# Patient Record
Sex: FEMALE | Race: WHITE | Employment: FULL TIME | URBAN - METROPOLITAN AREA
[De-identification: names, ages, dates, MRNs, and addresses within clinical notes are randomized per-mention and may not be internally consistent; named-entity substitution may affect disease eponyms.]

---

## 2017-03-20 ENCOUNTER — TELEPHONE (OUTPATIENT)
Dept: INTERNAL MEDICINE CLINIC | Age: 57
End: 2017-03-20

## 2017-04-12 ENCOUNTER — HOSPITAL ENCOUNTER (OUTPATIENT)
Dept: LAB | Age: 57
Discharge: HOME | End: 2017-04-12

## 2017-04-12 PROCEDURE — 99001 SPECIMEN HANDLING PT-LAB: CPT | Performed by: INTERNAL MEDICINE

## 2017-04-15 NOTE — PROGRESS NOTES
61 y.o. WHITE OR  female who presents for f/u    She goes to the [facility] several times a week doing yoga/TRX/pilates plus walking 4 mi 3x/week . No cardiovascular complaints    She's been unable to take 4 statins so far, the last one being crestor    She lost some weight with qsymia after we started it back    Vitals 2016   Weight 182 lb 189 lb 179 lb 6.4 oz  177 lb 9.6 oz     The Sjogren's is being tx'ed w evoxac, Dr. Alexandra Hernandez is currently not following her    She has been under a lot of stress the last several months. Her  grandson needed heart surgery, daughter relapsed back into drug abuse, son got laid off, etc.  This started her having some upper quadrant abd pain. She is s/p choley, remains on ppi tx which has resolved the dyspepsia and reflux. The pain is a sharp pain, no associated nausea/vomiting, she's had anorexia because of it. She had colo neg last year.   No gu or gyn complaints, overdue for exam though and she wants to change providers    Also asking for mammo as he is overdue fopr a while now    Past Medical History:   Diagnosis Date    Allergic rhinitis Dr. Daniel Cardoso      Colon adenoma 2016    Dr Kyler Moe , ,     DJD (degenerative joint disease)     B knees Dr. Mark Sánchez FHx: heart disease     GERD (gastroesophageal reflux disease)     Josseline Beat Mem    Hyperlipidemia     calculated risk score 1.9% (); elected to take statins due to Fhx, elev hscrp/ldl-p but intol of 4 diff ones    Hypertension     Obesity (BMI 30.0-34.9)     peak weight 189 lbs, bmi 31.5 from     Overweight (BMI 25.0-29.9)     saw Dr Ildefonso Finley; qsymia ineffective -6/15    Sjogren's disease, probable Dr. Alexandra Hernandez      Past Surgical History:   Procedure Laterality Date    HX APPENDECTOMY     Bandar Jimenez CHOLECYSTECTOMY  1980s    [address] , Dr. Viktor Moe  adenoma    HX TUBAL LIGATION       Social History     Social History    Marital status: SINGLE     Spouse name: N/A    Number of children: 3    Years of education: N/A     Occupational History     [employer]      Social History Main Topics    Smoking status: Current Every Day Smoker     Packs/day: 0.50    Smokeless tobacco: Never Used    Alcohol use Yes    Drug use: No    Sexual activity: Not on file     Other Topics Concern    Not on file     Social History Narrative     Current Outpatient Prescriptions   Medication Sig    hyoscyamine SL (LEVSIN/SL) 0.125 mg SL tablet 1 Tab by SubLINGual route every four (4) hours as needed for Cramping.  phentermine-topiramate (QSYMIA) 15-92 mg CM24 Take 1 Cap by mouth daily. Max Daily Amount: 1 Cap.  fluticasone (FLONASE) 50 mcg/actuation nasal spray 2 sprays each nostril daily    pantoprazole (PROTONIX) 40 mg tablet take 1 tablet by mouth once daily    estrogen, conjugated,-medroxyPROGESTERone (PREMPRO) 0.45-1.5 mg per tablet take 1 tablet by mouth once daily    cevimeline (EVOXAC) 30 mg capsule take 1 capsule by mouth three times a day    multivitamin (ONE A DAY) tablet Take 1 Tab by mouth daily.  CALCIUM CARBONATE/VITAMIN D3 (CALCIUM WITH VITAMIN D PO) Take  by mouth. No current facility-administered medications for this visit.       Allergies   Allergen Reactions    Benazepril Cough    Chantix [Varenicline] Nausea and Vomiting    Codeine Nausea and Vomiting and Other (comments)     Abdominal Pain    Crestor [Rosuvastatin] Myalgia    Lipitor [Atorvastatin] Myalgia    Pravastatin Myalgia    Prevacid [Lansoprazole] Other (comments)     Constipation    Zocor [Simvastatin] Myalgia     REVIEW OF SYSTEMS:  gyn 7/14 Dr Luc Zimmerman, mammo 8/14, colo 8/16 Dr Jagdeep Walter  no vision change or eye pain  Oral  no mouth pain, tongue or tooth problems  Ears  no hearing loss, ear pain, fullness, no swallowing problems  Cardiac  no CP, PND, orthopnea, edema, palpitations or syncope  Chest  no breast masses  Resp  no wheezing, chronic coughing, dyspnea  Neuro  no focal weakness, numbness, paresthesias, incoordination, ataxia, involuntary movements  Endo - no polyuria, polydipsia, nocturia, hot flashes    Visit Vitals    /78    Pulse 98    Temp 98 °F (36.7 °C) (Oral)    Ht 5' 5\" (1.651 m)    Wt 182 lb (82.6 kg)    SpO2 99%    BMI 30.29 kg/m2     Affect is appropriate. Mood stable  No apparent distress  HEENT --Anicteric sclerae. No thyromegaly, JVD, or bruits. Lungs --Clear to auscultation, normal percussion. Heart --Regular rate and rhythm, no murmurs, rubs, gallops, or clicks. Chest wall --Nontender to palpation. PMI normal.  Abdomen -- Soft and nontender, no hepatosplenomegaly or masses. Extremities -- Without cyanosis, clubbing, edema. 2+ pulses equally and bilaterally.     LABS  From 10/11 showed gluc 92,   cr 0.91, gfr 73, alt 16,         ldl-p 1793, chol 221, tg 99,   hdl 65, ldl-c 136, wbc 6.1, hb 14.5, plt 249, jim neg, vit d 37.1, esr 5  From 2/13 showed   gluc 95,   cr 0.82, gfr 82, alt <5                                   chol 220, tg 117, hdl 53, ldl-c 144  From 4/13 showed   gluc 85,   cr 0.70,            alt 15, hba1c 5.0, ldl-p 1372, chol 163, tg 68,    hdl 60, ldl-c 104, wbc 5.7, hb 13.8, plt 228, ua neg, hscrp 3.8, uric 2.8, tsh 0.86, vit d 36, ua neg  From 6/13 showed   gluc 111, cr 0.70, gfr 99, alt 12,     chol 160, tg 73,   hdl 47, ldl-c 98  From 12/13 showed gluc 92,   cr 0.74, gfr 92, alt 13, hba1c 5.6, ldl-p 1055, chol 158, tg 82,   hdl 54, ldl-c 88  From 6/14 showed          chol 162, tg 97,   hdl 55, ldl-c 88,   wbc 6.2, hb 13.5, plt 228, ua neg  From 6/15 showed   gluc 98,   cr 1.01, gfr 57, alt<5, hba1c 5.5,    chol 177, tg 128, hdl 53, ldl-c 98,   wbc 5.8, hb 14.3, plt 238, ua neg  From 8/16 showed   gluc 102, cr 0.90, gfr 72, alt 13,     chol 241, tg 98,   hdl 68, ldl-c 156, wbc 4.8, hb 14.3, plt 255, ua neg, hep c neg  From 12/16 showed       alt 14,     chol 215, tg 116, hdl 62, ldl-c 130      Results for orders placed or performed in visit on 12/12/16   HEPATIC FUNCTION PANEL   Result Value Ref Range    Protein, total 6.4 6.0 - 8.5 g/dL    Albumin 4.0 3.5 - 5.5 g/dL    Bilirubin, total 0.3 0.0 - 1.2 mg/dL    Bilirubin, direct 0.10 0.00 - 0.40 mg/dL    Alk. phosphatase 69 39 - 117 IU/L    AST (SGOT) 16 0 - 40 IU/L    ALT (SGPT) 12 0 - 32 IU/L   LIPID PANEL   Result Value Ref Range    Cholesterol, total 189 100 - 199 mg/dL    Triglyceride 127 0 - 149 mg/dL    HDL Cholesterol 59 >39 mg/dL    VLDL, calculated 25 5 - 40 mg/dL    LDL, calculated 105 (H) 0 - 99 mg/dL   CVD REPORT   Result Value Ref Range    INTERPRETATION Note      Patient Active Problem List   Diagnosis Code    Hyperlipidemia E78.5    Colon polyps Dr. Sindhu Lemon 2011, adenoma 8/16 K63.5    Sjogren's disease, probable Dr. Harini Mitchell M35.00    Essential hypertension I10    Arthritis, degenerative knees Dr Phi Jara M19.90    Gastroesophageal reflux disease without esophagitis K21.9    Allergic rhinitis J30.9    Obesity (BMI 30.0-34. 9) E66.9     ASSESSMENT AND PLAN:  1. Allergic rhinitis. Continue current regimen. 2.  GERD. Continue ppi adn avoidance measures  3. Colon polyps. F/U 2021, fiber  4. Hyperlipidemia w FH CHD. Off meds for now due to intol  5. Hypertension. Continue current  6. Probable Sjogren's. Continue current and f/u Dr. Harini Mitchell prn  7. Overweight. Doing ok on qsymia  8. Abd pain. Check labs, prn levsin, imaging and gi consult if unrevealing or no improvement  9. Mammogram ordered  10. Stress. Declined meds for now as things are better amotionally  11. Gyn referral to Dr Lenny Castro        RTC 10/17    TOTAL time 40 min spent of which at least 30 min was on counseling, answering questions and coordination of care      Above conditions discussed at length and patient vocalized understanding.   All questions answered to patient satifaction    Lipid lowering therapy not prescibed for patient reasons.

## 2017-04-19 ENCOUNTER — OFFICE VISIT (OUTPATIENT)
Dept: INTERNAL MEDICINE CLINIC | Age: 57
End: 2017-04-19

## 2017-04-19 VITALS
BODY MASS INDEX: 30.32 KG/M2 | TEMPERATURE: 98 F | OXYGEN SATURATION: 99 % | HEART RATE: 98 BPM | HEIGHT: 65 IN | DIASTOLIC BLOOD PRESSURE: 78 MMHG | WEIGHT: 182 LBS | SYSTOLIC BLOOD PRESSURE: 126 MMHG

## 2017-04-19 DIAGNOSIS — R10.10 PAIN OF UPPER ABDOMEN: ICD-10-CM

## 2017-04-19 DIAGNOSIS — E66.9 OBESITY (BMI 30.0-34.9): ICD-10-CM

## 2017-04-19 DIAGNOSIS — R53.83 FATIGUE, UNSPECIFIED TYPE: ICD-10-CM

## 2017-04-19 DIAGNOSIS — R53.83 FATIGUE, UNSPECIFIED TYPE: Primary | ICD-10-CM

## 2017-04-19 DIAGNOSIS — E78.5 HYPERLIPIDEMIA, UNSPECIFIED HYPERLIPIDEMIA TYPE: ICD-10-CM

## 2017-04-19 DIAGNOSIS — Z12.31 SCREENING MAMMOGRAM, ENCOUNTER FOR: ICD-10-CM

## 2017-04-19 DIAGNOSIS — I10 ESSENTIAL HYPERTENSION: ICD-10-CM

## 2017-04-19 DIAGNOSIS — K21.9 GASTROESOPHAGEAL REFLUX DISEASE WITHOUT ESOPHAGITIS: ICD-10-CM

## 2017-04-19 DIAGNOSIS — Z01.419 ROUTINE GYNECOLOGICAL EXAMINATION: ICD-10-CM

## 2017-04-19 RX ORDER — HYOSCYAMINE SULFATE 0.12 MG/1
0.12 TABLET SUBLINGUAL
Qty: 40 TAB | Refills: 2 | Status: SHIPPED | OUTPATIENT
Start: 2017-04-19 | End: 2018-02-22

## 2017-04-19 NOTE — PROGRESS NOTES
1. Have you been to the ER, urgent care clinic or hospitalized since your last visit? NO.     2. Have you seen or consulted any other health care providers outside of the [address] [place] [address] since your last visit (Include any pap smears or colon screening)? NO      Do you have an Advanced Directive? NO    Would you like information on Advanced Directives?  YES

## 2017-04-19 NOTE — MR AVS SNAPSHOT
Visit Information Date & Time Provider Department Dept. Phone Encounter #  
 4/19/2017  8:00 AM Charisma Way MD Internist of [address] [place] [address] [phone] [id number] Your Appointments 10/11/2017  9:55 AM  
LAB with [facility] NURSE VISIT Internist of [facility] ([facility]) Appt Note: labs [address]  
 [address], [address] [facility] [address]  
  
   
 [address], [address]  
  
    
 10/17/2017 10:00 AM  
Office Visit with Charisma Way MD  
Internist of [address] [place] [address]) Appt Note: ov [address]  
 [address], [address] [facility] [address]  
  
   
 [address], [address] Upcoming Health Maintenance Date Due DTaP/Tdap/Td series (1 - Tdap) 2/2/2007 BREAST CANCER SCRN MAMMOGRAM 8/12/2016 PAP AKA CERVICAL CYTOLOGY 7/9/2017 COLONOSCOPY 8/29/2021 Allergies as of 4/19/2017  Review Complete On: 12/12/2016 By: Charisma Way MD  
  
 Severity Noted Reaction Type Reactions Benazepril  10/08/2011    Cough Chantix [Varenicline]    Nausea and Vomiting Codeine    Nausea and Vomiting, Other (comments) Abdominal Pain Lipitor [Atorvastatin]  08/04/2016    Myalgia Pravastatin  12/12/2016    Myalgia Prevacid [Lansoprazole]    Other (comments) Constipation Current Immunizations  Reviewed on 6/27/2014 Name Date Influenza Vaccine (Quad) PF 12/12/2016 Influenza Vaccine PF 12/20/2013 TD Vaccine 2/1/2007 Not reviewed this visit Vitals BP Pulse Temp Height(growth percentile) Weight(growth percentile) SpO2  
 126/78 98 98 °F (36.7 °C) (Oral) 5' 5\" (1.651 m) 182 lb (82.6 kg) 99% BMI OB Status Smoking Status 30.29 kg/m2 Postmenopausal Current Every Day Smoker Vitals History BMI and BSA Data  Body Mass Index Body Surface Area  
 30.29 kg/m 2 1.95 m 2  
  
  
 Preferred Pharmacy Pharmacy Name Phone [facility], [address] [place] [address] Rd [phone] Your Updated Medication List  
  
   
This list is accurate as of: 4/19/17  8:48 AM.  Always use your most recent med list.  
  
  
  
  
 CALCIUM WITH VITAMIN D PO Take  by mouth.  
  
 cevimeline 30 mg capsule Commonly known as:  EVOXAC  
take 1 capsule by mouth three times a day  
  
 estrogen (conjugated)-medroxyPROGESTERone 0.45-1.5 mg per tablet Commonly known as:  PREMPRO  
take 1 tablet by mouth once daily  
  
 fluticasone 50 mcg/actuation nasal spray Commonly known as:  FLONASE  
2 sprays each nostril daily  
  
 multivitamin tablet Commonly known as:  ONE A DAY Take 1 Tab by mouth daily. pantoprazole 40 mg tablet Commonly known as:  PROTONIX  
take 1 tablet by mouth once daily  
  
 phentermine-topiramate 15-92 mg Cm24 Commonly known as:  QSYMIA Take 1 Cap by mouth daily. Max Daily Amount: 1 Cap. Introducing \[facility]\"" & HEALTH SERVICES! Dear Kierra Tay: Thank you for requesting a [employer] account. Our records indicate that you already have an active [employer] account. You can access your account anytime at [email or web address] Did you know that you can access your hospital and ER discharge instructions at any time in [employer]? You can also review all of your test results from your hospital stay or ER visit. Additional Information If you have questions, please visit the Frequently Asked Questions section of the [employer] website at [email or web address] Remember, [employer] is NOT to be used for urgent needs. For medical emergencies, dial 911. Now available from your iPhone and Android! Please provide this summary of care documentation to your next provider. Your primary care clinician is listed as Angel Alan.  If you have any questions after today's visit, please call [phone].

## 2017-04-20 ENCOUNTER — DOCUMENTATION ONLY (OUTPATIENT)
Dept: INTERNAL MEDICINE CLINIC | Age: 57
End: 2017-04-20

## 2017-04-20 LAB
BASOPHILS # BLD AUTO: 0 X10E3/UL (ref 0–0.2)
BASOPHILS NFR BLD AUTO: 1 %
BUN SERPL-MCNC: 16 MG/DL (ref 6–24)
BUN/CREAT SERPL: 17 (ref 9–23)
CALCIUM SERPL-MCNC: 9.7 MG/DL (ref 8.7–10.2)
CHLORIDE SERPL-SCNC: 104 MMOL/L (ref 96–106)
CO2 SERPL-SCNC: 24 MMOL/L (ref 18–29)
CREAT SERPL-MCNC: 0.93 MG/DL (ref 0.57–1)
EOSINOPHIL # BLD AUTO: 0.2 X10E3/UL (ref 0–0.4)
EOSINOPHIL NFR BLD AUTO: 3 %
ERYTHROCYTE [DISTWIDTH] IN BLOOD BY AUTOMATED COUNT: 13 % (ref 12.3–15.4)
GLUCOSE SERPL-MCNC: 102 MG/DL (ref 65–99)
HCT VFR BLD AUTO: 44.1 % (ref 34–46.6)
HGB BLD-MCNC: 14.5 G/DL (ref 11.1–15.9)
IMM GRANULOCYTES # BLD: 0 X10E3/UL (ref 0–0.1)
IMM GRANULOCYTES NFR BLD: 0 %
LIPASE SERPL-CCNC: 34 U/L (ref 0–59)
LYMPHOCYTES # BLD AUTO: 1.3 X10E3/UL (ref 0.7–3.1)
LYMPHOCYTES NFR BLD AUTO: 23 %
MCH RBC QN AUTO: 31.5 PG (ref 26.6–33)
MCHC RBC AUTO-ENTMCNC: 32.9 G/DL (ref 31.5–35.7)
MCV RBC AUTO: 96 FL (ref 79–97)
MONOCYTES # BLD AUTO: 0.4 X10E3/UL (ref 0.1–0.9)
MONOCYTES NFR BLD AUTO: 7 %
NEUTROPHILS # BLD AUTO: 3.7 X10E3/UL (ref 1.4–7)
NEUTROPHILS NFR BLD AUTO: 66 %
PLATELET # BLD AUTO: 291 X10E3/UL (ref 150–379)
POTASSIUM SERPL-SCNC: 4.2 MMOL/L (ref 3.5–5.2)
RBC # BLD AUTO: 4.61 X10E6/UL (ref 3.77–5.28)
SODIUM SERPL-SCNC: 142 MMOL/L (ref 134–144)
T4 FREE SERPL-MCNC: 1.29 NG/DL (ref 0.82–1.77)
TSH SERPL DL<=0.005 MIU/L-ACNC: 1.84 UIU/ML (ref 0.45–4.5)
WBC # BLD AUTO: 5.6 X10E3/UL (ref 3.4–10.8)

## 2017-04-20 NOTE — PROGRESS NOTES
Referral completed for Dr. Joy Archuleta [phone] 4/19/2017-4/18/2018 12 visits    Copy faxed to doctor with records and mailed to pt

## 2017-04-24 ENCOUNTER — HOSPITAL ENCOUNTER (OUTPATIENT)
Dept: MAMMOGRAPHY | Age: 57
Discharge: HOME | End: 2017-04-24
Attending: INTERNAL MEDICINE
Payer: COMMERCIAL

## 2017-04-24 DIAGNOSIS — Z12.31 SCREENING MAMMOGRAM, ENCOUNTER FOR: ICD-10-CM

## 2017-04-24 PROCEDURE — 77067 SCR MAMMO BI INCL CAD: CPT

## 2017-05-25 ENCOUNTER — TELEPHONE (OUTPATIENT)
Dept: INTERNAL MEDICINE CLINIC | Age: 57
End: 2017-05-25

## 2017-05-25 DIAGNOSIS — R10.11 RIGHT UPPER QUADRANT ABDOMINAL PAIN: Primary | ICD-10-CM

## 2017-06-05 ENCOUNTER — TELEPHONE (OUTPATIENT)
Dept: INTERNAL MEDICINE CLINIC | Age: 57
End: 2017-06-05

## 2017-06-05 DIAGNOSIS — R10.9 ABDOMINAL PAIN, UNSPECIFIED LOCATION: Primary | ICD-10-CM

## 2017-06-06 NOTE — TELEPHONE ENCOUNTER
auth # [id number] unlimited visits  06/06/2017 to 10/03/2017  Mailed to pt faxed to [facility]

## 2017-06-11 DIAGNOSIS — E78.5 HYPERLIPIDEMIA, UNSPECIFIED HYPERLIPIDEMIA TYPE: ICD-10-CM

## 2017-06-14 DIAGNOSIS — E78.5 HYPERLIPIDEMIA, UNSPECIFIED HYPERLIPIDEMIA TYPE: ICD-10-CM

## 2017-08-10 ENCOUNTER — TELEPHONE (OUTPATIENT)
Dept: INTERNAL MEDICINE CLINIC | Age: 57
End: 2017-08-10

## 2017-08-10 DIAGNOSIS — M25.562 PAIN IN BOTH KNEES, UNSPECIFIED CHRONICITY: Primary | ICD-10-CM

## 2017-08-10 DIAGNOSIS — M25.561 PAIN IN BOTH KNEES, UNSPECIFIED CHRONICITY: Primary | ICD-10-CM

## 2017-08-10 NOTE — TELEPHONE ENCOUNTER
----- Message from Shelley Lin sent at 8/10/2017 10:22 AM EDT -----  Regarding: Referral Request  Contact: [phone]  Request a referral to Dr. Iker Rodriguez. Every year I get the Euflexxa shots in my knees and it has been a year. My knees are starting to hurt. Thank you!   BTW - I love this office :)

## 2017-08-17 RX ORDER — PANTOPRAZOLE SODIUM 40 MG/1
TABLET, DELAYED RELEASE ORAL
Qty: 90 TAB | Refills: 3 | Status: SHIPPED | OUTPATIENT
Start: 2017-08-17 | End: 2018-02-22

## 2017-08-17 RX ORDER — CEVIMELINE HYDROCHLORIDE 30 MG/1
CAPSULE ORAL
Qty: 270 CAP | Refills: 3 | Status: SHIPPED | OUTPATIENT
Start: 2017-08-17 | End: 2018-02-22

## 2017-08-21 ENCOUNTER — TELEPHONE (OUTPATIENT)
Dept: ORTHOPEDIC SURGERY | Age: 57
End: 2017-08-21

## 2017-08-21 DIAGNOSIS — M17.0 PRIMARY OSTEOARTHRITIS OF BOTH KNEES: Primary | ICD-10-CM

## 2017-08-21 NOTE — TELEPHONE ENCOUNTER
Patient called to request annual euflexxa series for both knees. Patients appointments have been scheduled starting 9/5 and she is aware an order request and auth have to be documented. Please process for patient.

## 2017-09-12 ENCOUNTER — OFFICE VISIT (OUTPATIENT)
Dept: ORTHOPEDIC SURGERY | Age: 57
End: 2017-09-12

## 2017-09-12 VITALS
DIASTOLIC BLOOD PRESSURE: 94 MMHG | BODY MASS INDEX: 28.32 KG/M2 | HEIGHT: 65 IN | WEIGHT: 170 LBS | TEMPERATURE: 99.2 F | HEART RATE: 64 BPM | SYSTOLIC BLOOD PRESSURE: 163 MMHG | OXYGEN SATURATION: 94 %

## 2017-09-12 DIAGNOSIS — M17.0 BILATERAL PRIMARY OSTEOARTHRITIS OF KNEE: Primary | ICD-10-CM

## 2017-09-12 RX ORDER — HYALURONATE SODIUM 10 MG/ML
2 SYRINGE (ML) INTRAARTICULAR ONCE
Qty: 2 ML | Refills: 0
Start: 2017-09-12 | End: 2017-09-12

## 2017-09-12 NOTE — PATIENT INSTRUCTIONS
Arthritis: Care Instructions  Your Care Instructions  Arthritis, also called osteoarthritis, is a breakdown of the cartilage that cushions your joints. When the cartilage wears down, your bones rub against each other. This causes pain and stiffness. Many people have some arthritis as they age. Arthritis most often affects the joints of the spine, hands, hips, knees, or feet. You can take simple measures to protect your joints, ease your pain, and help you stay active. Follow-up care is a key part of your treatment and safety. Be sure to make and go to all appointments, and call your doctor if you are having problems. It's also a good idea to know your test results and keep a list of the medicines you take. How can you care for yourself at home? · Stay at a healthy weight. Being overweight puts extra strain on your joints. · Talk to your doctor or physical therapist about exercises that will help ease joint pain. ¨ Stretch. You may enjoy gentle forms of yoga to help keep your joints and muscles flexible. ¨ Walk instead of jog. Other types of exercise that are less stressful on the joints include riding a bicycle, swimming, jayme chi, or water exercise. ¨ Lift weights. Strong muscles help reduce stress on your joints. Stronger thigh muscles, for example, take some of the stress off of the knees and hips. Learn the right way to lift weights so you do not make joint pain worse. · Take your medicines exactly as prescribed. Call your doctor if you think you are having a problem with your medicine. · Take pain medicines exactly as directed. ¨ If the doctor gave you a prescription medicine for pain, take it as prescribed. ¨ If you are not taking a prescription pain medicine, ask your doctor if you can take an over-the-counter medicine. · Use a cane, crutch, walker, or another device if you need help to get around. These can help rest your joints.  You also can use other things to make life easier, such as a higher toilet seat and padded handles on kitchen utensils. · Do not sit in low chairs, which can make it hard to get up. · Put heat or cold on your sore joints as needed. Use whichever helps you most. You also can take turns with hot and cold packs. ¨ Apply heat 2 or 3 times a day for 20 to 30 minutesusing a heating pad, hot shower, or hot packto relieve pain and stiffness. ¨ Put ice or a cold pack on your sore joint for 10 to 20 minutes at a time. Put a thin cloth between the ice and your skin. When should you call for help? Call your doctor now or seek immediate medical care if:  · You have sudden swelling, warmth, or pain in any joint. · You have joint pain and a fever or rash. · You have such bad pain that you cannot use a joint. Watch closely for changes in your health, and be sure to contact your doctor if:  · You have mild joint symptoms that continue even with more than 6 weeks of care at home. · You have stomach pain or other problems with your medicine. Where can you learn more? Go to http://lance-carlos.info/. Enter V585 in the search box to learn more about \"Arthritis: Care Instructions. \"  Current as of: November 28, 2016  Content Version: 11.3  © 8797-0785 [employer]. Care instructions adapted under license by [employer] (which disclaims liability or warranty for this information). If you have questions about a medical condition or this instruction, always ask your healthcare professional. [place] [address] any warranty or liability for your use of this information.

## 2017-09-12 NOTE — PROGRESS NOTES
Patient: Sammie Espitia                MRN: [id number]       SSN: [id number]  YOB: 1960        AGE: 61 y.o. SEX: female  Body mass index is 28.29 kg/(m^2). PCP: Whitney Cuellar MD  09/12/17    Chief Complaint   Patient presents with    Knee Pain     BILAT       HISTORY:  Sammie Espitia is a 61 y.o. female who is seen for her first Euflexxa injection of the B/L knees. PROCEDURE:  Patient's B/L knees, after timeout under sterile conditions, was injected with 2 cc of Euflexxa. [facility]  OFFICE PROCEDURE PROGRESS NOTE        Chart reviewed for the following:   Melissa Mark MD, have reviewed the History, Physical and updated the Allergic reactions for [address] [place] [address]. performed immediately prior to start of procedure:   Melissa Mark MD, have performed the following reviews on Sammie Espitia prior to the start of the procedure:            * Patient was identified by name and date of birth   * Agreement on procedure being performed was verified  * Risks and Benefits explained to the patient  * Procedure site verified and marked as necessary  * Patient was positioned for comfort  * Consent was signed and verified     Time: 4:03 PM       Date of procedure: 9/12/2017    Procedure performed by:  Joy Clarke M.D. Provider assisted by: None     How tolerated by patient: tolerated the procedure well with no complications    Comments: none    IMPRESSION:     ICD-10-CM ICD-9-CM    1. Bilateral primary osteoarthritis of knee M17.0 715.16 [place] DRAIN/INJECT LARGE JOINT/BURSA      EUFLEXXA INJECTION PER DOSE      sodium hyaluronate (SUPARTZ FX/HYALGAN/GENIVSC) 10 mg/mL syrg injection        PLAN:  Ms. Rebeca Santillan will return in one week for her second Euflexxa injection.       Scribed by Oliver Merino ([address]) as dictated by Joy Clarke MD    I, Dr. Joy Clarke, confirm that all documentation is accurate.     Maggy Neville M.D.   [facility]

## 2017-09-19 ENCOUNTER — OFFICE VISIT (OUTPATIENT)
Dept: ORTHOPEDIC SURGERY | Age: 57
End: 2017-09-19

## 2017-09-19 VITALS
RESPIRATION RATE: 15 BRPM | TEMPERATURE: 98.9 F | HEART RATE: 63 BPM | SYSTOLIC BLOOD PRESSURE: 146 MMHG | OXYGEN SATURATION: 96 % | HEIGHT: 65 IN | DIASTOLIC BLOOD PRESSURE: 85 MMHG

## 2017-09-19 DIAGNOSIS — M17.0 BILATERAL PRIMARY OSTEOARTHRITIS OF KNEE: Primary | ICD-10-CM

## 2017-09-19 RX ORDER — HYALURONATE SODIUM 10 MG/ML
2 SYRINGE (ML) INTRAARTICULAR ONCE
Qty: 2 ML | Refills: 0
Start: 2017-09-19 | End: 2017-09-19

## 2017-09-19 NOTE — PROGRESS NOTES
Patient: Silvia Klein                MRN: [id number]       SSN: [id number]  YOB: 1960        AGE: 61 y.o. SEX: female  There is no height or weight on file to calculate BMI. PCP: Albaro Castro MD  09/19/17    No chief complaint on file. HISTORY:  Silvia Klein is a 61 y.o. female who is seen for reevaluation of Bilateral knee here for 2nd injection of euflexxa. PROCEDURE:  Patient's Bilateral knee, after timeout under sterile conditions, was injected with 2 cc of Euflexxa. [facility]  OFFICE PROCEDURE PROGRESS NOTE        Chart reviewed for the following:   Keith SHULTZ PA-C, have reviewed the History, Physical and updated the Allergic reactions for [address] [place] [address]. performed immediately prior to start of procedure:   Keith SHULTZ PA-C, have performed the following reviews on Silvia Klein prior to the start of the procedure:            * Patient was identified by name and date of birth   * Agreement on procedure being performed was verified  * Risks and Benefits explained to the patient  * Procedure site verified and marked as necessary  * Patient was positioned for comfort  * Consent was signed and verified     Time: 3:57 PM    Date of procedure: 9/19/2017    Procedure performed by:  ALONSO Lobo    Provider assisted by: None     How tolerated by patient: tolerated the procedure well with no complications    Comments: none    IMPRESSION:     ICD-10-CM ICD-9-CM    1. Bilateral primary osteoarthritis of knee M17.0 715.16         PLAN:  Ms. Edith Siegel will return in one week for her third Euflexxa injection.       Keith Saravia PA-C   [facility]

## 2017-09-26 ENCOUNTER — OFFICE VISIT (OUTPATIENT)
Dept: ORTHOPEDIC SURGERY | Age: 57
End: 2017-09-26

## 2017-09-26 VITALS
RESPIRATION RATE: 15 BRPM | HEIGHT: 65 IN | HEART RATE: 84 BPM | SYSTOLIC BLOOD PRESSURE: 131 MMHG | OXYGEN SATURATION: 94 % | DIASTOLIC BLOOD PRESSURE: 94 MMHG | TEMPERATURE: 98.6 F

## 2017-09-26 DIAGNOSIS — M17.0 PRIMARY OSTEOARTHRITIS OF BOTH KNEES: Primary | ICD-10-CM

## 2017-09-26 RX ORDER — HYALURONATE SODIUM 10 MG/ML
2 SYRINGE (ML) INTRAARTICULAR ONCE
Qty: 2 ML | Refills: 0
Start: 2017-09-26 | End: 2017-09-26

## 2017-09-26 NOTE — PROGRESS NOTES
Patient: Leon Valencia                MRN: [id number]       SSN: [id number]  YOB: 1960        AGE: 61 y.o. SEX: female  There is no height or weight on file to calculate BMI. PCP: Zenaida Barcenas MD  09/26/17    Chief Complaint   Patient presents with    Knee Pain     Bilateral       HISTORY:  Leon Valencia is a 61 y.o. female who is seen for her second Euflexxa injections of the B/L knees. PROCEDURE:  Patient's B/L knees, after timeout under sterile conditions, was injected with 2 cc of Euflexxa. [facility]  OFFICE PROCEDURE PROGRESS NOTE        Chart reviewed for the following:   Maribell Thomas MD, have reviewed the History, Physical and updated the Allergic reactions for [address] [place] [address]. performed immediately prior to start of procedure:   Maribell Thomas MD, have performed the following reviews on Leon Valencia prior to the start of the procedure:            * Patient was identified by name and date of birth   * Agreement on procedure being performed was verified  * Risks and Benefits explained to the patient  * Procedure site verified and marked as necessary  * Patient was positioned for comfort  * Consent was signed and verified     Time: 4:12 PM       Date of procedure: 9/26/2017    Procedure performed by:  Shilpi Marino MD    Provider assisted by: None     How tolerated by patient: tolerated the procedure well with no complications    Comments: none    IMPRESSION:     ICD-10-CM ICD-9-CM    1. Primary osteoarthritis of both knees M17.0 715.16 [place] DRAIN/INJECT LARGE JOINT/BURSA      EUFLEXXA INJECTION PER DOSE      sodium hyaluronate (SUPARTZ FX/HYALGAN/GENIVSC) 10 mg/mL syrg injection        PLAN: Ms. Rebeca Mcdermott has completed her Euflexxa injection series. she will return as needed.       Scribed by Sissy Reis ([address]) as dictated by Shilpi Marino MD    I, Dr. Kira Soriano, confirm that all documentation is accurate.     Kira Soriano M.D.   [facility]

## 2017-09-26 NOTE — PATIENT INSTRUCTIONS
Arthritis: Care Instructions  Your Care Instructions  Arthritis, also called osteoarthritis, is a breakdown of the cartilage that cushions your joints. When the cartilage wears down, your bones rub against each other. This causes pain and stiffness. Many people have some arthritis as they age. Arthritis most often affects the joints of the spine, hands, hips, knees, or feet. You can take simple measures to protect your joints, ease your pain, and help you stay active. Follow-up care is a key part of your treatment and safety. Be sure to make and go to all appointments, and call your doctor if you are having problems. It's also a good idea to know your test results and keep a list of the medicines you take. How can you care for yourself at home? · Stay at a healthy weight. Being overweight puts extra strain on your joints. · Talk to your doctor or physical therapist about exercises that will help ease joint pain. ¨ Stretch. You may enjoy gentle forms of yoga to help keep your joints and muscles flexible. ¨ Walk instead of jog. Other types of exercise that are less stressful on the joints include riding a bicycle, swimming, jayme chi, or water exercise. ¨ Lift weights. Strong muscles help reduce stress on your joints. Stronger thigh muscles, for example, take some of the stress off of the knees and hips. Learn the right way to lift weights so you do not make joint pain worse. · Take your medicines exactly as prescribed. Call your doctor if you think you are having a problem with your medicine. · Take pain medicines exactly as directed. ¨ If the doctor gave you a prescription medicine for pain, take it as prescribed. ¨ If you are not taking a prescription pain medicine, ask your doctor if you can take an over-the-counter medicine. · Use a cane, crutch, walker, or another device if you need help to get around. These can help rest your joints.  You also can use other things to make life easier, such as a higher toilet seat and padded handles on kitchen utensils. · Do not sit in low chairs, which can make it hard to get up. · Put heat or cold on your sore joints as needed. Use whichever helps you most. You also can take turns with hot and cold packs. ¨ Apply heat 2 or 3 times a day for 20 to 30 minutes--using a heating pad, hot shower, or hot pack--to relieve pain and stiffness. ¨ Put ice or a cold pack on your sore joint for 10 to 20 minutes at a time. Put a thin cloth between the ice and your skin. When should you call for help? Call your doctor now or seek immediate medical care if:  · You have sudden swelling, warmth, or pain in any joint. · You have joint pain and a fever or rash. · You have such bad pain that you cannot use a joint. Watch closely for changes in your health, and be sure to contact your doctor if:  · You have mild joint symptoms that continue even with more than 6 weeks of care at home. · You have stomach pain or other problems with your medicine. Where can you learn more? Go to http://lance-carlos.info/. Enter K028 in the search box to learn more about \"Arthritis: Care Instructions. \"  Current as of: November 28, 2016  Content Version: 11.3  © 0527-4849 [employer]. Care instructions adapted under license by [employer] (which disclaims liability or warranty for this information). If you have questions about a medical condition or this instruction, always ask your healthcare professional. [place] [address] any warranty or liability for your use of this information.

## 2017-10-17 DIAGNOSIS — E78.5 HYPERLIPIDEMIA, UNSPECIFIED HYPERLIPIDEMIA TYPE: ICD-10-CM

## 2017-11-15 ENCOUNTER — DOCUMENTATION ONLY (OUTPATIENT)
Dept: INTERNAL MEDICINE CLINIC | Age: 57
End: 2017-11-15

## 2017-11-15 NOTE — PROGRESS NOTES
OPMICKO:[id number];ADWTYMT Name:ST: Proton Pump Inhibitors (PPI) - [place] [employer];Status:Approved; Coverage Start Date:11/15/2017;Coverage End Date:11/15/2018;     Faxed a copy to pharmacy

## 2017-11-17 ENCOUNTER — DOCUMENTATION ONLY (OUTPATIENT)
Dept: INTERNAL MEDICINE CLINIC | Age: 57
End: 2017-11-17

## 2018-02-13 ENCOUNTER — HOSPITAL ENCOUNTER (OUTPATIENT)
Dept: PREADMISSION TESTING | Age: 58
Discharge: HOME | End: 2018-02-13
Payer: COMMERCIAL

## 2018-02-13 DIAGNOSIS — M17.11 OSTEOARTHRITIS OF RIGHT KNEE: ICD-10-CM

## 2018-02-13 LAB
ALBUMIN SERPL-MCNC: 3.4 G/DL (ref 3.4–5)
ALBUMIN/GLOB SERPL: 1 {RATIO} (ref 0.8–1.7)
ALP SERPL-CCNC: 101 U/L (ref 45–117)
ALT SERPL-CCNC: 34 U/L (ref 13–56)
ANION GAP SERPL CALC-SCNC: 7 MMOL/L (ref 3–18)
APPEARANCE UR: CLEAR
APTT PPP: 25.4 SEC (ref 23–36.4)
AST SERPL-CCNC: 17 U/L (ref 15–37)
ATRIAL RATE: 67 BPM
BASOPHILS # BLD: 0.1 K/UL (ref 0–0.06)
BASOPHILS NFR BLD: 1 % (ref 0–2)
BILIRUB SERPL-MCNC: 0.2 MG/DL (ref 0.2–1)
BILIRUB UR QL: NEGATIVE
BUN SERPL-MCNC: 18 MG/DL (ref 7–18)
BUN/CREAT SERPL: 21 (ref 12–20)
CALCIUM SERPL-MCNC: 8.9 MG/DL (ref 8.5–10.1)
CALCULATED P AXIS, ECG09: 30 DEGREES
CALCULATED R AXIS, ECG10: 77 DEGREES
CALCULATED T AXIS, ECG11: 66 DEGREES
CHLORIDE SERPL-SCNC: 105 MMOL/L (ref 100–108)
CO2 SERPL-SCNC: 31 MMOL/L (ref 21–32)
COLOR UR: YELLOW
CREAT SERPL-MCNC: 0.86 MG/DL (ref 0.6–1.3)
DIAGNOSIS, 93000: NORMAL
DIFFERENTIAL METHOD BLD: ABNORMAL
EOSINOPHIL # BLD: 0.2 K/UL (ref 0–0.4)
EOSINOPHIL NFR BLD: 3 % (ref 0–5)
ERYTHROCYTE [DISTWIDTH] IN BLOOD BY AUTOMATED COUNT: 13.1 % (ref 11.6–14.5)
ERYTHROCYTE [SEDIMENTATION RATE] IN BLOOD: 24 MM/HR (ref 0–30)
GLOBULIN SER CALC-MCNC: 3.4 G/DL (ref 2–4)
GLUCOSE SERPL-MCNC: 92 MG/DL (ref 74–99)
GLUCOSE UR STRIP.AUTO-MCNC: NEGATIVE MG/DL
HBA1C MFR BLD: 5.3 % (ref 4.5–5.6)
HCT VFR BLD AUTO: 39.1 % (ref 35–45)
HGB BLD-MCNC: 12.7 G/DL (ref 12–16)
HGB UR QL STRIP: NEGATIVE
INR PPP: 0.9 (ref 0.8–1.2)
KETONES UR QL STRIP.AUTO: NEGATIVE MG/DL
LEUKOCYTE ESTERASE UR QL STRIP.AUTO: NEGATIVE
LYMPHOCYTES # BLD: 1.6 K/UL (ref 0.9–3.6)
LYMPHOCYTES NFR BLD: 32 % (ref 21–52)
MCH RBC QN AUTO: 30.6 PG (ref 24–34)
MCHC RBC AUTO-ENTMCNC: 32.5 G/DL (ref 31–37)
MCV RBC AUTO: 94.2 FL (ref 74–97)
MONOCYTES # BLD: 0.4 K/UL (ref 0.05–1.2)
MONOCYTES NFR BLD: 8 % (ref 3–10)
NEUTS SEG # BLD: 2.9 K/UL (ref 1.8–8)
NEUTS SEG NFR BLD: 56 % (ref 40–73)
NITRITE UR QL STRIP.AUTO: NEGATIVE
P-R INTERVAL, ECG05: 130 MS
PH UR STRIP: 5 [PH] (ref 5–8)
PLATELET # BLD AUTO: 261 K/UL (ref 135–420)
PMV BLD AUTO: 10.4 FL (ref 9.2–11.8)
POTASSIUM SERPL-SCNC: 4.2 MMOL/L (ref 3.5–5.5)
PROT SERPL-MCNC: 6.8 G/DL (ref 6.4–8.2)
PROT UR STRIP-MCNC: NEGATIVE MG/DL
PROTHROMBIN TIME: 11.8 SEC (ref 11.5–15.2)
Q-T INTERVAL, ECG07: 378 MS
QRS DURATION, ECG06: 82 MS
QTC CALCULATION (BEZET), ECG08: 399 MS
RBC # BLD AUTO: 4.15 M/UL (ref 4.2–5.3)
SODIUM SERPL-SCNC: 143 MMOL/L (ref 136–145)
SP GR UR REFRACTOMETRY: 1.01 (ref 1–1.03)
UROBILINOGEN UR QL STRIP.AUTO: 0.2 EU/DL (ref 0.2–1)
VENTRICULAR RATE, ECG03: 67 BPM
WBC # BLD AUTO: 5.2 K/UL (ref 4.6–13.2)

## 2018-02-13 PROCEDURE — 87086 URINE CULTURE/COLONY COUNT: CPT | Performed by: ORTHOPAEDIC SURGERY

## 2018-02-13 PROCEDURE — 85610 PROTHROMBIN TIME: CPT | Performed by: ORTHOPAEDIC SURGERY

## 2018-02-13 PROCEDURE — 80053 COMPREHEN METABOLIC PANEL: CPT | Performed by: ORTHOPAEDIC SURGERY

## 2018-02-13 PROCEDURE — 36415 COLL VENOUS BLD VENIPUNCTURE: CPT | Performed by: ORTHOPAEDIC SURGERY

## 2018-02-13 PROCEDURE — 93005 ELECTROCARDIOGRAM TRACING: CPT

## 2018-02-13 PROCEDURE — 81003 URINALYSIS AUTO W/O SCOPE: CPT | Performed by: ORTHOPAEDIC SURGERY

## 2018-02-13 PROCEDURE — 87641 MR-STAPH DNA AMP PROBE: CPT | Performed by: ORTHOPAEDIC SURGERY

## 2018-02-13 PROCEDURE — 85652 RBC SED RATE AUTOMATED: CPT | Performed by: ORTHOPAEDIC SURGERY

## 2018-02-13 PROCEDURE — 85730 THROMBOPLASTIN TIME PARTIAL: CPT | Performed by: ORTHOPAEDIC SURGERY

## 2018-02-13 PROCEDURE — 85025 COMPLETE CBC W/AUTO DIFF WBC: CPT | Performed by: ORTHOPAEDIC SURGERY

## 2018-02-13 PROCEDURE — 83036 HEMOGLOBIN GLYCOSYLATED A1C: CPT | Performed by: ORTHOPAEDIC SURGERY

## 2018-02-14 LAB
BACTERIA SPEC CULT: NORMAL
SERVICE CMNT-IMP: NORMAL

## 2018-02-15 LAB
BACTERIA SPEC CULT: NORMAL
SERVICE CMNT-IMP: NORMAL

## 2018-03-11 ENCOUNTER — ANESTHESIA EVENT (OUTPATIENT)
Dept: SURGERY | Age: 58
DRG: 470 | End: 2018-03-11
Payer: COMMERCIAL

## 2018-03-11 RX ORDER — PREGABALIN 25 MG/1
25 CAPSULE ORAL ONCE
Status: CANCELLED | OUTPATIENT
Start: 2018-03-11 | End: 2018-03-11

## 2018-03-11 RX ORDER — CELECOXIB 100 MG/1
200 CAPSULE ORAL ONCE
Status: CANCELLED | OUTPATIENT
Start: 2018-03-11 | End: 2018-03-11

## 2018-03-11 RX ORDER — ACETAMINOPHEN 10 MG/ML
1000 INJECTION, SOLUTION INTRAVENOUS ONCE
Status: CANCELLED | OUTPATIENT
Start: 2018-03-11 | End: 2018-03-11

## 2018-03-12 ENCOUNTER — ANESTHESIA (OUTPATIENT)
Dept: SURGERY | Age: 58
DRG: 470 | End: 2018-03-12
Payer: COMMERCIAL

## 2018-03-12 ENCOUNTER — HOSPITAL ENCOUNTER (INPATIENT)
Age: 58
LOS: 1 days | Discharge: HOME | DRG: 470 | End: 2018-03-13
Attending: ORTHOPAEDIC SURGERY | Admitting: ORTHOPAEDIC SURGERY
Payer: COMMERCIAL

## 2018-03-12 ENCOUNTER — APPOINTMENT (OUTPATIENT)
Dept: GENERAL RADIOLOGY | Age: 58
DRG: 470 | End: 2018-03-12
Attending: PHYSICIAN ASSISTANT
Payer: COMMERCIAL

## 2018-03-12 DIAGNOSIS — M17.11 PRIMARY OSTEOARTHRITIS OF RIGHT KNEE: Primary | ICD-10-CM

## 2018-03-12 LAB
ABO + RH BLD: NORMAL
BLOOD GROUP ANTIBODIES SERPL: NORMAL
SPECIMEN EXP DATE BLD: NORMAL

## 2018-03-12 PROCEDURE — 76060000035 HC ANESTHESIA 2 TO 2.5 HR: Performed by: ORTHOPAEDIC SURGERY

## 2018-03-12 PROCEDURE — 77030002933 HC SUT MCRYL J&J -A: Performed by: ORTHOPAEDIC SURGERY

## 2018-03-12 PROCEDURE — 77030011264 HC ELECTRD BLD EXT COVD -A: Performed by: ORTHOPAEDIC SURGERY

## 2018-03-12 PROCEDURE — 36415 COLL VENOUS BLD VENIPUNCTURE: CPT | Performed by: ORTHOPAEDIC SURGERY

## 2018-03-12 PROCEDURE — 65270000029 HC RM PRIVATE

## 2018-03-12 PROCEDURE — 77030035643 HC BLD SAW OSC PRECIS STRY -C: Performed by: ORTHOPAEDIC SURGERY

## 2018-03-12 PROCEDURE — 74011000258 HC RX REV CODE- 258: Performed by: PHYSICIAN ASSISTANT

## 2018-03-12 PROCEDURE — 74011250637 HC RX REV CODE- 250/637: Performed by: ANESTHESIOLOGY

## 2018-03-12 PROCEDURE — 77030037875 HC DRSG MEPILEX <16IN BORD MOLN -A: Performed by: ORTHOPAEDIC SURGERY

## 2018-03-12 PROCEDURE — 73560 X-RAY EXAM OF KNEE 1 OR 2: CPT

## 2018-03-12 PROCEDURE — 86900 BLOOD TYPING SEROLOGIC ABO: CPT | Performed by: ORTHOPAEDIC SURGERY

## 2018-03-12 PROCEDURE — 76210000063 HC OR PH I REC FIRST 0.5 HR: Performed by: ORTHOPAEDIC SURGERY

## 2018-03-12 PROCEDURE — 74011250636 HC RX REV CODE- 250/636: Performed by: ANESTHESIOLOGY

## 2018-03-12 PROCEDURE — 74011000250 HC RX REV CODE- 250: Performed by: ORTHOPAEDIC SURGERY

## 2018-03-12 PROCEDURE — 77030027138 HC INCENT SPIROMETER -A: Performed by: ORTHOPAEDIC SURGERY

## 2018-03-12 PROCEDURE — 77030012893

## 2018-03-12 PROCEDURE — 74011250636 HC RX REV CODE- 250/636

## 2018-03-12 PROCEDURE — 77030033067 HC SUT PDO STRATFX SPIR J&J -B: Performed by: ORTHOPAEDIC SURGERY

## 2018-03-12 PROCEDURE — 77030018836 HC SOL IRR NACL ICUM -A: Performed by: ORTHOPAEDIC SURGERY

## 2018-03-12 PROCEDURE — 76010000131 HC OR TIME 2 TO 2.5 HR: Performed by: ORTHOPAEDIC SURGERY

## 2018-03-12 PROCEDURE — 77030011640 HC PAD GRND REM COVD -A: Performed by: ORTHOPAEDIC SURGERY

## 2018-03-12 PROCEDURE — 77010033678 HC OXYGEN DAILY

## 2018-03-12 PROCEDURE — 77030020782 HC GWN BAIR PAWS FLX 3M -B: Performed by: ORTHOPAEDIC SURGERY

## 2018-03-12 PROCEDURE — 64447 NJX AA&/STRD FEMORAL NRV IMG: CPT | Performed by: ANESTHESIOLOGY

## 2018-03-12 PROCEDURE — 77030000032 HC CUF TRNQT ZIMM -B: Performed by: ORTHOPAEDIC SURGERY

## 2018-03-12 PROCEDURE — 74011000250 HC RX REV CODE- 250

## 2018-03-12 PROCEDURE — 74011000258 HC RX REV CODE- 258: Performed by: ORTHOPAEDIC SURGERY

## 2018-03-12 PROCEDURE — 77030036563 HC WRP CLD THER KNE S2SG -B: Performed by: ORTHOPAEDIC SURGERY

## 2018-03-12 PROCEDURE — 77030031139 HC SUT VCRL2 J&J -A: Performed by: ORTHOPAEDIC SURGERY

## 2018-03-12 PROCEDURE — 77030037400 HC ADH TISS HI VISC EXOFIN CHMP -B: Performed by: ORTHOPAEDIC SURGERY

## 2018-03-12 PROCEDURE — 77030003666 HC NDL SPINAL BD -A: Performed by: ORTHOPAEDIC SURGERY

## 2018-03-12 PROCEDURE — 74011250636 HC RX REV CODE- 250/636: Performed by: ORTHOPAEDIC SURGERY

## 2018-03-12 PROCEDURE — 77030013708 HC HNDPC SUC IRR PULS STRY –B: Performed by: ORTHOPAEDIC SURGERY

## 2018-03-12 PROCEDURE — 77030032489 HC SLV COMPR SCD FT CUF COVD -B: Performed by: ORTHOPAEDIC SURGERY

## 2018-03-12 PROCEDURE — C1776 JOINT DEVICE (IMPLANTABLE): HCPCS | Performed by: ORTHOPAEDIC SURGERY

## 2018-03-12 PROCEDURE — 77030016060 HC NDL NRV BLK TELE -A: Performed by: ANESTHESIOLOGY

## 2018-03-12 PROCEDURE — C9290 INJ, BUPIVACAINE LIPOSOME: HCPCS | Performed by: ORTHOPAEDIC SURGERY

## 2018-03-12 PROCEDURE — 77030018846 HC SOL IRR STRL H20 ICUM -A: Performed by: ORTHOPAEDIC SURGERY

## 2018-03-12 PROCEDURE — 97161 PT EVAL LOW COMPLEX 20 MIN: CPT

## 2018-03-12 PROCEDURE — 74011250637 HC RX REV CODE- 250/637: Performed by: PHYSICIAN ASSISTANT

## 2018-03-12 PROCEDURE — 0SRC0J9 REPLACEMENT OF RIGHT KNEE JOINT WITH SYNTHETIC SUBSTITUTE, CEMENTED, OPEN APPROACH: ICD-10-PCS | Performed by: ORTHOPAEDIC SURGERY

## 2018-03-12 PROCEDURE — 74011250636 HC RX REV CODE- 250/636: Performed by: PHYSICIAN ASSISTANT

## 2018-03-12 PROCEDURE — 76942 ECHO GUIDE FOR BIOPSY: CPT | Performed by: ORTHOPAEDIC SURGERY

## 2018-03-12 PROCEDURE — 77030018835 HC SOL IRR LR ICUM -A: Performed by: ORTHOPAEDIC SURGERY

## 2018-03-12 PROCEDURE — 97116 GAIT TRAINING THERAPY: CPT

## 2018-03-12 PROCEDURE — 74011000250 HC RX REV CODE- 250: Performed by: PHYSICIAN ASSISTANT

## 2018-03-12 DEVICE — COMPNT FEM CR TRIATHLN 5 R PA --: Type: IMPLANTABLE DEVICE | Site: KNEE | Status: FUNCTIONAL

## 2018-03-12 DEVICE — COMPONENT PAT DIA35MM THK10MM SUPERIOR/INFERIOR KNEE: Type: IMPLANTABLE DEVICE | Site: KNEE | Status: FUNCTIONAL

## 2018-03-12 DEVICE — COMPONENT TOT KNEE HYBRID POROUS X3 TRIATHLON: Type: IMPLANTABLE DEVICE | Site: KNEE | Status: FUNCTIONAL

## 2018-03-12 DEVICE — INSERT TIB ARTC BRNG SZ5 9MM -- TRIATHLON X3: Type: IMPLANTABLE DEVICE | Site: KNEE | Status: FUNCTIONAL

## 2018-03-12 DEVICE — BASEPLT TIB PC TRITNM SZ 5 -- TRIATHLON: Type: IMPLANTABLE DEVICE | Site: KNEE | Status: FUNCTIONAL

## 2018-03-12 RX ORDER — DEXTROSE 50 % IN WATER (D50W) INTRAVENOUS SYRINGE
25-50 AS NEEDED
Status: DISCONTINUED | OUTPATIENT
Start: 2018-03-12 | End: 2018-03-12 | Stop reason: HOSPADM

## 2018-03-12 RX ORDER — PREGABALIN 50 MG/1
50 CAPSULE ORAL
Status: COMPLETED | OUTPATIENT
Start: 2018-03-12 | End: 2018-03-12

## 2018-03-12 RX ORDER — LANOLIN ALCOHOL/MO/W.PET/CERES
1 CREAM (GRAM) TOPICAL 3 TIMES DAILY
Status: DISCONTINUED | OUTPATIENT
Start: 2018-03-12 | End: 2018-03-13 | Stop reason: HOSPADM

## 2018-03-12 RX ORDER — PANTOPRAZOLE SODIUM 40 MG/1
40 TABLET, DELAYED RELEASE ORAL DAILY
Status: DISCONTINUED | OUTPATIENT
Start: 2018-03-13 | End: 2018-03-13 | Stop reason: HOSPADM

## 2018-03-12 RX ORDER — SODIUM CHLORIDE 0.9 % (FLUSH) 0.9 %
5-10 SYRINGE (ML) INJECTION AS NEEDED
Status: DISCONTINUED | OUTPATIENT
Start: 2018-03-12 | End: 2018-03-12 | Stop reason: HOSPADM

## 2018-03-12 RX ORDER — DEXAMETHASONE SODIUM PHOSPHATE 4 MG/ML
INJECTION, SOLUTION INTRA-ARTICULAR; INTRALESIONAL; INTRAMUSCULAR; INTRAVENOUS; SOFT TISSUE AS NEEDED
Status: DISCONTINUED | OUTPATIENT
Start: 2018-03-12 | End: 2018-03-12 | Stop reason: HOSPADM

## 2018-03-12 RX ORDER — SODIUM CHLORIDE 0.9 % (FLUSH) 0.9 %
5-10 SYRINGE (ML) INJECTION EVERY 8 HOURS
Status: DISCONTINUED | OUTPATIENT
Start: 2018-03-12 | End: 2018-03-13 | Stop reason: HOSPADM

## 2018-03-12 RX ORDER — ACETAMINOPHEN 10 MG/ML
1000 INJECTION, SOLUTION INTRAVENOUS ONCE
Status: COMPLETED | OUTPATIENT
Start: 2018-03-12 | End: 2018-03-12

## 2018-03-12 RX ORDER — FENTANYL CITRATE 50 UG/ML
50 INJECTION, SOLUTION INTRAMUSCULAR; INTRAVENOUS
Status: DISCONTINUED | OUTPATIENT
Start: 2018-03-12 | End: 2018-03-12 | Stop reason: HOSPADM

## 2018-03-12 RX ORDER — DIPHENHYDRAMINE HCL 25 MG
25 CAPSULE ORAL
Status: DISCONTINUED | OUTPATIENT
Start: 2018-03-12 | End: 2018-03-13 | Stop reason: HOSPADM

## 2018-03-12 RX ORDER — CEVIMELINE HYDROCHLORIDE 30 MG/1
30 CAPSULE ORAL 3 TIMES DAILY
Status: DISCONTINUED | OUTPATIENT
Start: 2018-03-12 | End: 2018-03-13 | Stop reason: HOSPADM

## 2018-03-12 RX ORDER — ONDANSETRON 2 MG/ML
4 INJECTION INTRAMUSCULAR; INTRAVENOUS
Status: DISCONTINUED | OUTPATIENT
Start: 2018-03-12 | End: 2018-03-13 | Stop reason: HOSPADM

## 2018-03-12 RX ORDER — SODIUM CHLORIDE, SODIUM LACTATE, POTASSIUM CHLORIDE, CALCIUM CHLORIDE 600; 310; 30; 20 MG/100ML; MG/100ML; MG/100ML; MG/100ML
1000 INJECTION, SOLUTION INTRAVENOUS CONTINUOUS
Status: DISCONTINUED | OUTPATIENT
Start: 2018-03-12 | End: 2018-03-12 | Stop reason: HOSPADM

## 2018-03-12 RX ORDER — MAGNESIUM SULFATE 100 %
4 CRYSTALS MISCELLANEOUS AS NEEDED
Status: DISCONTINUED | OUTPATIENT
Start: 2018-03-12 | End: 2018-03-12 | Stop reason: HOSPADM

## 2018-03-12 RX ORDER — FLUMAZENIL 0.1 MG/ML
0.2 INJECTION INTRAVENOUS
Status: DISCONTINUED | OUTPATIENT
Start: 2018-03-12 | End: 2018-03-12 | Stop reason: HOSPADM

## 2018-03-12 RX ORDER — FENTANYL CITRATE 50 UG/ML
INJECTION, SOLUTION INTRAMUSCULAR; INTRAVENOUS AS NEEDED
Status: DISCONTINUED | OUTPATIENT
Start: 2018-03-12 | End: 2018-03-12 | Stop reason: HOSPADM

## 2018-03-12 RX ORDER — LIDOCAINE HYDROCHLORIDE 20 MG/ML
INJECTION, SOLUTION EPIDURAL; INFILTRATION; INTRACAUDAL; PERINEURAL AS NEEDED
Status: DISCONTINUED | OUTPATIENT
Start: 2018-03-12 | End: 2018-03-12 | Stop reason: HOSPADM

## 2018-03-12 RX ORDER — NALOXONE HYDROCHLORIDE 0.4 MG/ML
0.4 INJECTION, SOLUTION INTRAMUSCULAR; INTRAVENOUS; SUBCUTANEOUS AS NEEDED
Status: DISCONTINUED | OUTPATIENT
Start: 2018-03-12 | End: 2018-03-13 | Stop reason: HOSPADM

## 2018-03-12 RX ORDER — DOCUSATE SODIUM 100 MG/1
100 CAPSULE, LIQUID FILLED ORAL 2 TIMES DAILY
Status: DISCONTINUED | OUTPATIENT
Start: 2018-03-12 | End: 2018-03-13 | Stop reason: HOSPADM

## 2018-03-12 RX ORDER — SODIUM CHLORIDE, SODIUM LACTATE, POTASSIUM CHLORIDE, CALCIUM CHLORIDE 600; 310; 30; 20 MG/100ML; MG/100ML; MG/100ML; MG/100ML
125 INJECTION, SOLUTION INTRAVENOUS CONTINUOUS
Status: DISCONTINUED | OUTPATIENT
Start: 2018-03-12 | End: 2018-03-12 | Stop reason: HOSPADM

## 2018-03-12 RX ORDER — ROPIVACAINE HYDROCHLORIDE 5 MG/ML
INJECTION, SOLUTION EPIDURAL; INFILTRATION; PERINEURAL AS NEEDED
Status: DISCONTINUED | OUTPATIENT
Start: 2018-03-12 | End: 2018-03-12 | Stop reason: HOSPADM

## 2018-03-12 RX ORDER — GLYCOPYRROLATE 0.2 MG/ML
INJECTION INTRAMUSCULAR; INTRAVENOUS AS NEEDED
Status: DISCONTINUED | OUTPATIENT
Start: 2018-03-12 | End: 2018-03-12 | Stop reason: HOSPADM

## 2018-03-12 RX ORDER — LORAZEPAM 2 MG/ML
1 INJECTION INTRAMUSCULAR
Status: DISCONTINUED | OUTPATIENT
Start: 2018-03-12 | End: 2018-03-13 | Stop reason: HOSPADM

## 2018-03-12 RX ORDER — SODIUM CHLORIDE, SODIUM LACTATE, POTASSIUM CHLORIDE, CALCIUM CHLORIDE 600; 310; 30; 20 MG/100ML; MG/100ML; MG/100ML; MG/100ML
125 INJECTION, SOLUTION INTRAVENOUS CONTINUOUS
Status: DISCONTINUED | OUTPATIENT
Start: 2018-03-12 | End: 2018-03-13 | Stop reason: HOSPADM

## 2018-03-12 RX ORDER — CEFAZOLIN SODIUM 2 G/50ML
2 SOLUTION INTRAVENOUS ONCE
Status: COMPLETED | OUTPATIENT
Start: 2018-03-12 | End: 2018-03-12

## 2018-03-12 RX ORDER — INSULIN LISPRO 100 [IU]/ML
INJECTION, SOLUTION INTRAVENOUS; SUBCUTANEOUS ONCE
Status: DISCONTINUED | OUTPATIENT
Start: 2018-03-12 | End: 2018-03-12 | Stop reason: HOSPADM

## 2018-03-12 RX ORDER — PROPOFOL 10 MG/ML
INJECTION, EMULSION INTRAVENOUS AS NEEDED
Status: DISCONTINUED | OUTPATIENT
Start: 2018-03-12 | End: 2018-03-12 | Stop reason: HOSPADM

## 2018-03-12 RX ORDER — ASPIRIN 325 MG
325 TABLET ORAL 2 TIMES DAILY
Status: DISCONTINUED | OUTPATIENT
Start: 2018-03-12 | End: 2018-03-13 | Stop reason: HOSPADM

## 2018-03-12 RX ORDER — ONDANSETRON 2 MG/ML
INJECTION INTRAMUSCULAR; INTRAVENOUS AS NEEDED
Status: DISCONTINUED | OUTPATIENT
Start: 2018-03-12 | End: 2018-03-12 | Stop reason: HOSPADM

## 2018-03-12 RX ORDER — NALOXONE HYDROCHLORIDE 0.4 MG/ML
0.1 INJECTION, SOLUTION INTRAMUSCULAR; INTRAVENOUS; SUBCUTANEOUS AS NEEDED
Status: DISCONTINUED | OUTPATIENT
Start: 2018-03-12 | End: 2018-03-12 | Stop reason: HOSPADM

## 2018-03-12 RX ORDER — EPHEDRINE SULFATE/0.9% NACL/PF 25 MG/5 ML
SYRINGE (ML) INTRAVENOUS AS NEEDED
Status: DISCONTINUED | OUTPATIENT
Start: 2018-03-12 | End: 2018-03-12 | Stop reason: HOSPADM

## 2018-03-12 RX ORDER — SODIUM CHLORIDE, SODIUM LACTATE, POTASSIUM CHLORIDE, CALCIUM CHLORIDE 600; 310; 30; 20 MG/100ML; MG/100ML; MG/100ML; MG/100ML
75 INJECTION, SOLUTION INTRAVENOUS CONTINUOUS
Status: DISCONTINUED | OUTPATIENT
Start: 2018-03-12 | End: 2018-03-13 | Stop reason: HOSPADM

## 2018-03-12 RX ORDER — MIDAZOLAM HYDROCHLORIDE 1 MG/ML
INJECTION, SOLUTION INTRAMUSCULAR; INTRAVENOUS AS NEEDED
Status: DISCONTINUED | OUTPATIENT
Start: 2018-03-12 | End: 2018-03-12 | Stop reason: HOSPADM

## 2018-03-12 RX ORDER — KETOROLAC TROMETHAMINE 30 MG/ML
INJECTION, SOLUTION INTRAMUSCULAR; INTRAVENOUS AS NEEDED
Status: DISCONTINUED | OUTPATIENT
Start: 2018-03-12 | End: 2018-03-12 | Stop reason: HOSPADM

## 2018-03-12 RX ORDER — CEFAZOLIN SODIUM 2 G/50ML
2 SOLUTION INTRAVENOUS EVERY 8 HOURS
Status: COMPLETED | OUTPATIENT
Start: 2018-03-12 | End: 2018-03-13

## 2018-03-12 RX ORDER — CELECOXIB 100 MG/1
200 CAPSULE ORAL
Status: COMPLETED | OUTPATIENT
Start: 2018-03-12 | End: 2018-03-12

## 2018-03-12 RX ORDER — OXYCODONE AND ACETAMINOPHEN 5; 325 MG/1; MG/1
1-2 TABLET ORAL
Status: DISCONTINUED | OUTPATIENT
Start: 2018-03-12 | End: 2018-03-13 | Stop reason: HOSPADM

## 2018-03-12 RX ORDER — HYDROMORPHONE HYDROCHLORIDE 2 MG/ML
1 INJECTION, SOLUTION INTRAMUSCULAR; INTRAVENOUS; SUBCUTANEOUS
Status: DISCONTINUED | OUTPATIENT
Start: 2018-03-12 | End: 2018-03-13 | Stop reason: HOSPADM

## 2018-03-12 RX ORDER — SODIUM CHLORIDE 0.9 % (FLUSH) 0.9 %
5-10 SYRINGE (ML) INJECTION AS NEEDED
Status: DISCONTINUED | OUTPATIENT
Start: 2018-03-12 | End: 2018-03-13 | Stop reason: HOSPADM

## 2018-03-12 RX ADMIN — FENTANYL CITRATE 50 MCG: 50 INJECTION, SOLUTION INTRAMUSCULAR; INTRAVENOUS at 15:18

## 2018-03-12 RX ADMIN — SODIUM CHLORIDE, SODIUM LACTATE, POTASSIUM CHLORIDE, AND CALCIUM CHLORIDE 125 ML/HR: 600; 310; 30; 20 INJECTION, SOLUTION INTRAVENOUS at 15:14

## 2018-03-12 RX ADMIN — PREGABALIN 50 MG: 50 CAPSULE ORAL at 11:55

## 2018-03-12 RX ADMIN — ONDANSETRON 4 MG: 2 INJECTION INTRAMUSCULAR; INTRAVENOUS at 17:20

## 2018-03-12 RX ADMIN — FENTANYL CITRATE 25 MCG: 50 INJECTION, SOLUTION INTRAMUSCULAR; INTRAVENOUS at 14:18

## 2018-03-12 RX ADMIN — FENTANYL CITRATE 25 MCG: 50 INJECTION, SOLUTION INTRAMUSCULAR; INTRAVENOUS at 12:29

## 2018-03-12 RX ADMIN — SODIUM CHLORIDE, SODIUM LACTATE, POTASSIUM CHLORIDE, AND CALCIUM CHLORIDE: 600; 310; 30; 20 INJECTION, SOLUTION INTRAVENOUS at 12:55

## 2018-03-12 RX ADMIN — FENTANYL CITRATE 25 MCG: 50 INJECTION, SOLUTION INTRAMUSCULAR; INTRAVENOUS at 12:56

## 2018-03-12 RX ADMIN — CEFAZOLIN SODIUM 2 G: 2 SOLUTION INTRAVENOUS at 12:23

## 2018-03-12 RX ADMIN — CEFAZOLIN SODIUM 2 G: 2 SOLUTION INTRAVENOUS at 20:00

## 2018-03-12 RX ADMIN — FENTANYL CITRATE 25 MCG: 50 INJECTION, SOLUTION INTRAMUSCULAR; INTRAVENOUS at 12:32

## 2018-03-12 RX ADMIN — FERROUS SULFATE TAB 325 MG (65 MG ELEMENTAL FE) 325 MG: 325 (65 FE) TAB at 20:55

## 2018-03-12 RX ADMIN — DEXAMETHASONE SODIUM PHOSPHATE 4 MG: 4 INJECTION, SOLUTION INTRA-ARTICULAR; INTRALESIONAL; INTRAMUSCULAR; INTRAVENOUS; SOFT TISSUE at 12:35

## 2018-03-12 RX ADMIN — GLYCOPYRROLATE 0.2 MG: 0.2 INJECTION INTRAMUSCULAR; INTRAVENOUS at 12:36

## 2018-03-12 RX ADMIN — MIDAZOLAM HYDROCHLORIDE 2 MG: 1 INJECTION, SOLUTION INTRAMUSCULAR; INTRAVENOUS at 12:06

## 2018-03-12 RX ADMIN — Medication 10 MG: at 13:44

## 2018-03-12 RX ADMIN — OXYCODONE HYDROCHLORIDE AND ACETAMINOPHEN 2 TABLET: 5; 325 TABLET ORAL at 22:13

## 2018-03-12 RX ADMIN — MIDAZOLAM HYDROCHLORIDE 2 MG: 1 INJECTION, SOLUTION INTRAMUSCULAR; INTRAVENOUS at 12:15

## 2018-03-12 RX ADMIN — ROPIVACAINE HYDROCHLORIDE 20 ML: 5 INJECTION, SOLUTION EPIDURAL; INFILTRATION; PERINEURAL at 12:11

## 2018-03-12 RX ADMIN — TRANEXAMIC ACID 1 G: 100 INJECTION, SOLUTION INTRAVENOUS at 12:27

## 2018-03-12 RX ADMIN — FENTANYL CITRATE 25 MCG: 50 INJECTION, SOLUTION INTRAMUSCULAR; INTRAVENOUS at 13:37

## 2018-03-12 RX ADMIN — FERROUS SULFATE TAB 325 MG (65 MG ELEMENTAL FE) 325 MG: 325 (65 FE) TAB at 18:30

## 2018-03-12 RX ADMIN — FENTANYL CITRATE 25 MCG: 50 INJECTION, SOLUTION INTRAMUSCULAR; INTRAVENOUS at 13:10

## 2018-03-12 RX ADMIN — ONDANSETRON 4 MG: 2 INJECTION INTRAMUSCULAR; INTRAVENOUS at 12:36

## 2018-03-12 RX ADMIN — OXYCODONE HYDROCHLORIDE AND ACETAMINOPHEN 2 TABLET: 5; 325 TABLET ORAL at 18:30

## 2018-03-12 RX ADMIN — PROPOFOL 150 MG: 10 INJECTION, EMULSION INTRAVENOUS at 12:22

## 2018-03-12 RX ADMIN — KETOROLAC TROMETHAMINE 30 MG: 30 INJECTION, SOLUTION INTRAMUSCULAR; INTRAVENOUS at 13:45

## 2018-03-12 RX ADMIN — MIDAZOLAM HYDROCHLORIDE 2 MG: 1 INJECTION, SOLUTION INTRAMUSCULAR; INTRAVENOUS at 12:04

## 2018-03-12 RX ADMIN — DOCUSATE SODIUM 100 MG: 100 CAPSULE, LIQUID FILLED ORAL at 20:55

## 2018-03-12 RX ADMIN — FENTANYL CITRATE 25 MCG: 50 INJECTION, SOLUTION INTRAMUSCULAR; INTRAVENOUS at 13:50

## 2018-03-12 RX ADMIN — ASPIRIN 325 MG ORAL TABLET 325 MG: 325 PILL ORAL at 20:55

## 2018-03-12 RX ADMIN — Medication 10 MG: at 12:43

## 2018-03-12 RX ADMIN — LIDOCAINE HYDROCHLORIDE 40 MG: 20 INJECTION, SOLUTION EPIDURAL; INFILTRATION; INTRACAUDAL; PERINEURAL at 12:22

## 2018-03-12 RX ADMIN — FENTANYL CITRATE 25 MCG: 50 INJECTION, SOLUTION INTRAMUSCULAR; INTRAVENOUS at 14:10

## 2018-03-12 RX ADMIN — FENTANYL CITRATE 50 MCG: 50 INJECTION, SOLUTION INTRAMUSCULAR; INTRAVENOUS at 15:13

## 2018-03-12 RX ADMIN — ACETAMINOPHEN 1000 MG: 10 INJECTION, SOLUTION INTRAVENOUS at 12:31

## 2018-03-12 RX ADMIN — SODIUM CHLORIDE, SODIUM LACTATE, POTASSIUM CHLORIDE, AND CALCIUM CHLORIDE 75 ML/HR: 600; 310; 30; 20 INJECTION, SOLUTION INTRAVENOUS at 17:00

## 2018-03-12 RX ADMIN — CELECOXIB 200 MG: 100 CAPSULE ORAL at 11:55

## 2018-03-12 RX ADMIN — SODIUM CHLORIDE, SODIUM LACTATE, POTASSIUM CHLORIDE, AND CALCIUM CHLORIDE 125 ML/HR: 600; 310; 30; 20 INJECTION, SOLUTION INTRAVENOUS at 10:38

## 2018-03-12 NOTE — BRIEF OP NOTE
BRIEF OPERATIVE NOTE    Date of Procedure: 3/12/2018   Preoperative Diagnosis: UNILATERAL PRIMARY OSTEOARTHRITIS, RIGHT KNEE  Postoperative Diagnosis: UNILATERAL PRIMARY OSTEOARTHRITIS, RIGHT KNEE    Procedure(s):  RIGHT TOTAL KNEE REPLACMENT  Surgeon(s) and Role:     * Duy Hernandez MD - Primary         Assistant Staff: Aarti Arredondo PA-C      Surgical Staff:  Circ-1: Yong Rico RN  Scrub Tech-1: Jacqueline Null  Scrub RN-1: Jeremiah Crandall RN  Event Time In   Incision Start 1244   Incision Close 1419     Anesthesia: General   Estimated Blood Loss: 150 mL  Specimens: * No specimens in log *   Findings: Severe DJD   Complications: None  Implants:   Implant Name Type Inv.  Item Serial No.  Lot No. LRB No. Used Action   BASEPLT TIB PC TRITNM SZ 5 -- TRIATHLON - [id number]  BASEPLT TIB PC TRITNM SZ 5 -- TRIATHLON  [employer] [facility] [id number] Right 1 Implanted   INSERT TIB ARTC BRNG SZ5 9MM -- TRIATHLON X3 - [id number]  INSERT TIB ARTC BRNG SZ5 9MM -- TRIATHLON X3  [employer] [facility] [id number] Right 1 Implanted   COMPNT FEM CR TRIATHLN 5 R PA --  - [id number]  COMPNT FEM CR TRIATHLN 5 R PA --   [employer] HOW [id number] Right 1 Implanted   PAT ASYM MTL-BK 10MM SZ A35 -- TRIATHLON - [id number]   PAT ASYM MTL-BK 10MM SZ A35 -- TRIATHLON   [employer] HOW [id number] Right 1 Implanted

## 2018-03-12 NOTE — PERIOP NOTES
TRANSFER - OUT REPORT:    Verbal report given to Giovana Gold RN (name) on [employer]  being transferred to  (unit) for routine progression of care       Report consisted of patients Situation, Background, Assessment and   Recommendations(SBAR). Information from the following report(s) SBAR, Kardex, OR Summary, Procedure Summary, Intake/Output, MAR and Recent Results was reviewed with the receiving nurse. Lines:   Peripheral IV 03/12/18 Left Hand (Active)   Site Assessment Clean, dry, & intact 3/12/2018  2:30 PM   Phlebitis Assessment 0 3/12/2018  2:30 PM   Infiltration Assessment 0 3/12/2018  2:30 PM   Dressing Status Clean, dry, & intact 3/12/2018  2:30 PM   Dressing Type Transparent 3/12/2018  2:30 PM   Hub Color/Line Status Green; Infusing 3/12/2018  2:30 PM        Opportunity for questions and clarification was provided.       Patient transported with:   O2 @ 2 liters  Registered Nurse  [facility]

## 2018-03-12 NOTE — OP NOTES
[address] [address],[address]   Right Total Knee Arthroplasty          Date of Surgery: 3/12/2018   Preoperative Diagnosis: UNILATERAL PRIMARY OSTEOARTHRITIS, RIGHT KNEE   Postoperative Diagnosis: UNILATERAL PRIMARY OSTEOARTHRITIS, RIGHT KNEE   Location: [facility]  Surgeon: Leola Martinez MD  Assistant:  Lu ASHLEY  Anesthesia: General and Adductor Canal Block    Procedure: Right Total Knee Arthroplasty    Findings:  Degenerative joint disease of the right knee. Estimated Blood Loss:  150 cc    Specimens: None    Implants:   Implant Name Type Inv. Item Serial No.  Lot No. LRB No. Used Action   BASEPLT TIB PC TRITNM SZ 5 -- TRIATHLON - [id number]  BASEPLT TIB PC TRITNM SZ 5 -- TRIATHLON  [employer] HOW [id number] Right 1 Implanted   INSERT TIB ARTC BRNG SZ5 9MM -- TRIATHLON X3 - [id number]  INSERT TIB ARTC BRNG SZ5 9MM -- TRIATHLON X3  [employer] HOW [id number] Right 1 Implanted   COMPNT FEM CR TRIATHLN 5 R PA --  - [id number]  COMPNT FEM CR TRIATHLN 5 R PA --   [employer] HOW [id number] Right 1 Implanted   PAT ASYM MTL-BK 10MM SZ A35 -- TRIATHLON - [id number]   PAT ASYM MTL-BK 10MM SZ A35 -- TRIATHLON   [employer] HOW [id number] Right 1 Implanted       OPERATIVE PROCEDURE IN DETAIL: The patient was taken to the operating room, placed in supine position on the operating table. After satisfactory general anesthesia was established, tourniquet was placed on the right thigh. The right leg was prepped with ChloraPrep and draped in a sterile fashion. A time-out was accomplished. Esmarch bandage was used to exsanguinate the leg. Tourniquet was inflated to 280 mmHg. Anterior midline incision was made, length of approximately 4-1/2 inches. Incision was made through the skin and subcutaneous tissue. Medial parapatellar incision was made. The patella was everted and held with towel clips. The thickness was measured at 24 mm.   The preliminary cut was made using the oscillating saw. The final preparation was not done at this time. Attention was directed to the femur. Osteophytes were removed as they were encountered. Drill hole was made in the depth of the intercondylar notch. This was followed by the intramedullary whitney with the distal cut guide. The guide was held in place with 3 pins. Remaining jigs were removed and the distal femur was cut at this time. The femur was measured and noted to be the size 5. The multi cut femoral block was placed on the distal femur, held in place with 2 pegs and 2 pins. The , anterior, posterior, posterior chamfer, and anterior chamfer cuts were made at this time. The remaining pins and jig were removed at this time. Bone was removed using an osteotome. Curved osteotome was placed on the back of the distal femur to release any osteophytes and contractures at this time. Attention was directed to the tibia. Any remaining meniscal components were removed. The posterior and lateral retractors were placed. Care being taken to avoid excess pressure on the lateral retractor. The extramedullary tibial guide system was used. It was held in position and adjusted for alignment. The cutting guide was measured at approximately 9 mm off the high side. The cutting block was pinned in place. The remaining jigs were removed. The alignment whitney was placed on the tibial cutting block to help with alignment. The proximal tibia was cut at this time. The bone was removed. The spacer block was used and was satisfactory in flexion and extension. The tibia was sized and noted to be the size indicated above. The tibial baseplate with was placed at this time. The tibial baseplate was fixed with two pins. The tibial trial poly was placed on the baseplate. The femoral component was impacted into position. The knee was placed through a range of motion which was noted to be satisfactory in flexion and extension.   Good stability was noted in both flexion and extension. Attention was directed back to the patella. The patella was again everted a maximum of 10 mm of patella was removed from the initial measurement with the measurement now being 14 mm. The patella was sized and noted to be the size indicated above. The lug holes were drilled at this time. The knee was placed in flexion. The femoral lug holes were drilled. The guide for the tibial finned punch was placed and the finned punch was use at this time. The finned punch was removed and the guide for the tibial pegs was placed. All 4 peg holes were made. Any sclerotic areas were multiply drilled. Pulse lavage irrigation was used at this time. The tibial base plate was impacted into position. The polyethylene component was placed and impacted into position. The femoral component was impacted into position. The knee was placed in extension. The patella was then placed and compressed with a clamp. The knee was checked in flexion and extension for stability in varus and valgus stress. The patella tracked well without the need for lateral release. The tourniquet was deflated. Exparel was placed in the wound edges and the periosteum. The parapatellar incision was closed using interrupted #1 Vicryl figure-of-eight sutures followed by the Stratafix. The knee was injected with 4 mg MS, 30 mg toradol and 30cc of .5% marcaine with epi - the knee was water tight. Subcutaneous tissue was closed using 2-0 Monocryl and the skin was closed with a subcuticular 3-0 Monocryl. The Prineo system was used, followed by a Mepilex dressing. The patient tolerated the procedure well, was awakened from anesthesia, transferred onto the recovery room bed and taken to recovery room in stable condition.      Desi Costa MD 3/12/2018 6:34 PM

## 2018-03-12 NOTE — ANESTHESIA PREPROCEDURE EVALUATION
Anesthetic History   No history of anesthetic complications            Review of Systems / Medical History  Patient summary reviewed, nursing notes reviewed and pertinent labs reviewed    Pulmonary  Within defined limits                 Neuro/Psych   Within defined limits           Cardiovascular    Hypertension              Exercise tolerance: >4 METS     GI/Hepatic/Renal     GERD: well controlled           Endo/Other        Morbid obesity and arthritis     Other Findings              Physical Exam    Airway  Mallampati: III  TM Distance: 4 - 6 cm  Neck ROM: normal range of motion   Mouth opening: Normal     Cardiovascular  Regular rate and rhythm,  S1 and S2 normal,  no murmur, click, rub, or gallop  Rhythm: regular  Rate: normal         Dental  No notable dental hx       Pulmonary  Breath sounds clear to auscultation               Abdominal  GI exam deferred       Other Findings            Anesthetic Plan    ASA: 2  Anesthesia type: general      Post-op pain plan if not by surgeon: peripheral nerve block single      Anesthetic plan and risks discussed with: Patient

## 2018-03-12 NOTE — OP NOTES
[address] [address]   Right Total Knee Arthroplasty          Date of Surgery: 3/12/2018   Preoperative Diagnosis: UNILATERAL PRIMARY OSTEOARTHRITIS, RIGHT KNEE   Postoperative Diagnosis: UNILATERAL PRIMARY OSTEOARTHRITIS, RIGHT KNEE   Location: [facility]  Surgeon: Ban Hoffman MD  Assistant:  Allan ASHLEY  Anesthesia: General and Adductor Canal Block    Procedure: Right Total Knee Arthroplasty    Findings:  Degenerative joint disease of the right knee. Estimated Blood Loss:  150 cc    Specimens: None    Implants:   Implant Name Type Inv. Item Serial No.  Lot No. LRB No. Used Action   BASEPLT TIB PC TRITNM SZ 5 -- TRIATHLON - [id number]  BASEPLT TIB PC TRITNM SZ 5 -- TRIATHLON  [employer] HOW [id number] Right 1 Implanted   INSERT TIB ARTC BRNG SZ5 9MM -- TRIATHLON X3 - [id number]  INSERT TIB ARTC BRNG SZ5 9MM -- TRIATHLON X3  [employer] HOW [id number] Right 1 Implanted   COMPNT FEM CR TRIATHLN 5 R PA --  - [id number]  COMPNT FEM CR TRIATHLN 5 R PA --   [employer] HOWM [id number] Right 1 Implanted   PAT ASYM MTL-BK 10MM SZ A35 -- TRIATHLON - [id number]   PAT ASYM MTL-BK 10MM SZ A35 -- TRIATHLON   [employer] HOWM [id number] Right 1 Implanted       OPERATIVE PROCEDURE IN DETAIL: The patient was taken to the operating room, placed in supine position on the operating table. After satisfactory general anesthesia was established, tourniquet was placed on the right thigh. The right leg was prepped with ChloraPrep and draped in a sterile fashion. A time-out was accomplished. Esmarch bandage was used to exsanguinate the leg. Tourniquet was inflated to 280 mmHg. Anterior midline incision was made, length of approximately 4-1/2 inches. Incision was made through the skin and subcutaneous tissue. Medial parapatellar incision was made. The patella was everted and held with towel clips. The thickness was measured at 24 mm.   The preliminary cut was made using the oscillating saw. The final preparation was not done at this time. Attention was directed to the femur. Osteophytes were removed as they were encountered. Drill hole was made in the depth of the intercondylar notch. This was followed by the intramedullary whitney with the distal cut guide. The guide was held in place with 3 pins. Remaining jigs were removed and the distal femur was cut at this time. The femur was measured and noted to be the size 5. The multi cut femoral block was placed on the distal femur, held in place with 2 pegs and 2 pins. The , anterior, posterior, posterior chamfer, and anterior chamfer cuts were made at this time. The remaining pins and jig were removed at this time. Bone was removed using an osteotome. Curved osteotome was placed on the back of the distal femur to release any osteophytes and contractures at this time. Attention was directed to the tibia. Any remaining meniscal components were removed. The posterior and lateral retractors were placed. Care being taken to avoid excess pressure on the lateral retractor. The extramedullary tibial guide system was used. It was held in position and adjusted for alignment. The cutting guide was measured at approximately 9 mm off the high side. The cutting block was pinned in place. The remaining jigs were removed. The alignment whitney was placed on the tibial cutting block to help with alignment. The proximal tibia was cut at this time. The bone was removed. The spacer block was used and was satisfactory in flexion and extension. The tibia was sized and noted to be the size indicated above. The tibial baseplate with was placed at this time. The tibial baseplate was fixed with two pins. The tibial trial poly was placed on the baseplate. The femoral component was impacted into position. The knee was placed through a range of motion which was noted to be satisfactory in flexion and extension.   Good stability was noted in both flexion and extension. Attention was directed back to the patella. The patella was again everted a maximum of 10 mm of patella was removed from the initial measurement with the measurement now being 14 mm. The patella was sized and noted to be the size indicated above. The lug holes were drilled at this time. The knee was placed in flexion. The femoral lug holes were drilled. The guide for the tibial finned punch was placed and the finned punch was use at this time. The finned punch was removed and the guide for the tibial pegs was placed. All 4 peg holes were made. Any sclerotic areas were multiply drilled. Pulse lavage irrigation was used at this time. The tibial base plate was impacted into position. The polyethylene component was placed and impacted into position. The femoral component was impacted into position. The knee was placed in extension. The patella was then placed and compressed with a clamp. The knee was checked in flexion and extension for stability in varus and valgus stress. The patella tracked well without the need for lateral release. The tourniquet was deflated. Exparel was placed in the wound edges and the periosteum. The parapatellar incision was closed using interrupted #1 Vicryl figure-of-eight sutures followed by the Stratafix. The knee was injected with 4 mg MS, 30 mg toradol and 30cc of .5% marcaine with epi - the knee was water tight. Subcutaneous tissue was closed using 2-0 Monocryl and the skin was closed with a subcuticular 3-0 Monocryl. The Prineo system was used, followed by a Mepilex dressing. The patient tolerated the procedure well, was awakened from anesthesia, transferred onto the recovery room bed and taken to recovery room in stable condition.      Dara Bueno MD 3/12/2018 6:41 PM

## 2018-03-12 NOTE — ANESTHESIA POSTPROCEDURE EVALUATION
Post-Anesthesia Evaluation and Assessment    Cardiovascular Function/Vital Signs  Visit Vitals    /75    Pulse 85    Temp 37.6 °C (99.7 °F)    Resp 17    Ht 5' 5\" (1.651 m)    Wt 85.4 kg (188 lb 3 oz)    SpO2 97%    BMI 31.32 kg/m2       Patient is status post Procedure(s):  RIGHT TOTAL KNEE REPLACMENT. Nausea/Vomiting: Controlled. Postoperative hydration reviewed and adequate. Pain:  Pain Scale 1: Numeric (0 - 10) (03/12/18 1445)  Pain Intensity 1: 0 (03/12/18 1445)   Managed. Neurological Status:   Neuro (WDL): (P) Within Defined Limits (03/12/18 1445)   At baseline. Mental Status and Level of Consciousness: Baseline and stable. Pulmonary Status:   O2 Device: Nasal cannula (03/12/18 1435)   Adequate oxygenation and airway patent. Complications related to anesthesia: None    Post-anesthesia assessment completed. No concerns. Patient has met all discharge requirements.     Signed By: Trina Roach MD

## 2018-03-12 NOTE — IP AVS SNAPSHOT
[place] 
 
 
 [address] [facility] [address] 
[phone] Patient: Ramirez Lopez MRN: [id number] BZM:[birth year] A check jeb indicates which time of day the medication should be taken. My Medications START taking these medications Instructions Each Dose to Equal  
 Morning Noon Evening Bedtime  
 aspirin 325 mg tablet Commonly known as:  ASPIRIN Your last dose was: Your next dose is: Take 1 Tab by mouth two (2) times a day. 325 mg  
    
   
   
   
  
 oxyCODONE-acetaminophen 5-325 mg per tablet Commonly known as:  PERCOCET Your last dose was: Your next dose is: Take 1-2 Tabs by mouth every four (4) hours as needed. Max Daily Amount: 12 Tabs. 1-2 Tab CHANGE how you take these medications Instructions Each Dose to Equal  
 Morning Noon Evening Bedtime  
 cevimeline 30 mg capsule Commonly known as:  [facility] What changed:   
- how much to take - when to take this 
- additional instructions Your last dose was: Your next dose is:    
   
   
 take 1 capsule by mouth three times a day  
     
   
   
   
  
 pantoprazole 40 mg tablet Commonly known as:  PROTONIX What changed:   
- when to take this 
- additional instructions Your last dose was: Your next dose is:    
   
   
 take 1 tablet by mouth once daily STOP taking these medications CALCIUM WITH VITAMIN D PO  
   
  
 multivitamin tablet Commonly known as:  ONE A DAY TYLENOL ARTHRITIS PAIN 650 mg Arvid [address]ane Generic drug:  acetaminophen TYLENOL PM PO Where to Get Your Medications Information on where to get these meds will be given to you by the nurse or doctor. ! Ask your nurse or doctor about these medications  
  aspirin 325 mg tablet  
 oxyCODONE-acetaminophen 5-325 mg per tablet

## 2018-03-12 NOTE — IP AVS SNAPSHOT
[facility] 
 
 
 [address] [facility] [address] 
[phone] Patient: Sudeep Peña MRN: [id number] JOANIE:[id number] About your hospitalization You were admitted on:  March 12, 2018 You last received care in the:  THE [facility] [address] You were discharged on:  March 13, 2018 Why you were hospitalized Your primary diagnosis was:  Not on File Your diagnoses also included:  Osteoarthritis Of Right Knee Follow-up Information Follow up With Details Comments Contact Info Wendi Triana MD On 3/28/2018 Follow up appointment @ 11:20am [address] [facility] [address] [facility] 
[phone] Zehra Pompa MD   [address] [facility] [address] [facility] 
[phone] Discharge Orders None A check jeb indicates which time of day the medication should be taken. My Medications START taking these medications Instructions Each Dose to Equal  
 Morning Noon Evening Bedtime  
 aspirin 325 mg tablet Commonly known as:  ASPIRIN Your last dose was: Your next dose is: Take 1 Tab by mouth two (2) times a day. 325 mg  
    
   
   
   
  
 oxyCODONE-acetaminophen 5-325 mg per tablet Commonly known as:  PERCOCET Your last dose was: Your next dose is: Take 1-2 Tabs by mouth every four (4) hours as needed. Max Daily Amount: 12 Tabs. 1-2 Tab CHANGE how you take these medications Instructions Each Dose to Equal  
 Morning Noon Evening Bedtime  
 cevimeline 30 mg capsule Commonly known as:  [facility] What changed:   
- how much to take - when to take this 
- additional instructions Your last dose was: Your next dose is:    
   
   
 take 1 capsule by mouth three times a day  
     
   
   
   
  
 pantoprazole 40 mg tablet Commonly known as:  PROTONIX What changed: - when to take this 
- additional instructions Your last dose was: Your next dose is:    
   
   
 take 1 tablet by mouth once daily STOP taking these medications CALCIUM WITH VITAMIN D PO  
   
  
 multivitamin tablet Commonly known as:  ONE A DAY TYLENOL ARTHRITIS PAIN 650 mg Radha Barajas Generic drug:  acetaminophen TYLENOL PM PO Where to Get Your Medications Information on where to get these meds will be given to you by the nurse or doctor. ! Ask your nurse or doctor about these medications  
  aspirin 325 mg tablet  
 oxyCODONE-acetaminophen 5-325 mg per tablet Discharge Instructions Total Knee Arthroplasty Discharge Instructions Dr. Bonna Habermann Please take the time to review the following instructions before you leave the hospital and use them as guidelines during your recovery from surgery. If you have any questions you may contact my office at [phone]. Wound Care/Dressing Changes: You may change your dressing as needed. Beginning the 2 days after you are discharged from the hospital you should change your dressing daily. A big, bulky dressing isn't necessary as long as there isn't any drainage from the incisions. You can put a band-aid or Mepilex dressing over the incision and wear JEFFERY hose as needed for comfort and swelling. No dressing is necessary if there is no drainage. It isn't necessary to apply antibiotic ointment to your incisions. Prineo tape will peel off in approximately 2-6 weeks. It does not need to be removed prior to that. When it begins to peel off you can cut the edges away with scissors. Showering/Bathing: 
 
[x]   You may shower 2 days after surgery. Your dressing may be removed for showering. You may get your incisions wet in the shower. Don't vigorously scrub the area where your incisions are.   Apply a clean, dry dressing after drying off the area of your incisions. Don't take a tub bath, get in a swimming pool or Jacuzzi until the incisions are completely healed, which is about 14 days. Do not soak your incisions under water. Weight Bearing Status/Activity: 
       
You may walk as tolerated and perform your normal daily activities. Use a walker or a  
cane only if you need them. You should strive to achieve full range of motion in  
your knee as tolerated. We would like for you to return to your normal activities as soon  
as possible. Ice/Elevation: 
 
Continue ice and elevation as needed for pain and swelling. Diet: 
 
Resume your prehospital diet. If you have excessive nausea or vomitting call your doctor's office. Medications: 
 
 
 
1. You will be given a prescription for pain medications when you are discharged from the hospital.  Take the medication as needed according to the directions on the prescription bottle. Possible side effects of the medication include dizziness, headache, nausea, vomiting, constipation and urinary retention. If you experience any of these side effects call the office so that we can assist you in relieving them. Discontinue the use of the pain medication if you develop itching, rash, shortness of breath or difficulties swallowing. If these symptoms become severe or aren't relieved by discontinuing the medication you should seek immediate medical attention. Refills of pain medication are authorized during office hours only. (8AM - 5PM Mon thru Fri) 2. If you were prescribed Percocet/oxycodone or Dilaudid/hydromorphone you must have a written prescription. These medications legally CANNOT be called in to a pharmacy. 3. Do not take Tylenol in addition to your pain medication as most of the pain medication already contains Tylenol. Do not exceed 4000 mg of Tylenol per day. Ex:  (hydrocodon 5/500mg = 500 mg of Tylenol) 4. You may resume the medication you were taking prior to your surgery. Pain medication may change the effects of any antidepressant medication. If you have any questions about possible interactions between your regular medications and the pain medication you should consult the physician who prescribes your regular medications. Stool Softeners:   
Pain medications can cause constipation. Stool softeners, warm prune juice and increasing your water and fiber intake can help prevent constipation. Do not take laxatives. Blood Thinner: You will be prescribed aspirin 325mg to take twice daily for one month following surgery to prevent blood clots. Home Health: 
Begin In-Home Physical Therapy; 3 times a week to work on gait training, range of motion, strengthening, and weight bearing exercises as tolerable. Home health has been arranged for skilled nursing visits and physical therapy. If no one from the agency calls you on the day after you arrive home, please contact them at the number provided at discharge. Physical Therapy for gait training, joint range of motion and strengthening. Follow Up Appointment:  
 
Please call [phone] for a follow appointment with Dr. Eastman Come in 10-14 days from the time of your surgery. Please let our office know you are scheduling a post-op appointment. Signs and Symptoms to be Aware of: If any of the following signs and symptoms occur, you should contact Dr. Gogo Hernandez office. Please be advised if a problem arises which you feel requires immediate medical attention or you are unable to contact Dr. Gogo Hernandez office you should seek immediate medical attention at the emergency department or other health care facility you have access to. Signs and symptoms to watch for include: 1. A sudden increase in swelling and l or redness or warmth at the area your surgery was performed which isn't relieved by rest, ice and elevation. 2 Oral temperature greater than 101.5 degrees for 12 hours or more which isn't relieved by an increase in fluid intake and taking two Tylenol every 4-6 hours. 3 Excessive drainage from your incisions, or drainage which hasn't stopped by 72 hours after your surgery despite applying a compressive dressing, ice and elevation. 4 Calfpain, tenderness, redness or swelling which isn't relieved with rest and elevation. 5 Fever, chills, shortness ofbreath, chest pain, nausea, vomiting or other signs and symptoms which are of concern to you. Other Instructions: Aloom[employer] Announcement We are excited to announce that we are making your provider's discharge notes available to you in Aloompa. You will see these notes when they are completed and signed by the physician that discharged you from your recent hospital stay. If you have any questions or concerns about any information you see in Aloompa, please call the Health Information Department where you were seen or reach out to your Primary Care Provider for more information about your plan of care. Introducing [facility]! Dear Alex Abarca: Thank you for requesting a Aloompa account. Our records indicate that you already have an active Aloompa account. You can access your account anytime at [email or web address] Did you know that you can access your hospital and ER discharge instructions at any time in Aloompa? You can also review all of your test results from your hospital stay or ER visit. Additional Information If you have questions, please visit the Frequently Asked Questions section of the Aloompa website at [email or web address] Remember, Aloompa is NOT to be used for urgent needs. For medical emergencies, dial 911. Now available from your iPhone and Android! Providers Seen During Your Hospitalization Provider Specialty Primary office phone Munir Penn MD Orthopedic Surgery [phone] Immunizations Administered for This Admission Name Date Influenza Vaccine (Quad) PF 3/13/2018 Your Primary Care Physician (PCP) Primary Care Physician Office Phone Office Fax Rachel Hernandez [phone] You are allergic to the following Allergen Reactions Benazepril Cough Chantix (Varenicline) Nausea and Vomiting Codeine Nausea and Vomiting Other (comments) Abdominal Pain Crestor (Rosuvastatin) Myalgia Lipitor (Atorvastatin) Myalgia Pravastatin Myalgia Prevacid (Lansoprazole) Other (comments) Constipation Zocor (Simvastatin) Myalgia Recent Documentation Height Weight BMI OB Status Smoking Status 1.651 m 90.3 kg 33.13 kg/m2 Postmenopausal Former Smoker Emergency Contacts Name Discharge Info Relation Home Work Mobile Ibis Olsen DISCHARGE CAREGIVER [3] Parent [1]   [phone] Patient Belongings The following personal items are in your possession at time of discharge: 
  Dental Appliances: None  Visual Aid: Glasses, With patient      Home Medications: None   Jewelry: None  Clothing: Footwear, Jacket/Coat, Pants, Shirt, Socks, Undergarments (given to mom and sister)    Other Valuables: Eyeglasses (with mom) Please provide this summary of care documentation to your next provider. Signatures-by signing, you are acknowledging that this After Visit Summary has been reviewed with you and you have received a copy. Patient Signature:  ____________________________________________________________ Date:  ____________________________________________________________  
  
Dez Brady Provider Signature:  ____________________________________________________________ Date:  ____________________________________________________________

## 2018-03-12 NOTE — PROGRESS NOTES
Problem: Falls - Risk of  Goal: *Absence of Falls  Document Otoniel Fall Risk and appropriate interventions in the flowsheet.    Outcome: Progressing Towards Goal  Fall Risk Interventions:  Mobility Interventions: Utilize walker, cane, or other assitive device, Patient to call before getting OOB         Medication Interventions: Teach patient to arise slowly, Patient to call before getting OOB    Elimination Interventions: Call light in reach, Patient to call for help with toileting needs, Toileting schedule/hourly rounds

## 2018-03-12 NOTE — PERIOP NOTES
Verbal hand off at the bedside with Kelsey Masterson RN provided opportunity for questions, family waiting in waiting room.

## 2018-03-12 NOTE — ANESTHESIA PROCEDURE NOTES
Peripheral Block    Start time: 3/12/2018 12:04 PM  End time: 3/12/2018 12:11 PM  Performed by: Jason Garza  Authorized by: Jason Garza       Pre-procedure: Indications: at surgeon's request and primary anesthetic    Preanesthetic Checklist: patient identified, risks and benefits discussed, site marked, timeout performed, anesthesia consent given and patient being monitored    Timeout Time: 12:04          Block Type:   Block Type: Adductor canal  Laterality:  Right  Monitoring:  Standard ASA monitoring, responsive to questions, continuous pulse ox, oxygen, frequent vital sign checks and heart rate  Injection Technique:  Single shot  Procedures: ultrasound guided    Patient Position: supine  Prep: chlorhexidine    Location:  Mid thigh  Needle Type:  Stimuplex  Needle Gauge:  21 G  Needle Localization:  Ultrasound guidance  Medication Injected:  0.5%  mepivacaine  Volume (mL):  20  Add'l Medication Injected:  1.5%  mepivacaine  Volume (mL):  10    Assessment:  Number of attempts:  1  Injection Assessment:  Incremental injection every 5 mL, negative aspiration for CSF, no paresthesia, ultrasound image on chart, local visualized surrounding nerve on ultrasound, negative aspiration for blood and no intravascular symptoms  Patient tolerance:  Patient tolerated the procedure well with no immediate complications  Procedure and alternatives explained. Risks explained including bleeding, infection, nerve injury, failed block. Procedure explained as elective. Pt understands and desires to proceed. Patient able to straight leg raise  right leg after block. No sensory or motor block.

## 2018-03-12 NOTE — OP NOTES
[address] [facility] [address] Medicine  Total Right Hip Arthroplasty      Patient: Con Gosselin MRN: [id number]  SSN: [id number]    YOB: 1960  Age: 61 y.o. Sex: female      Date of Surgery: 3/12/2018   Preoperative Diagnosis: UNILATERAL PRIMARY OSTEOARTHRITIS, RIGHT KNEE   Postoperative Diagnosis: UNILATERAL PRIMARY OSTEOARTHRITIS, RIGHT KNEE   Location: [facility]  Surgeon: Tiffany Serrano MD  Assistant:    Audra ASHLEY    Anesthesia: General     Procedure: Total Right Hip Arthroplasty    Findings:  Degenerative joint disease of the right hip. Estimated Blood Loss: 450    Specimens: None    Implants:   Implant Name Type Inv. Item Serial No.  Lot No. LRB No. Used Action   BASEPLT TIB PC TRITNM SZ 5 -- TRIATHLON - [id number]  BASEPLT TIB PC TRITNM SZ 5 -- TRIATHLON  [employer] HOW [id number] Right 1 Implanted   INSERT TIB ARTC BRNG SZ5 9MM -- TRIATHLON X3 - [id number]  INSERT TIB ARTC BRNG SZ5 9MM -- TRIATHLON X3  [employer] [facility] [id number] Right 1 Implanted   COMPNT FEM CR TRIATHLN 5 R PA --  - [id number]  COMPNT FEM CR TRIATHLN 5 R PA --   [employer] HOW [id number] Right 1 Implanted   PAT ASYM MTL-BK 10MM SZ A35 -- TRIATHLON - [id number]   PAT ASYM MTL-BK 10MM SZ A35 -- TRIATHLON   [employer] HOW [id number] Right 1 Implanted       Procedure Detail:  After the patient was brought to the operating suite, She was effectively anesthetized using general anesthesia, then transferred to the [facility] table and secured in a standard fashion. Her right hip was then prepped with Chloroprep and draped in a normal sterile orthopedic fashion. She was given appropriate intravenous antibiotic preoperatively.  After proper site indication was performed, a direct anterior approach to the hip was performed using a short Salcedo-Root interval. The incision was placed approximately 3 cm lateral to the anterior superior iliac spine and progressed distally and laterally for a length of approximately 4 inches. Incision was made through the Tensor Fascia Nargis with the knife and continued with the Winter scissors. An Allis clamp was placed on the medial border of the Tensor Fascia Nargis and disection continued on the medial border of Tensor Fascia Nargis Muscle. Dissection was continued down to the lateral hip capsule. A Cobra retractor was placed on the lateral hip capsule. A Mayra Retractor was placed distally and a second Cobra retractor was placed on the medial hip capsule. The Lateral Femoral Circumflex Vessels were identified clamped and cauterized. Anterior capsulotomy was performed. Portions of the capsule were removed. The Cobra retractors were then placed on the femoral neck. The degenerative changes of the hip were noted. Femoral neck osteotomy was then performed. The head and neck were removed. The neck length was confirmed with the image intensification. The labrum was excised. The acetabulum was then reamed up to 52 mm with good bleeding bone in all quadrants obtained. The cup was then irrigated with pulse lavage system. A 52 mm Exactech Cluster hole cup was then impacted in place with excellent stable fixation obtained, placing the cup at about 45 degrees of abduction, 20 degrees of anteversion. one screw placed. The 36 mm inner diameter polyethylene liner was then impacted in place. It was stable after impacting and could not be disloged. Attention was turned to the femur, which was delivered into the wound with a combination of extension, external rotation, and adduction, and using the hook on the McDermitt table to deliver the femur into the wound. The box osteotome was used followed by the canal finder and the lateralizing broach. The canal was broached up to a size 8. A trial reduction was then performed with the standard neck offset and negative 3.5 mm head trial. This was evaluated for leg length and canal fill. The trial broach was removed.  Broaching then proceeded up to a size 10. This broach was removed. The canal was irrigated with the pulse lavage system. The size 10 stem was impacted into position with excellent stability being obtained. The 0x36 ceramic head was impacted into position after drying the arthur taper with a sponge. The final reduction was performed and once again leg lengths and offset were restored radiographically, using the C-arm Excellent functional stability was noted. Final irrigation was done at this time. The wound was closed in layers. The tensor fascia janet was closed with #1 Vicryl in a running type stitch. Subcutaneous tissue was closed with 2-0 Vicryl in a simple buried stitch, and the skin was closed with a subcuticular 3-0 monocryl followed by the Prineo system. Mepilex dressing was then applied. She tolerated this well, was transferred to the recovery room bed, and taken to recovery room in stable condition. All sponge and needle counts were correct.     Signed By: Desi Costa MD     March 12, 2018

## 2018-03-12 NOTE — PROGRESS NOTES
19:55 Assessment completed. Lungs are clear bilat. but are slightly decreased in the bases. Ace wrap on RLE remains C/D/I with + CMS & + PP. Denies pain or discomfort in calves. Ice packs were refilled & re-applied. Resting quietly in bed x for voiding per BR w/o difficulty. 22:20 Shift assessment completed. See ns flow sheet for details. 03:00 Reassessed with 0 changes noted. Ace wrap on RLE remains C/D/I with + CMS & + PP. Continues to deny pain or discomfort in calves. Ice packs were refilled & re-applied. Resting quietly in bed with eyes closed between cares x for voiding per BR w/o difficulty. 07:00 Up in the chair @ bedside with phone & call bell within reach. 07:20 Bedside and Verbal shift change report given to Maddie Delaney RN (oncoming nurse) by Florentino Yaadv RN (offgoing nurse). Report included the following information SBAR.

## 2018-03-12 NOTE — PROGRESS NOTES
1600 - Patient arrives to unit at this time. Admission assessment completed. Patient is A/O x 4. Lungs clear, radial pulses present , pedal pulses present , abdomen soft and non-distended. Bowel sounds active, 18 G IV to left hand  intact and patent infusing. No signs of phlebitis or infiltration noted. JEFFERY hose to LLE and plexis applied bilaterally. Skin warm and dry  with  ACE dressing to right knee CDI. Patient denies numbness/tingling. Pain 2/10. Patient was oriented to the room to include use of call bell, meal ordering, and use of incentive spirometer. Patient was given explanation of \" up for dinner\" program and has verbalized understanding. Phone and call bell left within reach. Plan of care for the day addressed with patient. Educated on pain medication availability and possible side effects. 1722- PRN Zofran 4 mg IV given at this time for nausea    1750- Patient walked out in hallway with PT at this time    1830- Pain 7/10. PRN Percocet 10 mg PO pain medication administered at this time. Patient has been educated on side effects. Side effect education sheets have been provided.

## 2018-03-13 ENCOUNTER — HOME HEALTH ADMISSION (OUTPATIENT)
Dept: HOME HEALTH SERVICES | Facility: HOME HEALTH | Age: 58
End: 2018-03-13
Payer: COMMERCIAL

## 2018-03-13 VITALS
OXYGEN SATURATION: 97 % | TEMPERATURE: 98.7 F | HEIGHT: 65 IN | RESPIRATION RATE: 16 BRPM | HEART RATE: 63 BPM | DIASTOLIC BLOOD PRESSURE: 70 MMHG | BODY MASS INDEX: 33.17 KG/M2 | SYSTOLIC BLOOD PRESSURE: 145 MMHG | WEIGHT: 199.08 LBS

## 2018-03-13 LAB
ANION GAP SERPL CALC-SCNC: 5 MMOL/L (ref 3–18)
BUN SERPL-MCNC: 13 MG/DL (ref 7–18)
BUN/CREAT SERPL: 16 (ref 12–20)
CALCIUM SERPL-MCNC: 8.5 MG/DL (ref 8.5–10.1)
CHLORIDE SERPL-SCNC: 106 MMOL/L (ref 100–108)
CO2 SERPL-SCNC: 30 MMOL/L (ref 21–32)
CREAT SERPL-MCNC: 0.8 MG/DL (ref 0.6–1.3)
GLUCOSE SERPL-MCNC: 105 MG/DL (ref 74–99)
HCT VFR BLD AUTO: 35 % (ref 35–45)
HGB BLD-MCNC: 10.9 G/DL (ref 12–16)
POTASSIUM SERPL-SCNC: 4 MMOL/L (ref 3.5–5.5)
SODIUM SERPL-SCNC: 141 MMOL/L (ref 136–145)

## 2018-03-13 PROCEDURE — 74011250636 HC RX REV CODE- 250/636: Performed by: PHYSICIAN ASSISTANT

## 2018-03-13 PROCEDURE — 74011250636 HC RX REV CODE- 250/636: Performed by: ORTHOPAEDIC SURGERY

## 2018-03-13 PROCEDURE — 97535 SELF CARE MNGMENT TRAINING: CPT

## 2018-03-13 PROCEDURE — 80048 BASIC METABOLIC PNL TOTAL CA: CPT | Performed by: PHYSICIAN ASSISTANT

## 2018-03-13 PROCEDURE — 90471 IMMUNIZATION ADMIN: CPT

## 2018-03-13 PROCEDURE — 97110 THERAPEUTIC EXERCISES: CPT

## 2018-03-13 PROCEDURE — 90686 IIV4 VACC NO PRSV 0.5 ML IM: CPT | Performed by: ORTHOPAEDIC SURGERY

## 2018-03-13 PROCEDURE — 74011250637 HC RX REV CODE- 250/637: Performed by: PHYSICIAN ASSISTANT

## 2018-03-13 PROCEDURE — 36415 COLL VENOUS BLD VENIPUNCTURE: CPT | Performed by: PHYSICIAN ASSISTANT

## 2018-03-13 PROCEDURE — 97116 GAIT TRAINING THERAPY: CPT

## 2018-03-13 PROCEDURE — 85018 HEMOGLOBIN: CPT | Performed by: PHYSICIAN ASSISTANT

## 2018-03-13 PROCEDURE — 97165 OT EVAL LOW COMPLEX 30 MIN: CPT

## 2018-03-13 RX ORDER — OXYCODONE HYDROCHLORIDE 5 MG/1
10 TABLET ORAL
Status: COMPLETED | OUTPATIENT
Start: 2018-03-13 | End: 2018-03-13

## 2018-03-13 RX ORDER — OXYCODONE AND ACETAMINOPHEN 5; 325 MG/1; MG/1
1-2 TABLET ORAL
Qty: 60 TAB | Refills: 0 | Status: SHIPPED | OUTPATIENT
Start: 2018-03-13 | End: 2018-06-22

## 2018-03-13 RX ORDER — ASPIRIN 325 MG
325 TABLET ORAL 2 TIMES DAILY
Qty: 60 TAB | Refills: 0 | Status: SHIPPED | OUTPATIENT
Start: 2018-03-13 | End: 2018-06-22

## 2018-03-13 RX ADMIN — SODIUM CHLORIDE, SODIUM LACTATE, POTASSIUM CHLORIDE, AND CALCIUM CHLORIDE 75 ML/HR: 600; 310; 30; 20 INJECTION, SOLUTION INTRAVENOUS at 02:07

## 2018-03-13 RX ADMIN — CEFAZOLIN SODIUM 2 G: 2 SOLUTION INTRAVENOUS at 03:01

## 2018-03-13 RX ADMIN — FERROUS SULFATE TAB 325 MG (65 MG ELEMENTAL FE) 325 MG: 325 (65 FE) TAB at 08:04

## 2018-03-13 RX ADMIN — ASPIRIN 325 MG ORAL TABLET 325 MG: 325 PILL ORAL at 08:04

## 2018-03-13 RX ADMIN — INFLUENZA VIRUS VACCINE 0.5 ML: 15; 15; 15; 15 SUSPENSION INTRAMUSCULAR at 07:49

## 2018-03-13 RX ADMIN — OXYCODONE HYDROCHLORIDE AND ACETAMINOPHEN 2 TABLET: 5; 325 TABLET ORAL at 03:01

## 2018-03-13 RX ADMIN — OXYCODONE HYDROCHLORIDE AND ACETAMINOPHEN 2 TABLET: 5; 325 TABLET ORAL at 07:30

## 2018-03-13 RX ADMIN — OXYCODONE HYDROCHLORIDE 10 MG: 5 TABLET ORAL at 12:36

## 2018-03-13 RX ADMIN — PANTOPRAZOLE SODIUM 40 MG: 40 TABLET, DELAYED RELEASE ORAL at 08:04

## 2018-03-13 RX ADMIN — DOCUSATE SODIUM 100 MG: 100 CAPSULE, LIQUID FILLED ORAL at 08:03

## 2018-03-13 NOTE — DISCHARGE INSTRUCTIONS
Total Knee Arthroplasty Discharge Instructions           Dr. Jennifer Hogan    Please take the time to review the following instructions before you leave the hospital and use them as guidelines during your recovery from surgery. If you have any questions you may contact my office at [phone]. Wound Care/Dressing Changes: You may change your dressing as needed. Beginning the 2 days after you are discharged from the hospital you should change your dressing daily. A big, bulky dressing isn't necessary as long as there isn't any drainage from the incisions. You can put a band-aid or Mepilex dressing over the incision and wear JEFFERY hose as needed for comfort and swelling. No dressing is necessary if there is no drainage. It isn't necessary to apply antibiotic ointment to your incisions. Prineo tape will peel off in approximately 2-6 weeks. It does not need to be removed prior to that. When it begins to peel off you can cut the edges away with scissors. Showering/Bathing:    [x]   You may shower 2 days after surgery. Your dressing may be removed for showering. You may get your incisions wet in the shower. Don't vigorously scrub the area where your incisions are. Apply a clean, dry dressing after drying off the area of your incisions. Don't take a tub bath, get in a swimming pool or Jacuzzi until the incisions are completely healed, which is about 14 days. Do not soak your incisions under water. Weight Bearing Status/Activity:          You may walk as tolerated and perform your normal daily activities. Use a walker or a   cane only if you need them. You should strive to achieve full range of motion in   your knee as tolerated. We would like for you to return to your normal activities as soon   as possible. Ice/Elevation:    Continue ice and elevation as needed for pain and swelling. Diet:    Resume your prehospital diet.  If you have excessive nausea or vomitting call your doctor's office. Medications:        1. You will be given a prescription for pain medications when you are discharged from the hospital.  Take the medication as needed according to the directions on the prescription bottle. Possible side effects of the medication include dizziness, headache, nausea, vomiting, constipation and urinary retention. If you experience any of these side effects call the office so that we can assist you in relieving them. Discontinue the use of the pain medication if you develop itching, rash, shortness of breath or difficulties swallowing. If these symptoms become severe or aren't relieved by discontinuing the medication you should seek immediate medical attention. Refills of pain medication are authorized during office hours only. (8AM - 5PM Mon thru Fri)  2. If you were prescribed Percocet/oxycodone or Dilaudid/hydromorphone you must have a written prescription. These medications legally CANNOT be called in to a pharmacy. 3. Do not take Tylenol in addition to your pain medication as most of the pain medication already contains Tylenol. Do not exceed 4000 mg of Tylenol per day. Ex:  (hydrocodon 5/500mg = 500 mg of Tylenol)  4. You may resume the medication you were taking prior to your surgery. Pain medication may change the effects of any antidepressant medication. If you have any questions about possible interactions between your regular medications and the pain medication you should consult the physician who prescribes your regular medications. Stool Softeners:    Pain medications can cause constipation. Stool softeners, warm prune juice and increasing your water and fiber intake can help prevent constipation. Do not take laxatives. Blood Thinner: You will be prescribed aspirin 325mg to take twice daily for one month following surgery to prevent blood clots.      Home Health:  Begin In-Home Physical Therapy; 3 times a week to work on gait training, range of motion, strengthening, and weight bearing exercises as tolerable. Home health has been arranged for skilled nursing visits and physical therapy. If no one from the agency calls you on the day after you arrive home, please contact them at the number provided at discharge. Physical Therapy for gait training, joint range of motion and strengthening. Follow Up Appointment:     Please call [phone] for a follow appointment with Dr. Bonna Habermann in 10-14 days from the time of your surgery. Please let our office know you are scheduling a post-op appointment. Signs and Symptoms to be Aware of: If any of the following signs and symptoms occur, you should contact Dr. Priyanka Short office. Please be advised if a problem arises which you feel requires immediate medical attention or you are unable to contact Dr. Priyanka Short office you should seek immediate medical attention at the emergency department or other health care facility you have access to. Signs and symptoms to watch for include:     1. A sudden increase in swelling and l or redness or warmth at the area your surgery was performed which isn't relieved by rest, ice and elevation. 2 Oral temperature greater than 101.5 degrees for 12 hours or more which isn't relieved by an increase in fluid intake and taking two Tylenol every 4-6 hours. 3 Excessive drainage from your incisions, or drainage which hasn't stopped by 72 hours after your surgery despite applying a compressive dressing, ice and elevation. 4 Calfpain, tenderness, redness or swelling which isn't relieved with rest and elevation. 5 Fever, chills, shortness ofbreath, chest pain, nausea, vomiting or other signs and symptoms which are of concern to you.      Other Instructions:

## 2018-03-13 NOTE — PROGRESS NOTES
Problem: Falls - Risk of  Goal: *Absence of Falls  Document Otoniel Fall Risk and appropriate interventions in the flowsheet.    Outcome: Progressing Towards Goal  Fall Risk Interventions:  Mobility Interventions: Patient to call before getting OOB, Utilize walker, cane, or other assitive device         Medication Interventions: Assess postural VS orthostatic hypotension, Patient to call before getting OOB, Teach patient to arise slowly    Elimination Interventions: Call light in reach, Patient to call for help with toileting needs

## 2018-03-13 NOTE — PROGRESS NOTES
Chart reviewed, met with pt at bedside. Pt plans discharge home, has someone staying with her once home. FOC offered, pt chose [facility] [phone] for follow up; referral placed with CMS. RW has been delivered to pt room by [facility]. Care Management Interventions  PCP Verified by CM:  Yes  Transition of Care Consult (CM Consult): [address]: Yes  Discharge Durable Medical Equipment: Yes  Physical Therapy Consult: Yes  Occupational Therapy Consult: Yes  Current Support Network: Own Home  Confirm Follow Up Transport: Friends  Plan discussed with Pt/Family/Caregiver: Yes  Freedom of Choice Offered: Yes  Discharge Location  Discharge Placement: Home with home health

## 2018-03-13 NOTE — PROGRESS NOTES
Progress Note     Patient: Keturah Correa MRN: [id number]  SSN: [id number]    YOB: 1960  Age: 61 y.o. Sex: female      POD:    1 Day Post-Op  S/P:    Procedure(s):  RIGHT TOTAL KNEE REPLACMENT     Subjective:   Pt is without complaints of pain. Objective:     Patient Vitals for the past 12 hrs:   BP Temp Pulse Resp SpO2 Weight   03/13/18 0315 144/77 97.7 °F (36.5 °C) 60 16 97 % -   03/13/18 0135 - - - - - 90.3 kg (199 lb 1.2 oz)   03/13/18 0020 121/76 98.1 °F (36.7 °C) 63 16 93 % -   03/12/18 2017 151/86 97.6 °F (36.4 °C) 77 14 92 % -     No results for input(s): HGB, HCT, INR, NA, K, CL, CO2, BUN, CREA, GLU, HGBEXT, HCTEXT in the last 72 hours. No lab exists for component: INREXT    Pt resting in bed. Lower extremity operative dressing clean/dry/intact. Neurovascularly intact. Calves soft, nontender. Assessment:     Awake and alert. In good spirits. X rays satisfactory. Walked with PT yest.  Probable DC today. Plan:   1. Continue pain management/ ice to operative area. 2. Continue to progress with PT/OT. 3. Discharge planning to home with home health.

## 2018-03-13 NOTE — DISCHARGE SUMMARY
Patient: Madina Patton               Sex: female         MRN: [id number]       YOB: 1960      Age:  61 y.o.        LOS:  LOS: 1 day                DOA: 3/12/2018    Discharge Date: 3/13/2018    Admission Diagnoses: UNILATERAL PRIMARY OSTEOARTHRITIS, RIGHT KNEE  Osteoarthritis of right knee    Discharge Diagnoses:    Problem List as of 3/13/2018  Date Reviewed: 3/13/2018          Codes Class Noted - Resolved    Osteoarthritis of right knee ICD-10-CM: M17.11  ICD-9-CM: 715.96  3/12/2018 - Present        Obesity (BMI 30.0-34.9) ICD-10-CM: E66.9  ICD-9-CM: 278.00  12/12/2016 - Present        Allergic rhinitis ICD-10-CM: J30.9  ICD-9-CM: 477.9  8/4/2016 - Present        Arthritis, degenerative knees Dr Olivia Dinero: M19.90  ICD-9-CM: 715.90  6/30/2015 - Present        Gastroesophageal reflux disease without esophagitis ICD-10-CM: K21.9  ICD-9-CM: 530.81  6/30/2015 - Present        Essential hypertension ICD-10-CM: I10  ICD-9-CM: 401.9  6/28/2015 - Present        Sjogren's disease, probable Dr. Devlin Marin: M35.00  ICD-9-CM: 710.2  8/8/2012 - Present        Colon polyps Dr. Tamika Jones 2011, adenoma 8/16 ICD-10-CM: K63.5  ICD-9-CM: 211.3  10/8/2011 - Present        Hyperlipidemia ICD-10-CM: E78.5  ICD-9-CM: 272.4  Unknown - Present              This is a 61 y.o. female with a  history of ongoing knee pain secondary to degenerative joint disease. The patient has failed to respond to conservative care. The option of a total knee arthroplasty was discussed with the patient. Risks and benefits of the procedure were explained to the patient as well as other treatment options and possible surgical outcomes. The patient acknowledged and consent was obtained. The patient was therefore scheduled to undergo a right total knee arthroplasty with Dr. Taryn Williamson. The patient was taken to the operating room for the above-stated procedure. IV antibiotics were given prior to the incision.  SCDs were used for DVT prophylaxis. The patient had an estimated intraoperative blood loss of 150 mL. The patient tolerated the procedure well without any complications, and was taken to the recovery room in stable condition. The patient was then transferred to the postoperative orthopedic floor for convalescence, PT, pain management, as well as discharge planning. Physical therapy and occupational therapy initiated their evaluation and treatment and continued to follow the patient until the patient was discharged. For pain control, a femoral nerve block was used for a bolus the day of surgery and then removed. Exparel was injected intraoperatively as well for post-op pain management. The patient then was transitioned over to oral narcotics in which was well tolerated. DVT prophylaxis was initiated on the day of surgery including; aspirin, compression stockings and bilateral foot pumps. At the time of discharge, the patient was able to ambulate safely, go up and down stairs and had an understanding of the explicit discharge precautions and instructions following surgery. Home Health will come out to assist the patient with this. The patient was discharged to follow up with Dr. Dara Bueno in approximately 10 to 14 days. Discharge Condition: Good  DISPOSITION: To home. On the day of discharge the patient was afebrile. Vital signs were stable. The patient was in no acute distress. her Right knee incision was clean, dry, and intact. Extremity was warm and well-perfused, distally neurovascularly intact. DISCHARGE INSTRUCTIONS:    The patient will be discharged home on aspirin 325mg BID x 1 month for DVT prophylaxis. Continue physical therapy for range of motion, gait training and strengthening. Continue therapeutic exercises. Follow up in 10 to 14 days with Dr. Dara Bueno.       DISCHARGE MEDICATIONS:   Current Discharge Medication List      START taking these medications    Details   aspirin (ASPIRIN) 325 mg tablet Take 1 Tab by mouth two (2) times a day. Qty: 60 Tab, Refills: 0      oxyCODONE-acetaminophen (PERCOCET) 5-325 mg per tablet Take 1-2 Tabs by mouth every four (4) hours as needed. Max Daily Amount: 12 Tabs.   Qty: 60 Tab, Refills: 0    Associated Diagnoses: Primary osteoarthritis of right knee         CONTINUE these medications which have NOT CHANGED    Details   pantoprazole (PROTONIX) 40 mg tablet take 1 tablet by mouth once daily  Qty: 90 Tab, Refills: 3    Associated Diagnoses: Gastroesophageal reflux disease without esophagitis      cevimeline (EVOXAC) 30 mg capsule take 1 capsule by mouth three times a day  Qty: 270 Cap, Refills: 3    Associated Diagnoses: Sjogren's disease ([address]         STOP taking these medications       ACETAMINOPHEN/DIPHENHYDRAMINE (TYLENOL PM PO) Comments:   Reason for Stopping:         acetaminophen (TYLENOL ARTHRITIS PAIN) 650 mg TbER Comments:   Reason for Stopping:         multivitamin (ONE A DAY) tablet Comments:   Reason for Stopping:         CALCIUM CARBONATE/VITAMIN D3 (CALCIUM WITH VITAMIN D PO) Comments:   Reason for Stopping:

## 2018-03-13 NOTE — PROGRESS NOTES
4658 - Bedside report received from Melisa Shipley. Patient up in chair at this time. Pain 8/10. Plan of care for the day addressed with the patient. 0730- Pain 8/10. PRN Oxycodone 2, 5-325 tabs pain medication administered at this time. Patient has been educated on side effects. 0803- Medications administered and education regarding potential side effects given. 0830- Pain Reassessment 4/10.    0751- Patient walked to restroom with walker, did her own ADL care, Flu shot given left deltoid, patient stated having flu shot on prior occasions and no adverse effects reported. Patient given hand out regarding flu shot and possible side effects. 2470- Medications administered while patient was seated at bedside. 0900- Patient sitting in chair, patient said she felt light headed suddenly. Patient ate 30% of her breakfast and said she did not have much of an appetite. Patient denied shortness of breath and said her pain was currently 3/10. Assessment completed. Vitals taken 127/68, 16 R, 54 P, 95%, 99.1 T. Patient encouraged to make sure she was using the incentive spirometer at least 10 times and hour. 1030- Ace bandage removed from right leg, mepilex dressing, clean, dry and intact. 1130- Pain reassessment, patient said she was having pain 9/10. Maximum dosage of Acetaminophen reached, Doctor contacted to change order of pain medicine, Maira Ortez RN updated order, see MAR.    1430- Patient seated in chair,alert, IV removed.

## 2018-03-13 NOTE — PROGRESS NOTES
1215- ALONSO Polk contacted to inform patient was at limit for tylenol for the day, asked if pain medication can be changed to just oxycodone instead of percocet, order given for oxycodone 10 mg po now. 1235- Pain 9/10. PRN oxycodone 10 mg PO pain medication administered at this time. Patient has been educated on side effects. Side effect education sheets have been provided. 1435- This writer has reviewed discharge instructions with patient at this time. Patient has verbalized understanding. Patient was provided with care notes to include side effects of RX's. IV removed, patient tolerated well. Arm bands removed and shredded. AVS were reviewed with Davian Black RN.

## 2018-03-13 NOTE — PROGRESS NOTES
Problem: Mobility Impaired (Adult and Pediatric)  Goal: *Acute Goals and Plan of Care (Insert Text)  Physical Therapy Goals LT/ST  Initiated 3/12/2018 and to be accomplished within 3-5 day(s)  1. Patient will move from supine <> sit with S in prep for out of bed activity and change of position. 2.  Patient will perform sit<> stand with S with LRAD in prep for transfers/ambulation. 3.  Patient will transfer from bed <> chair with S with LRAD for time up in chair for completion of ADL activity. 4.  Patient will ambulate 150 feet with LRAD/S for improved functional mobility/safe discharge. 5.  Patient will ascend/descend 3-5 stairs with B/L handrail with contact guard assist for home re-entry as needed. Outcome: Resolved/Met Date Met: 03/13/18  physical Therapy TREATMENT/DISCHARGE    Patient: See Winter (22 y.o. female)  Date: 3/13/2018  Diagnosis: UNILATERAL PRIMARY OSTEOARTHRITIS, RIGHT KNEE  Osteoarthritis of right knee <principal problem not specified>  Procedure(s) (LRB):  RIGHT TOTAL KNEE REPLACMENT (Right) 1 Day Post-Op  Precautions: Fall, WBAT  Chart, physical therapy assessment, plan of care and goals were reviewed. ASSESSMENT:  Patient is cleared for discharge from PT and has met all goals. Patient reported 3/10 pain level on entry. Patient is S with bed mobility and at S level for sit<>supine and sit<>stand. PT ambulated 225' with RW and slow antalgic pattern with good balance . Up/down 5 steps with right rail and SBA. Pt performed TKA ex as documented below and verbalized understanding of home safety information. AROM right knee 12 to 101 degrees. PT left in bed with SCD's in place, ice to right knee, needs in reach. Nurse Nathaniel Patches aware of pt status. Recommend HHPT at discharge. Pt has RW for safe home ambulation.     Progression toward goals:  [x]      Goals met  []      Improving appropriately and progressing toward goals  []      Improving slowly and progressing toward goals  [] Not making progress toward goals and plan of care will be adjusted     PLAN:  Patient will be discharged from physical therapy at this time. Rationale for discharge:  [x] Goals Achieved  [] [address]  [] Patient not participating in therapy  [] Other:  Discharge Recommendations:  Home Health  Further Equipment Recommendations for Discharge:  N/A     SUBJECTIVE:   Patient stated I am doing good.     OBJECTIVE DATA SUMMARY:   Critical Behavior:  Neurologic State: Alert, Appropriate for age  Orientation Level: Oriented X4  Cognition: Appropriate decision making  Safety/Judgement: Awareness of environment, Fall prevention  Functional Mobility Training:  Bed Mobility:  Supine to Sit: Supervision  Sit to Supine: Supervision  Scooting: Supervision  Transfers:  Sit to Stand: Supervision  Stand to Sit: Supervision  Bed to Chair: Supervision  Balance:  Sitting: Intact  Standing: Intact; With support  Ambulation/Gait Training:  Distance (ft): 225 Feet (ft)  Assistive Device: Walker, rolling;Gait belt  Ambulation - Level of Assistance: Supervision  Gait Abnormalities: Antalgic;Decreased step clearance  Right Side Weight Bearing: As tolerated  Base of Support: Shift to left  Stance: Right decreased  Speed/Martha: Slow  Step Length: Right shortened;Left shortened  Swing Pattern: Right symmetrical  Interventions: Visual/Demos; Verbal cues; Safety awareness training    Stairs:  Number of Stairs Trained: 5  Stairs - Level of Assistance: Stand-by assistance   Rail Use: Right     Therapeutic Exercises:   Patient performed 5 reps of TKA exercises per protocol for right lower extremity(s), including supine ankle pumps, ankle circles quad sets, hamstringsets, heel slides, straight leg raises, short arc quads/ terminal knee extension, seated heel slides/ kneeflexion exercises, LAQ.       Pain:  Pain Scale 1: Numeric (0 - 10)  Pain Intensity 1: 3  Pain Location 1: Knee  Pain Orientation 1: Right  Pain Description 1: Aching  Pain Intervention(s) 1: Ice  Activity Tolerance:   good  Please refer to the flowsheet for vital signs taken during this treatment. After treatment:   [x] Patient left in no apparent distress sitting up in chair  [] Patient left in no apparent distress in bed  [x] Call bell left within reach  [x] Nursing notified  [] Caregiver present  [] Bed alarm activated    Antonio Haines, PT   Time Calculation: 30 mins    Mobility  D/C  CI= 1-19%. The severity rating is based on the Level of Assistance required for Functional Mobility and ADLs.

## 2018-03-13 NOTE — PROGRESS NOTES
Problem: Self Care Deficits Care Plan (Adult)  Goal: *Acute Goals and Plan of Care (Insert Text)  Short Term Goals 3/13/18:  Malbettye Guevara OTR/L  In one session:  1. Pt will perform basic self care and functional ADL transfers with modified independence to supervision using AD/DME as needed in order to return home safely alone. Goal met after OT session 3/13/18. Pt reports sister will be staying with her for awhile to give support as needed. Outcome: Resolved/Met Date Met: 03/13/18  Occupational Therapy EVALUATION/discharge    Patient: Apolinar Mujica (10 y.o. female)  Date: 3/13/2018  Primary Diagnosis: UNILATERAL PRIMARY OSTEOARTHRITIS, RIGHT KNEE  Osteoarthritis of right knee  Procedure(s) (LRB):  RIGHT TOTAL KNEE REPLACMENT (Right) 1 Day Post-Op   Precautions:   Fall, WBAT    ASSESSMENT AND RECOMMENDATIONS:  Based on the objective data described below, the patient presents with ability to perform ADL tasks at near baseline level. After OT session today patient was able to perform all basic self care tasks and functional ADL transfers at modified independent to supervision using DME/AD as needed. Pt with good activity tolerance, standing tolerance, and standing balance. Pt reported minimal pain. Pt with no further acute OT/ADL concerns at this time. Skilled occupational therapy is not indicated at this time. Education: fall prevention; balance recovery; safety; compensatory dressing tech    Discharge Recommendations: Home Health  Further Equipment Recommendations for Discharge: rolling walker     SUBJECTIVE:   Patient stated I am doing well.     OBJECTIVE DATA SUMMARY:     Past Medical History:   Diagnosis Date    Allergic rhinitis Dr. Nohemi Stallworth 2011     Colon adenoma 08/2016    Dr Alicia Lopez 2007, 2011, 8/16    DJD (degenerative joint disease)     B knees Dr. Odell Winter FHx: heart disease     GERD (gastroesophageal reflux disease) 2007    [facility]    Hyperlipidemia calculated risk score 1.9% (2/13); elected to take statins due to Fhx, elev hscrp/ldl-p but intol of 4 diff ones    Hypertension     resolved    Obesity (BMI 30.0-34.9)     peak weight 189 lbs, bmi 31.5 from 12/16    Overweight (BMI 25.0-29.9)     saw Dr Edla Moss; qsymia ineffective 6/14-6/15    Sjogren's disease, probable Dr. Davis Nones 8/12     Past Surgical History:   Procedure Laterality Date    HX APPENDECTOMY  1980s    HX BREAST BIOPSY      right    HX CHOLECYSTECTOMY  1980s    HX [address] [facility] 2007, Dr. Jarvis Vail 2011, 8/16 adenoma    HX TUBAL LIGATION       Barriers to Learning/Limitations: None  Compensate with: visual, verbal, tactile, kinesthetic cues/model    G-Codes (GP)  Self Care   Current  CI= 1-19%   Goal  CI= 1-19%   D/C  CI= 1-19%  The severity rating is based on the professional judgement & direct observation of Level of Assistance required for Functional Mobility and ADLs. Eval Complexity: History: LOW Complexity : Brief history review ; Examination: LOW Complexity : 1-3 performance deficits relating to physical, cognitive , or psychosocial skils that result in activity limitations and / or participation restrictions ; Decision Making:LOW Complexity : No comorbidities that affect functional and no verbal or physical assistance needed to complete eval tasks     Prior Level of Function/Home Situation: Pt independent with all basic self care and IADLs. Pt drives. Pt works full time. Home Situation  Home Environment: Private residence  # Steps to Enter: 8  Rails to Enter: Yes (wide)  One/Two Story Residence: Two story, live on 1st floor  Living Alone: Yes  Support Systems: Family member(s), Friends \ neighbors  Patient Expects to be Discharged to[de-identified] Private residence  Current DME Used/Available at Home: Shower chair, Walker, rolling  Tub or Shower Type: Shower  Cognitive/Behavioral Status:  Neurologic State: Alert; Appropriate for age  Orientation Level: Oriented X4  Cognition: Appropriate decision making; Follows commands  Safety/Judgement: Awareness of environment; Fall prevention  Skin: site of incision RLE  Edema: swelling RLE knee to ankle  Coordination:  Fine Motor Skills-Upper: Left Intact; Right Intact    Gross Motor Skills-Upper: Left Intact; Right Intact  Balance:  Sitting: Intact  Standing: Intact; With support  Functional Mobility and Transfers for ADLs:  Transfers:  Sit to Stand: Modified independent  Bed to Chair: Supervision   Toilet Transfer : Supervision     Bathroom Mobility: Supervision/set up  ADL Assessment:  Oral Facial Hygiene/Grooming: Contact guard assistance  Upper Body Dressing: Independent  Lower Body Dressing: Contact guard assistance  Toileting: Contact guard assistance  ADL Intervention:  Grooming  Grooming Assistance: Modified independent (standing at sink)  Washing Face: Modified independent  Washing Hands: Modified independent  Brushing Teeth: Modified independent    Upper Body Dressing Assistance  Pullover Shirt: Independent    Lower Body Dressing Assistance  Underpants: Modified independent  Pants With Elastic Waist: Modified independent  Socks: Modified independent    Toileting  Toileting Assistance: Modified independent  Bladder Hygiene: Modified independent  Clothing Management: Modified independent  Adaptive Equipment: Walker    Cognitive Retraining  Safety/Judgement: Awareness of environment; Fall prevention    Pain:  Pre-treatment: 3/10  Post-treatment: 3/10  Activity Tolerance:   WFLs; standing tolerance 7-10 minutes; minimal pain; able to perform dressing/grooming tasks without rest breaks  Please refer to the flowsheet for vital signs taken during this treatment.   After treatment:   [x]  Patient left in no apparent distress sitting up in chair  []  Patient left in no apparent distress in bed  [x]  Call bell left within reach  [x]  Nursing notified  []  Caregiver present  []  Bed alarm activated    COMMUNICATION/EDUCATION: Communication/Collaboration:  [x]      Home safety education was provided and the patient/caregiver indicated understanding. [x]      Patient/family have participated as able and agree with findings and recommendations. []      Patient is unable to participate in plan of care at this time.     Thank you for this referral.  Collette Mas, OT  Time Calculation: 24 mins

## 2018-03-13 NOTE — PROGRESS NOTES
Problem: Mobility Impaired (Adult and Pediatric)  Goal: *Acute Goals and Plan of Care (Insert Text)  Physical Therapy Goals LT/ST  Initiated 3/12/2018 and to be accomplished within 3-5 day(s)  1. Patient will move from supine <> sit with S in prep for out of bed activity and change of position. 2.  Patient will perform sit<> stand with S with LRAD in prep for transfers/ambulation. 3.  Patient will transfer from bed <> chair with S with LRAD for time up in chair for completion of ADL activity. 4.  Patient will ambulate 150 feet with LRAD/S for improved functional mobility/safe discharge. 5.  Patient will ascend/descend 3-5 stairs with B/L handrail with contact guard assist for home re-entry as needed. Outcome: Progressing Towards Goal  physical Therapy EVALUATION    Patient: Jonathan Webber (3 y.o. female)  Date: 3/12/2018  Primary Diagnosis: UNILATERAL PRIMARY OSTEOARTHRITIS, RIGHT KNEE  Osteoarthritis of right knee  Procedure(s) (LRB):  RIGHT TOTAL KNEE REPLACMENT (Right) Day of Surgery   Precautions:  Fall, WBAT    ASSESSMENT :  Based on the objective data described below, the patient presents with decrease independence bed mobility, transfers, gait, and step negotiation. Pt seen in bed prior to session w/ IV, BP cuff, pulse ox, and B/L SCDs, w/ family present in the room. Pt reported 5/10 pain prior to session. Pt able to sit at the EOB to don underpants prior to gait training. Pt had no c/o lightheadedness or dizziness at this time. Pt able to ambulate RW/GB to the restroom to complete toileting task w/o any assistance. Pt able to ambulate 45 ft w/ RW/GB but demonstrates an antalgic gait, decrease stephie, decrease stride length, and decrease step clearance. Pt transferred to sitting on the EOB after session, call bell and tray in reach, nurse notified after session. Patient will benefit from skilled intervention to address the above impairments.   Patients rehabilitation potential is considered to be Good  Factors which may influence rehabilitation potential include:   []         None noted  []         Mental ability/status  []         Medical condition  []         Home/family situation and support systems  []         Safety awareness  []         Pain tolerance/management  []         Other:      PLAN :  Recommendations and Planned Interventions:  []           Bed Mobility Training             []    Neuromuscular Re-Education  []           Transfer Training                   []    Orthotic/Prosthetic Training  []           Gait Training                          []    Modalities  []           Therapeutic Exercises          []    Edema Management/Control  []           Therapeutic Activities            []    Patient and Family Training/Education  []           Other (comment):    Frequency/Duration: Patient will be followed by physical therapy twice daily to address goals. Discharge Recommendations: Home Health  Further Equipment Recommendations for Discharge: rolling walker     SUBJECTIVE:   Patient stated I feel fine.     OBJECTIVE DATA SUMMARY:     Past Medical History:   Diagnosis Date    Allergic rhinitis Dr. Ambika Jaime 2011     Colon adenoma 08/2016    Dr Fe Vail 2007, 2011, 8/16    DJD (degenerative joint disease)     B knees Dr. Merna Lopez FHx: heart disease     GERD (gastroesophageal reflux disease) 2007    [facility]    Hyperlipidemia     calculated risk score 1.9% (2/13); elected to take statins due to Fhx, elev hscrp/ldl-p but intol of 4 diff ones    Hypertension     resolved    Obesity (BMI 30.0-34.9)     peak weight 189 lbs, bmi 31.5 from 12/16    Overweight (BMI 25.0-29.9)     saw Dr Elda Moss; qsymia ineffective 6/14-6/15    Sjogren's disease, probable Dr. Davis Nones 8/12     Past Surgical History:   Procedure Laterality Date    HX APPENDECTOMY  1980s    HX BREAST BIOPSY      right    HX CHOLECYSTECTOMY  1980s   [address] [place] 2007, Dr. Shady Livingston 2011, 8/16 adenoma    HX TUBAL LIGATION       Barriers to Learning/Limitations: yes;  physical  Compensate with: Verbal Cues and Tactile Cues  Prior Level of Function/Home Situation:   Home Situation  Home Environment: Private residence  # Steps to Enter: 8  Rails to Enter: Yes (wide)  One/Two Story Residence: Two story, live on 1st floor  Living Alone: Yes  Support Systems: Family member(s)  Patient Expects to be Discharged to[de-identified] Private residence  Current DME Used/Available at Home: None  Critical Behavior:  Neurologic State: Alert  Orientation Level: Oriented X4  Cognition: Appropriate decision making; Follows commands  Safety/Judgement: Awareness of environment; Fall prevention  Psychosocial  Purposeful Interaction: Yes  Pt Identified Daily Priority: Clinical issues (comment)  Caring Interventions: Reassure; Therapeutic modalities  Reassure: Therapeutic listening; Informing  Therapeutic Modalities: Deep breathing  Skin Condition/Temp: Dry;Warm  Skin Integrity: Incision (comment) (right knee)  Skin Integumentary  Skin Color: Appropriate for ethnicity  Skin Condition/Temp: Dry;Warm  Skin Integrity: Incision (comment) (right knee)  Turgor: Non-tenting  Hair Growth: Present  Varicosities: Absent  Strength:    Strength: Generally decreased, functional  Tone & Sensation:   Tone: Normal  Sensation: Intact  Range Of Motion:  AROM: Generally decreased, functional  PROM: Generally decreased, functional  Functional Mobility:  Bed Mobility:  Supine to Sit: Stand-by assistance  Transfers:  Sit to Stand: Contact guard assistance  Stand to Sit: Contact guard assistance  Balance:   Sitting: Intact  Standing: Intact; With support  Ambulation/Gait Training:  Distance (ft): 45 Feet (ft)  Assistive Device: Gait belt;Walker, rolling  Ambulation - Level of Assistance: Contact guard assistance  Gait Description (WDL): Exceptions to WDL  Gait Abnormalities: Antalgic;Decreased step clearance  Right Side Weight Bearing: As tolerated  Base of Support: Shift to left  Stance: Right decreased  Speed/Martha: Slow  Step Length: Left shortened;Right shortened  Swing Pattern: Left asymmetrical;Right asymmetrical  Therapeutic Exercises: Therex packet handed to pt upon assessment  Pain:  Pain Scale 1: Numeric (0 - 10)  Pain Intensity 1: 3  Pain Location 1: Knee  Pain Orientation 1: Right  Pain Description 1: Throbbing;Burning  Pain Intervention(s) 1: Medication (see MAR)  Activity Tolerance:   Fair+  Please refer to the flowsheet for vital signs taken during this treatment. After treatment:   []         Patient left in no apparent distress sitting up in chair  [x]         Patient left in no apparent distress in bed (Sitting on the EOB)  [x]         Call bell left within reach  [x]         Nursing notified  [x]         Caregiver present (Family)  []         Bed alarm activated    COMMUNICATION/EDUCATION:   [x]         Fall prevention education was provided and the patient/caregiver indicated understanding. [x]         Patient/family have participated as able in goal setting and plan of care. [x]         Patient/family agree to work toward stated goals and plan of care. []         Patient understands intent and goals of therapy, but is neutral about his/her participation. []         Patient is unable to participate in goal setting and plan of care.     Thank you for this referral.  Arnold Abraham, PT   Time Calculation: 24 mins

## 2018-03-13 NOTE — PROGRESS NOTES
Progress Note     Patient: Ted Abraham MRN: [id number]  SSN: [id number]    YOB: 1960  Age: 61 y.o. Sex: female      POD:    1 Day Post-Op  S/P:    Procedure(s):  RIGHT TOTAL KNEE REPLACMENT     Subjective:   Pt is without complaints of pain. Patient able to complete PT this morning with stairs and had no difficulty. Objective:     Patient Vitals for the past 12 hrs:   BP Temp Pulse Resp SpO2 Weight   03/13/18 0704 135/74 98.3 °F (36.8 °C) 62 16 93 % -   03/13/18 0315 144/77 97.7 °F (36.5 °C) 60 16 97 % -   03/13/18 0135 - - - - - 90.3 kg (199 lb 1.2 oz)   03/13/18 0020 121/76 98.1 °F (36.7 °C) 63 16 93 % -     Recent Labs      03/13/18   0705   HGB  10.9*   HCT  35.0   NA  141   K  4.0   CL  106   CO2  30   BUN  13   CREA  0.80   GLU  105*       Pt. resting in chair. Lower extremity operative dressing clean/dry/intact. Neurovascularly intact. Calves soft, nontender. Assessment:     Awake and alert. In good spirits. Plan:   1. Continue pain management/ ice to operative area. 2. Continue to progress with PT/OT.   3. Discharge planning to home with home health today as long as her pain remains well-controlled and she passes PT.

## 2018-03-14 ENCOUNTER — HOME CARE VISIT (OUTPATIENT)
Dept: SCHEDULING | Facility: HOME HEALTH | Age: 58
End: 2018-03-14
Payer: COMMERCIAL

## 2018-03-14 VITALS
RESPIRATION RATE: 18 BRPM | SYSTOLIC BLOOD PRESSURE: 138 MMHG | DIASTOLIC BLOOD PRESSURE: 81 MMHG | OXYGEN SATURATION: 97 % | TEMPERATURE: 98.1 F | HEART RATE: 85 BPM

## 2018-03-14 VITALS — SYSTOLIC BLOOD PRESSURE: 135 MMHG | HEART RATE: 80 BPM | DIASTOLIC BLOOD PRESSURE: 70 MMHG | OXYGEN SATURATION: 93 %

## 2018-03-14 PROCEDURE — G0299 HHS/HOSPICE OF RN EA 15 MIN: HCPCS

## 2018-03-14 PROCEDURE — 400013 HH SOC

## 2018-03-14 PROCEDURE — G0151 HHCP-SERV OF PT,EA 15 MIN: HCPCS

## 2018-03-15 ENCOUNTER — HOME CARE VISIT (OUTPATIENT)
Dept: HOME HEALTH SERVICES | Facility: HOME HEALTH | Age: 58
End: 2018-03-15
Payer: COMMERCIAL

## 2018-03-15 VITALS
HEART RATE: 84 BPM | SYSTOLIC BLOOD PRESSURE: 138 MMHG | OXYGEN SATURATION: 97 % | DIASTOLIC BLOOD PRESSURE: 78 MMHG | TEMPERATURE: 98.7 F

## 2018-03-15 PROCEDURE — A6213 FOAM DRG >16<=48 SQ IN W/BDR: HCPCS

## 2018-03-15 PROCEDURE — G0157 HHC PT ASSISTANT EA 15: HCPCS

## 2018-03-16 ENCOUNTER — HOME CARE VISIT (OUTPATIENT)
Dept: SCHEDULING | Facility: HOME HEALTH | Age: 58
End: 2018-03-16
Payer: COMMERCIAL

## 2018-03-16 ENCOUNTER — HOME CARE VISIT (OUTPATIENT)
Dept: HOME HEALTH SERVICES | Facility: HOME HEALTH | Age: 58
End: 2018-03-16
Payer: COMMERCIAL

## 2018-03-16 VITALS
DIASTOLIC BLOOD PRESSURE: 65 MMHG | RESPIRATION RATE: 18 BRPM | SYSTOLIC BLOOD PRESSURE: 106 MMHG | TEMPERATURE: 97.9 F | HEART RATE: 73 BPM

## 2018-03-16 PROCEDURE — G0299 HHS/HOSPICE OF RN EA 15 MIN: HCPCS

## 2018-03-17 ENCOUNTER — HOME CARE VISIT (OUTPATIENT)
Dept: SCHEDULING | Facility: HOME HEALTH | Age: 58
End: 2018-03-17
Payer: COMMERCIAL

## 2018-03-17 PROCEDURE — G0157 HHC PT ASSISTANT EA 15: HCPCS

## 2018-03-19 ENCOUNTER — HOME CARE VISIT (OUTPATIENT)
Dept: SCHEDULING | Facility: HOME HEALTH | Age: 58
End: 2018-03-19
Payer: COMMERCIAL

## 2018-03-19 PROCEDURE — G0157 HHC PT ASSISTANT EA 15: HCPCS

## 2018-03-20 ENCOUNTER — HOME CARE VISIT (OUTPATIENT)
Dept: SCHEDULING | Facility: HOME HEALTH | Age: 58
End: 2018-03-20
Payer: COMMERCIAL

## 2018-03-20 VITALS
SYSTOLIC BLOOD PRESSURE: 145 MMHG | RESPIRATION RATE: 18 BRPM | TEMPERATURE: 97.5 F | DIASTOLIC BLOOD PRESSURE: 75 MMHG | OXYGEN SATURATION: 96 % | HEART RATE: 73 BPM

## 2018-03-20 PROCEDURE — G0299 HHS/HOSPICE OF RN EA 15 MIN: HCPCS

## 2018-03-21 ENCOUNTER — HOME CARE VISIT (OUTPATIENT)
Dept: SCHEDULING | Facility: HOME HEALTH | Age: 58
End: 2018-03-21
Payer: COMMERCIAL

## 2018-03-21 PROCEDURE — G0157 HHC PT ASSISTANT EA 15: HCPCS

## 2018-03-22 ENCOUNTER — HOME CARE VISIT (OUTPATIENT)
Dept: SCHEDULING | Facility: HOME HEALTH | Age: 58
End: 2018-03-22
Payer: COMMERCIAL

## 2018-03-22 VITALS
TEMPERATURE: 97.7 F | HEART RATE: 76 BPM | SYSTOLIC BLOOD PRESSURE: 129 MMHG | OXYGEN SATURATION: 90 % | DIASTOLIC BLOOD PRESSURE: 74 MMHG | RESPIRATION RATE: 18 BRPM

## 2018-03-22 VITALS
DIASTOLIC BLOOD PRESSURE: 75 MMHG | TEMPERATURE: 99.2 F | HEART RATE: 75 BPM | OXYGEN SATURATION: 97 % | SYSTOLIC BLOOD PRESSURE: 136 MMHG | SYSTOLIC BLOOD PRESSURE: 110 MMHG | HEART RATE: 71 BPM | DIASTOLIC BLOOD PRESSURE: 74 MMHG | OXYGEN SATURATION: 96 % | TEMPERATURE: 98.6 F

## 2018-03-22 PROCEDURE — G0299 HHS/HOSPICE OF RN EA 15 MIN: HCPCS

## 2018-03-23 ENCOUNTER — HOME CARE VISIT (OUTPATIENT)
Dept: SCHEDULING | Facility: HOME HEALTH | Age: 58
End: 2018-03-23
Payer: COMMERCIAL

## 2018-03-23 ENCOUNTER — HOME CARE VISIT (OUTPATIENT)
Dept: HOME HEALTH SERVICES | Facility: HOME HEALTH | Age: 58
End: 2018-03-23
Payer: COMMERCIAL

## 2018-03-23 PROCEDURE — G0157 HHC PT ASSISTANT EA 15: HCPCS

## 2018-03-26 ENCOUNTER — HOME CARE VISIT (OUTPATIENT)
Dept: SCHEDULING | Facility: HOME HEALTH | Age: 58
End: 2018-03-26
Payer: COMMERCIAL

## 2018-03-26 VITALS
OXYGEN SATURATION: 97 % | HEART RATE: 70 BPM | DIASTOLIC BLOOD PRESSURE: 75 MMHG | SYSTOLIC BLOOD PRESSURE: 110 MMHG | TEMPERATURE: 98.7 F

## 2018-03-26 PROCEDURE — G0151 HHCP-SERV OF PT,EA 15 MIN: HCPCS

## 2018-03-27 ENCOUNTER — HOME CARE VISIT (OUTPATIENT)
Dept: SCHEDULING | Facility: HOME HEALTH | Age: 58
End: 2018-03-27
Payer: COMMERCIAL

## 2018-03-27 VITALS — HEART RATE: 78 BPM | DIASTOLIC BLOOD PRESSURE: 92 MMHG | OXYGEN SATURATION: 98 % | SYSTOLIC BLOOD PRESSURE: 148 MMHG

## 2018-03-28 ENCOUNTER — HOME CARE VISIT (OUTPATIENT)
Dept: SCHEDULING | Facility: HOME HEALTH | Age: 58
End: 2018-03-28
Payer: COMMERCIAL

## 2018-03-28 PROCEDURE — G0151 HHCP-SERV OF PT,EA 15 MIN: HCPCS

## 2018-03-29 ENCOUNTER — HOME CARE VISIT (OUTPATIENT)
Dept: SCHEDULING | Facility: HOME HEALTH | Age: 58
End: 2018-03-29
Payer: COMMERCIAL

## 2018-03-29 VITALS
OXYGEN SATURATION: 99 % | RESPIRATION RATE: 18 BRPM | HEART RATE: 72 BPM | TEMPERATURE: 98.6 F | DIASTOLIC BLOOD PRESSURE: 72 MMHG | SYSTOLIC BLOOD PRESSURE: 128 MMHG

## 2018-03-29 VITALS — SYSTOLIC BLOOD PRESSURE: 174 MMHG | DIASTOLIC BLOOD PRESSURE: 93 MMHG | TEMPERATURE: 98.8 F | HEART RATE: 66 BPM

## 2018-03-29 PROCEDURE — G0299 HHS/HOSPICE OF RN EA 15 MIN: HCPCS

## 2018-03-30 ENCOUNTER — HOSPITAL ENCOUNTER (OUTPATIENT)
Dept: PHYSICAL THERAPY | Age: 58
Discharge: HOME | End: 2018-03-30
Payer: COMMERCIAL

## 2018-03-30 PROCEDURE — 97530 THERAPEUTIC ACTIVITIES: CPT | Performed by: PHYSICAL THERAPIST

## 2018-03-30 PROCEDURE — 97110 THERAPEUTIC EXERCISES: CPT | Performed by: PHYSICAL THERAPIST

## 2018-03-30 PROCEDURE — 97161 PT EVAL LOW COMPLEX 20 MIN: CPT | Performed by: PHYSICAL THERAPIST

## 2018-03-30 PROCEDURE — 97140 MANUAL THERAPY 1/> REGIONS: CPT | Performed by: PHYSICAL THERAPIST

## 2018-03-30 NOTE — PROGRESS NOTES
PT DAILY TREATMENT NOTE     Patient Name: Antonio Warren  Date:3/30/2018  : 1960  [x]  Patient  Verified  Payor: Matthew Huertas / Plan: Raúl Esquivel / Product Type: HMO /    In time:8:00  Out time:8:50  Total Treatment Time (min): 50  Visit #: 1 of 12    Treatment Area: Right knee pain [M25.561]    SUBJECTIVE  Pain Level (0-10 scale): 6-7/10  Any medication changes, allergies to medications, adverse drug reactions, diagnosis change, or new procedure performed?: [x] No    [] Yes (see summary sheet for update)  Subjective functional status/changes:   [] No changes reported  See Evaluation. OBJECTIVE    20 min [x]Eval                  []Re-Eval       10 min Therapeutic Exercise:  [] See flow sheet :   Rationale: increase ROM and increase strength to improve the patients ability to increase patient's functional activity level. 10 min Therapeutic Activity:  []  See flow sheet :   Rationale: increase strength and improve coordination  to improve the patients ability to increase patients ADLs. 10 min Manual Therapy:  Manual stretching for flexion and extension, patellar mobs   Rationale: increase ROM and increase tissue extensibility to increase ease of motion to improve function. With   [] TE   [] TA   [] neuro   [] other: Patient Education: [x] Review HEP    [] Progressed/Changed HEP based on:   [] positioning   [] body mechanics   [] transfers   [] heat/ice application    [] other:      Other Objective/Functional Measures:   See Evaluation. Pain Level (0-10 scale) post treatment: 5-6/10    ASSESSMENT/Changes in Function:   See Evaluation. Patient will continue to benefit from skilled PT services to modify and progress therapeutic interventions, address functional mobility deficits, address ROM deficits, analyze and address soft tissue restrictions and analyze and cue movement patterns to attain remaining goals.      [x]  See Plan of Care  []  See progress note/recertification  []  See Discharge Summary         Progress towards goals / Updated goals:  1. Patient's pain level will be 1-2/10 with activity in order to improve patient's ability to perform normal ADLs. Eval:  6-7/10  2. Patient will demonstrate 0-120 degrees AROM right knee to increase ease of ADLs. Eval:  12 - 85 degrees AROM  3. Patient will increase FOTO >  pts to increase functional mobility  Eval:  pending  4. Patient will ascend and descend steps with reciprocal pattern to increase ease of entry into home. Eval:  Doing steps one at a time.     PLAN  [x]  Upgrade activities as tolerated     [x]  Continue plan of care  []  Update interventions per flow sheet       []  Discharge due to:_  []  Other:_      Concha Amador, PT 3/30/2018  9:02 AM    Future Appointments  Date Time Provider Asael Solano   4/3/2018 12:00 PM Concha Amador, PT [facility]   4/6/2018 11:00 AM Concha Amador, PT [facility]   4/10/2018 12:00 PM Concha Amador, PT [facility]   4/13/2018 11:00 AM Concha Amador, PT [facility]   4/17/2018 12:00 PM Concha Amador, PT [facility]   4/20/2018 11:00 AM Concha Amador, PT [facility]   4/24/2018 12:00 PM Concha Amador, PT [facility]   4/27/2018 11:00 AM Concha Amador, PT [facility]

## 2018-03-30 NOTE — PROGRESS NOTES
[facility] at [address] [facility]  [address]  Phone: [phone]      Fax:  [phone]    Plan of Care/ Statement of Necessity for Physical Therapy Services  Patient name: Huey Oh Start of Care: 3/30/2018   Referral source: Ubaldo Ashton MD : 1960    Medical Diagnosis: Right knee pain [M25.561]   Onset Date:3/12/18    Treatment Diagnosis: OA Right knee   Prior Hospitalization: see medical history Provider#: [id number]   Medications: Verified on Patient summary List    Comorbidities: None   Prior Level of Function: Unable to kneel, did steps one at a time, instability upon standing, limited walking due to pain. The Plan of Care and following information is based on the information from the initial evaluation. Assessment/ key information: Patient with signs and symptoms consistent with s/p right knee arthroplasty on 3/12/18. Patient with pain and limited ROM with functional limitations. Patient will benefit from a program of skilled physical therapy to include therapeutic exercises to address strength deficits, therapeutic activities to improve functional mobility, neuromuscular reeducation to address balance, coordination and proprioception, manual therapy to address ROM and tissue extensibility and modalities as indicated. All questions were answered. Evaluation Complexity History LOW Complexity : Zero comorbidities / personal factors that will impact the outcome / POC; Examination LOW Complexity : 1-2 Standardized tests and measures addressing body structure, function, activity limitation and / or participation in recreation  ;Presentation MEDIUM Complexity : Evolving with changing characteristics  ; Clinical Decision Making MEDIUM Complexity : FOTO score of 26-74  Overall Complexity Rating: LOW   Problem List: pain affecting function, decrease ROM, decrease ADL/ functional abilitiies, decrease activity tolerance and decrease flexibility/ joint mobility   Treatment Plan may include any combination of the following: Therapeutic exercise, Therapeutic activities, Neuromuscular re-education, Physical agent/modality and Manual therapy  Patient / Family readiness to learn indicated by: asking questions  Persons(s) to be included in education: patient (P)  Barriers to Learning/Limitations: None  Patient Goal (s): I want to bend my knee again  Patient Self Reported Health Status: excellent  Rehabilitation Potential: good    Short Term Goals: To be accomplished in 2 weeks:  1. Patient will become proficient in her HEP and will be compliant in performing that program.    Long Term Goals: To be accomplished in 6 weeks:  1. Patient's pain level will be 1-2/10 with activity in order to improve patient's ability to perform normal ADLs. 2. Patient will demonstrate 0-120 degrees AROM right knee to increase ease of ADLs  3. Patient will increase FOTO >  pts to increase functional mobility  4. Patient will ascend and descend steps with reciprocal pattern to increase ease of entry into home      Frequency / Duration: Patient to be seen 2 times per week for 6 weeks. Patient/ Caregiver education and instruction: Diagnosis, prognosis, exercises   [x]  Plan of care has been reviewed with PTA    Guerrero Maxwell, PT 3/30/2018 8:57 AM  _____________________________________________________________________  I certify that the above Therapy Services are being furnished while the patient is under my care. I agree with the treatment plan and certify that this therapy is necessary.     Physician's Signature:____________________  Date:__________Time:______    Please sign and return to [facility] at [address]  Phone: [phone]      Fax:  [phone]

## 2018-04-03 ENCOUNTER — HOSPITAL ENCOUNTER (OUTPATIENT)
Dept: PHYSICAL THERAPY | Age: 58
Discharge: HOME | End: 2018-04-03
Payer: COMMERCIAL

## 2018-04-03 PROCEDURE — 97112 NEUROMUSCULAR REEDUCATION: CPT | Performed by: PHYSICAL THERAPIST

## 2018-04-03 PROCEDURE — 97530 THERAPEUTIC ACTIVITIES: CPT | Performed by: PHYSICAL THERAPIST

## 2018-04-03 PROCEDURE — 97110 THERAPEUTIC EXERCISES: CPT | Performed by: PHYSICAL THERAPIST

## 2018-04-03 PROCEDURE — 97140 MANUAL THERAPY 1/> REGIONS: CPT | Performed by: PHYSICAL THERAPIST

## 2018-04-03 NOTE — PROGRESS NOTES
PT DAILY TREATMENT NOTE     Patient Name: Jane Guan  Date:4/3/2018  : 1960  [x]  Patient  Verified  Payor: Carmen Severance / Plan: Vero Lara / Product Type: HMO /    In time:12:00  Out time:1:00  Total Treatment Time (min): 60  Visit #: 2 of 12    Treatment Area: Right knee pain [M25.561]    SUBJECTIVE  Pain Level (0-10 scale): 10  Any medication changes, allergies to medications, adverse drug reactions, diagnosis change, or new procedure performed?: [x] No    [] Yes (see summary sheet for update)  Subjective functional status/changes:   [] No changes reported  Patient reports she is having difficulty sleeping due to pain and not being able to find a position of comfort. OBJECTIVE    Modality rationale: decrease inflammation and decrease pain to improve the patients ability to increase ease of motion to improve function.    Min Type Additional Details    [] Estim:  []Unatt       []IFC  []Premod                        []Other:  []w/ice   []w/heat  Position:  Location:    [] Estim: []Att    []TENS instruct  []NMES                    []Other:  []w/US   []w/ice   []w/heat  Position:  Location:    []  Traction: [] Cervical       []Lumbar                       [] Prone          []Supine                       []Intermittent   []Continuous Lbs:  [] before manual  [] after manual    []  Ultrasound: []Continuous   [] Pulsed                           []1MHz   []3MHz W/cm2:  Location:    []  Iontophoresis with dexamethasone         Location: [] Take home patch   [] In clinic   10 [x]  Ice     []  heat  []  Ice massage  []  Laser   []  Anodyne Position: supine  Location: right knee    []  Laser with stim  []  Other:  Position:  Location:    []  Vasopneumatic Device Pressure:       [] lo [] med [] hi   Temperature: [] lo [] med [] hi   [] Skin assessment post-treatment:  []intact []redness- no adverse reaction    []redness  adverse reaction:     15 min Therapeutic Exercise:  [] See flow sheet : Rationale: increase ROM and increase strength to improve the patients ability to increase patient's functional activity level. 15 min Therapeutic Activity:  []  See flow sheet :   Rationale: improve coordination and improve balance  to improve the patients ability to increase patients ADLs. 10 min Neuromuscular Re-education:  []  See flow sheet :   Rationale: increase strength, improve coordination and increase proprioception  to improve the patients ability to increase patient's functional activity level. 10 min Manual Therapy:  Patellar mobs, manual stretching HS, Quads to increase flexion and extension. Rationale: increase ROM and increase tissue extensibility to increase ease of motion to improve function. With   [] TE   [] TA   [] neuro   [] other: Patient Education: [x] Review HEP    [] Progressed/Changed HEP based on:   [] positioning   [] body mechanics   [] transfers   [] heat/ice application    [] other:      Other Objective/Functional Measures:   Patient has pain at end range of flexion and extension. Antalgic gait, she is FWB and is not using an  Assistive device today. Pain Level (0-10 scale) post treatment: 6/10    ASSESSMENT/Changes in Function:   Patient continues with pain and limited ROM with altered gait. Patient will continue to benefit from skilled PT services to modify and progress therapeutic interventions, address functional mobility deficits, address ROM deficits, address strength deficits, analyze and address soft tissue restrictions, analyze and cue movement patterns and analyze and modify body mechanics/ergonomics to attain remaining goals. [x]  See Plan of Care  []  See progress note/recertification  []  See Discharge Summary         Progress towards goals / Updated goals:  1. Patient's pain level will be 1-2/10 with activity in order to improve patient's ability to perform normal ADLs. Eval:  6-7/10  2.  Patient will demonstrate 0-120 degrees AROM right knee to increase ease of ADLs. Eval:  12 - 85 degrees AROM  3. Patient will increase FOTO >  pts to increase functional mobility  Eval:  pending  4. Patient will ascend and descend steps with reciprocal pattern to increase ease of entry into home. Eval:  Doing steps one at a time.     PLAN  [x]  Upgrade activities as tolerated     [x]  Continue plan of care  []  Update interventions per flow sheet       []  Discharge due to:_  []  Other:_      Rika Marin, PT 4/3/2018  1:18 PM    Future Appointments  Date Time Provider Asael Solano   4/6/2018 11:00 AM Rika Marin, PT [facility]   4/10/2018 12:00 PM Rika Marin, PT [facility]   4/13/2018 11:00 AM Rika Marin PT [facility]   4/17/2018 12:00 PM Rika Marin, PT [facility]   4/20/2018 11:00 AM Rika Marin, PT [facility]   4/24/2018 12:00 PM Rika Marin, PT [facility]   4/27/2018 11:00 AM Rika Marin PT [facility]

## 2018-04-06 ENCOUNTER — HOSPITAL ENCOUNTER (OUTPATIENT)
Dept: PHYSICAL THERAPY | Age: 58
Discharge: HOME | End: 2018-04-06
Payer: COMMERCIAL

## 2018-04-06 PROCEDURE — 97112 NEUROMUSCULAR REEDUCATION: CPT | Performed by: PHYSICAL THERAPIST

## 2018-04-06 PROCEDURE — 97140 MANUAL THERAPY 1/> REGIONS: CPT | Performed by: PHYSICAL THERAPIST

## 2018-04-06 PROCEDURE — 97530 THERAPEUTIC ACTIVITIES: CPT | Performed by: PHYSICAL THERAPIST

## 2018-04-06 PROCEDURE — 97110 THERAPEUTIC EXERCISES: CPT | Performed by: PHYSICAL THERAPIST

## 2018-04-06 NOTE — PROGRESS NOTES
PT DAILY TREATMENT NOTE     Patient Name: Wesley Culp  Date:2018  : 1960  [x]  Patient  Verified  Payor: Melody Leung / Plan: Paluina Wadena / Product Type: HMO /    In time:10:00  Out time:11:05  Total Treatment Time (min): 65  Visit #: 3 of 12    Treatment Area: Right knee pain [M25.561]    SUBJECTIVE  Pain Level (0-10 scale): 3  Any medication changes, allergies to medications, adverse drug reactions, diagnosis change, or new procedure performed?: [x] No    [] Yes (see summary sheet for update)  Subjective functional status/changes:   [] No changes reported  Patient reports that she is doing well today but had some increased pain yesterday. She also still has problems sleeping. OBJECTIVE    Modality rationale: increase tissue extensibility to improve the patients ability to increase ease of motion to improve function.    Min Type Additional Details    [] Estim:  []Unatt       []IFC  []Premod                        []Other:  []w/ice   []w/heat  Position:  Location:    [] Estim: []Att    []TENS instruct  []NMES                    []Other:  []w/US   []w/ice   []w/heat  Position:  Location:    []  Traction: [] Cervical       []Lumbar                       [] Prone          []Supine                       []Intermittent   []Continuous Lbs:  [] before manual  [] after manual    []  Ultrasound: []Continuous   [] Pulsed                           []1MHz   []3MHz W/cm2:  Location:    []  Iontophoresis with dexamethasone         Location: [] Take home patch   [] In clinic   10 []  Ice     [x]  heat  []  Ice massage  []  Laser   []  Anodyne Position: prone  Location: posterior knee for prone hang with 1# weight.    []  Laser with stim  []  Other:  Position:  Location:    []  Vasopneumatic Device Pressure:       [] lo [] med [] hi   Temperature: [] lo [] med [] hi   [] Skin assessment post-treatment:  []intact []redness- no adverse reaction    []redness  adverse reaction:     20 min Therapeutic Exercise:  [] See flow sheet :   Rationale: increase ROM and increase strength to improve the patients ability to increase patient's functional activity level. 15 min Therapeutic Activity:  []  See flow sheet :   Rationale: increase strength, improve coordination and improve balance  to improve the patients ability to improve ambulation on all surfaces. 10 min Neuromuscular Re-education:  []  See flow sheet :   Rationale: increase strength, improve coordination and increase proprioception  to improve the patients ability to increase patients ADLs. 10 min Manual Therapy:  Patellar mobs, manual stretching for increased flexion and extension. Rationale: increase ROM and increase tissue extensibility to increase ease of motion to improve function. With   [] TE   [] TA   [] neuro   [] other: Patient Education: [x] Review HEP    [] Progressed/Changed HEP based on:   [] positioning   [] body mechanics   [] transfers   [] heat/ice application    [] other:      Other Objective/Functional Measures:   PROM 9-105 degrees. Capsular end feel with pain at end range. Pain Level (0-10 scale) post treatment: 5    ASSESSMENT/Changes in Function:   Patient has increased ROM in the right knee. Patient will continue to benefit from skilled PT services to modify and progress therapeutic interventions, address functional mobility deficits, address ROM deficits, address strength deficits, analyze and address soft tissue restrictions, analyze and cue movement patterns and analyze and modify body mechanics/ergonomics to attain remaining goals. [x]  See Plan of Care  []  See progress note/recertification  []  See Discharge Summary         Progress towards goals / Updated goals:  1. Patient's pain level will be 1-2/10 with activity in order to improve patient's ability to perform normal ADLs. Eval:  6-7/10  2. Patient will demonstrate 0-120 degrees AROM right knee to increase ease of ADLs.   Eval:  12 - 85 degrees AROM  Current:  9-105 degrees. 4/6/18  3. Patient will increase FOTO > 20 pts (to 61) to increase functional mobility  Eval: 41  4. Patient will ascend and descend steps with reciprocal pattern to increase ease of entry into home. Eval:  Doing steps one at a time.     PLAN  [x]  Upgrade activities as tolerated     [x]  Continue plan of care  []  Update interventions per flow sheet       []  Discharge due to:_  []  Other:_      Armani Fischer, PT 4/6/2018  11:18 AM    Future Appointments  Date Time Provider Asael Solano   4/10/2018 11:30 AM Armani Fischer, PT [facility]   4/13/2018 11:00 AM Armani Fischer, PT [facility]   4/17/2018 12:00 PM Armani Fischer, PT [facility]   4/20/2018 11:00 AM Armani Fischer, PT [facility]   4/24/2018 12:00 PM Armani Fischer, PT [facility]   4/27/2018 11:00 AM Smitha Lucio, PTA [facility]

## 2018-04-10 ENCOUNTER — HOSPITAL ENCOUNTER (OUTPATIENT)
Dept: PHYSICAL THERAPY | Age: 58
Discharge: HOME | End: 2018-04-10
Payer: COMMERCIAL

## 2018-04-10 PROCEDURE — 97110 THERAPEUTIC EXERCISES: CPT | Performed by: PHYSICAL THERAPIST

## 2018-04-10 PROCEDURE — 97140 MANUAL THERAPY 1/> REGIONS: CPT | Performed by: PHYSICAL THERAPIST

## 2018-04-10 NOTE — PROGRESS NOTES
PT DAILY TREATMENT NOTE     Patient Name: Julio Cesar Dias  Date:4/10/2018  : 1960  [x]  Patient  Verified  Payor: Abdiel Espino / Plan: Mona Mello / Product Type: HMO /    In time:11:30  Out time:12:28  Total Treatment Time (min): 62  Visit #: 4 of 12    Treatment Area: Right knee pain [M25.561]    SUBJECTIVE  Pain Level (0-10 scale): 6  Any medication changes, allergies to medications, adverse drug reactions, diagnosis change, or new procedure performed?: [x] No    [] Yes (see summary sheet for update)  Subjective functional status/changes:   [] No changes reported  Patient c/o increased pain since Friday. She states pain is mainly on the lateral knee. She has applied ice and heat and still notes pain. OBJECTIVE    Modality rationale: decrease edema, decrease inflammation and decrease pain to improve the patients ability to increase ease of motion to improve function.    Min Type Additional Details    [] Estim:  []Unatt       []IFC  []Premod                        []Other:  []w/ice   []w/heat  Position:  Location:    [] Estim: []Att    []TENS instruct  []NMES                    []Other:  []w/US   []w/ice   []w/heat  Position:  Location:    []  Traction: [] Cervical       []Lumbar                       [] Prone          []Supine                       []Intermittent   []Continuous Lbs:  [] before manual  [] after manual    []  Ultrasound: []Continuous   [] Pulsed                           []1MHz   []3MHz W/cm2:  Location:    []  Iontophoresis with dexamethasone         Location: [] Take home patch   [] In clinic   10 [x]  Ice     []  heat  []  Ice massage  []  Laser   []  Anodyne Position: supine  Location: Right knee    []  Laser with stim  []  Other:  Position:  Location:    []  Vasopneumatic Device Pressure:       [] lo [] med [] hi   Temperature: [] lo [] med [] hi   [] Skin assessment post-treatment:  []intact []redness- no adverse reaction    []redness  adverse reaction:     38 min Therapeutic Exercise:  [] See flow sheet :   Rationale: increase ROM and increase strength to improve the patients ability to increase patient's functional activity level. 10 min Manual Therapy:  IASTM to the right knee lateral knee and over and around the pes anserine attachment. Rationale: decrease pain to increase ease of motion to improve function. With   [] TE   [] TA   [] neuro   [] other: Patient Education: [x] Review HEP    [] Progressed/Changed HEP based on:   [] positioning   [] body mechanics   [] transfers   [] heat/ice application    [] other:      Other Objective/Functional Measures:   Tenderness over the lateral aspect of the right knee, she also has tenderness to palpation over the pes anserine with mild swelling. IASTM applied to the lateral knee at the joint line and just distal to the joint line. Held manual stretching due to increased pain today. Treatment altered to accommodate the patient. She has an antalgic gait and is ambulating FWB. Pain Level (0-10 scale) post treatment: 3/10    ASSESSMENT/Changes in Function:   Patient with increased pain today. She did tolerate a modified program and note that she has less pain following IASTM. She was able to walk out with less pain, 3/10 from 6/10. Patient will continue to benefit from skilled PT services to modify and progress therapeutic interventions, address functional mobility deficits, address ROM deficits, address strength deficits, analyze and address soft tissue restrictions, analyze and cue movement patterns and analyze and modify body mechanics/ergonomics to attain remaining goals. [x]  See Plan of Care  []  See progress note/recertification  []  See Discharge Summary         Progress towards goals / Updated goals:  1. Patient's pain level will be 1-2/10 with activity in order to improve patient's ability to perform normal ADLs. Eval:  6-7/10  2.  Patient will demonstrate 0-120 degrees AROM right knee to increase ease of ADLs. Eval:  12 - 85 degrees AROM  Current:  9-105 degrees. 4/6/18  3. Patient will increase FOTO > 20 pts (to 61) to increase functional mobility  Eval: 41  4. Patient will ascend and descend steps with reciprocal pattern to increase ease of entry into home. Eval:  Doing steps one at a time.     PLAN  [x]  Upgrade activities as tolerated     [x]  Continue plan of care  []  Update interventions per flow sheet       []  Discharge due to:_  []  Other:_      Jennifer Borden, PT 4/10/2018  12:11 PM    Future Appointments  Date Time Provider Asael Solano   4/13/2018 11:00 AM Jennifer Borden, PT [facility]   4/17/2018 12:00 PM Jennifer Borden, PT [facility]   4/20/2018 11:00 AM Jennifer Borden, PT [facility]   4/24/2018 12:00 PM Jennifer Borden, PT [facility]   4/27/2018 11:00 AM Zack Pruett PTA [facility]

## 2018-04-13 ENCOUNTER — HOSPITAL ENCOUNTER (OUTPATIENT)
Dept: PHYSICAL THERAPY | Age: 58
Discharge: HOME | End: 2018-04-13
Payer: COMMERCIAL

## 2018-04-13 PROCEDURE — 97140 MANUAL THERAPY 1/> REGIONS: CPT | Performed by: PHYSICAL THERAPIST

## 2018-04-13 PROCEDURE — 97112 NEUROMUSCULAR REEDUCATION: CPT | Performed by: PHYSICAL THERAPIST

## 2018-04-13 PROCEDURE — 97110 THERAPEUTIC EXERCISES: CPT | Performed by: PHYSICAL THERAPIST

## 2018-04-13 PROCEDURE — 97530 THERAPEUTIC ACTIVITIES: CPT | Performed by: PHYSICAL THERAPIST

## 2018-04-13 NOTE — PROGRESS NOTES
PT DAILY TREATMENT NOTE     Patient Name: Alin Duffy  VLET:  : 1960  [x]  Patient  Verified  Payor: [employer] / Plan: Joselito Bolderrick / Product Type: HMO /    In time:11:00  Out time:12:05  Total Treatment Time (min): 65  Visit #: 5 of 12    Treatment Area: Right knee pain [M25.561]    SUBJECTIVE  Pain Level (0-10 scale): 3/10  Any medication changes, allergies to medications, adverse drug reactions, diagnosis change, or new procedure performed?: [x] No    [] Yes (see summary sheet for update)  Subjective functional status/changes:   [] No changes reported  Patient continues to note a lower level of pain since her last visit. OBJECTIVE    Modality rationale: decrease edema, decrease inflammation and decrease pain to improve the patients ability to increase ease of motion to improve function.    Min Type Additional Details    [] Estim:  []Unatt       []IFC  []Premod                        []Other:  []w/ice   []w/heat  Position:  Location:    [] Estim: []Att    []TENS instruct  []NMES                    []Other:  []w/US   []w/ice   []w/heat  Position:  Location:    []  Traction: [] Cervical       []Lumbar                       [] Prone          []Supine                       []Intermittent   []Continuous Lbs:  [] before manual  [] after manual    []  Ultrasound: []Continuous   [] Pulsed                           []1MHz   []3MHz W/cm2:  Location:    []  Iontophoresis with dexamethasone         Location: [] Take home patch   [] In clinic   10 [x]  Ice     []  heat  []  Ice massage  []  Laser   []  Anodyne Position: long sitting  Location: right knee    []  Laser with stim  []  Other:  Position:  Location:    []  Vasopneumatic Device Pressure:       [] lo [] med [] hi   Temperature: [] lo [] med [] hi   [] Skin assessment post-treatment:  []intact []redness- no adverse reaction    []redness  adverse reaction:       15 min Therapeutic Exercise:  [] See flow sheet :   Rationale: increase ROM, increase strength, improve coordination and improve balance to improve the patients ability to increase patient's functional activity level. 15 min Therapeutic Activity:  []  See flow sheet :   Rationale: increase strength, improve coordination and improve balance  to improve the patients ability to ambulate on all surfaces. 15 min Neuromuscular Re-education:  []  See flow sheet :   Rationale: increase strength, improve coordination and increase proprioception  to improve the patients ability to increase patients ADLs. 10 min Manual Therapy:  Patellar mobs, manual stretching to increase flexion and extension. IASTM around the right anterior knee. Rationale: decrease pain, increase ROM and increase tissue extensibility to increase ease of motion to improve function. With   [] TE   [] TA   [] neuro   [] other: Patient Education: [x] Review HEP    [] Progressed/Changed HEP based on:   [] positioning   [] body mechanics   [] transfers   [] heat/ice application    [] other:      Other Objective/Functional Measures:   Passive flexion to 105 degrees. Antalgic gait with right knee in 10 degrees knee flexion contracture. She is able to do steps reciprocally but with guarding and pain. Pain Level (0-10 scale) post treatment: 2/10    ASSESSMENT/Changes in Function:   Patient continues with pain and limited ROM affecting her normal functional activity level. Patient will continue to benefit from skilled PT services to modify and progress therapeutic interventions, address functional mobility deficits, address ROM deficits, address strength deficits, analyze and address soft tissue restrictions, analyze and cue movement patterns and analyze and modify body mechanics/ergonomics to attain remaining goals. [x]  See Plan of Care  []  See progress note/recertification  []  See Discharge Summary         Progress towards goals / Updated goals:  1.  Patient's pain level will be 1-2/10 with activity in order to improve patient's ability to perform normal ADLs. Eval:  6-7/10  Current:  2/10 - 8/10. 4/13/18  2. Patient will demonstrate 0-120 degrees AROM right knee to increase ease of ADLs. Eval:  12 - 85 degrees AROM  Current:  9-105 degrees.  4/6/18  3. Patient will increase FOTO > 20 pts (to 61) to increase functional mobility  Eval: 41  4. Patient will ascend and descend steps with reciprocal pattern to increase ease of entry into home. Eval:  Doing steps one at a time. Current:  Doing steps reciprocally with hand rail and pain.  4/13/18    PLAN  [x]  Upgrade activities as tolerated     [x]  Continue plan of care  []  Update interventions per flow sheet       []  Discharge due to:_  []  Other:_      Christal Mares, PT 4/13/2018  11:09 AM    Future Appointments  Date Time Provider Asael Solano   4/17/2018 4:00 PM Christal Mares, PT [facility]   4/20/2018 11:00 AM Christal Mares, PT [facility]   4/24/2018 12:00 PM Christal Mares, PT [facility]   4/27/2018 11:00 AM Alfredo Carnes, PTA [facility]

## 2018-04-17 ENCOUNTER — HOSPITAL ENCOUNTER (OUTPATIENT)
Dept: PHYSICAL THERAPY | Age: 58
Discharge: HOME | End: 2018-04-17
Payer: COMMERCIAL

## 2018-04-17 PROCEDURE — 97140 MANUAL THERAPY 1/> REGIONS: CPT | Performed by: PHYSICAL THERAPIST

## 2018-04-17 PROCEDURE — 97110 THERAPEUTIC EXERCISES: CPT | Performed by: PHYSICAL THERAPIST

## 2018-04-17 PROCEDURE — 97112 NEUROMUSCULAR REEDUCATION: CPT | Performed by: PHYSICAL THERAPIST

## 2018-04-17 PROCEDURE — 97530 THERAPEUTIC ACTIVITIES: CPT | Performed by: PHYSICAL THERAPIST

## 2018-04-17 NOTE — PROGRESS NOTES
PT DAILY TREATMENT NOTE     Patient Name: Silvia Klein  RDSU:  : 1960  [x]  Patient  Verified  Payor: Darwin Last / Plan: Ron Hobson / Product Type: HMO /    In time:4:00  Out time:5:00  Total Treatment Time (min): 60  Visit #: 6 of 12    Treatment Area: Right knee pain [M25.561]    SUBJECTIVE  Pain Level (0-10 scale): 2-3/10  Any medication changes, allergies to medications, adverse drug reactions, diagnosis change, or new procedure performed?: [x] No    [] Yes (see summary sheet for update)  Subjective functional status/changes:   [] No changes reported  Patient reports she has been compliant with her HEP especially trying to flex her right knee. OBJECTIVE    Modality rationale: decrease inflammation and decrease pain to improve the patients ability to increase ease of motion to improve function.    Min Type Additional Details    [] Estim:  []Unatt       []IFC  []Premod                        []Other:  []w/ice   []w/heat  Position:  Location:    [] Estim: []Att    []TENS instruct  []NMES                    []Other:  []w/US   []w/ice   []w/heat  Position:  Location:    []  Traction: [] Cervical       []Lumbar                       [] Prone          []Supine                       []Intermittent   []Continuous Lbs:  [] before manual  [] after manual    []  Ultrasound: []Continuous   [] Pulsed                           []1MHz   []3MHz W/cm2:  Location:    []  Iontophoresis with dexamethasone         Location: [] Take home patch   [] In clinic   10 [x]  Ice     []  heat  []  Ice massage  []  Laser   []  Anodyne Position:  Location:    []  Laser with stim  []  Other:  Position:  Location:    []  Vasopneumatic Device Pressure:       [] lo [] med [] hi   Temperature: [] lo [] med [] hi   [] Skin assessment post-treatment:  []intact []redness- no adverse reaction    []redness  adverse reaction:     15 min Therapeutic Exercise:  [] See flow sheet :   Rationale: increase ROM and increase strength to improve the patients ability to increase ease of motion to improve function. 15 min Therapeutic Activity:  []  See flow sheet :   Rationale: improve coordination and improve balance  to improve the patients ability to ambulate on all surfaces. 10 min Neuromuscular Re-education:  []  See flow sheet :   Rationale: increase strength, improve coordination and increase proprioception  to improve the patients ability to increase patients ADLs. 10 min Manual Therapy:  Manual stretching HS, Quads, Mobilizations to the right knee to increase flexion and extension. Rationale: increase ROM and increase tissue extensibility to increase ease of motion to improve function. With   [] TE   [] TA   [] neuro   [] other: Patient Education: [x] Review HEP    [] Progressed/Changed HEP based on:   [] positioning   [] body mechanics   [] transfers   [] heat/ice application    [] other:      Other Objective/Functional Measures:   AROM:  7-106 degrees. Pain Level (0-10 scale) post treatment: 0/10    ASSESSMENT/Changes in Function:   Patient continues to have right knee pain and slowly increasing her ROM. Patient will continue to benefit from skilled PT services to modify and progress therapeutic interventions, address functional mobility deficits, address ROM deficits, address strength deficits, analyze and address soft tissue restrictions, analyze and cue movement patterns and analyze and modify body mechanics/ergonomics to attain remaining goals. [x]  See Plan of Care  []  See progress note/recertification  []  See Discharge Summary         Progress towards goals / Updated goals:  1. Patient's pain level will be 1-2/10 with activity in order to improve patient's ability to perform normal ADLs. Eval:  6-7/10  Current:  2/10 - 8/10. 4/13/18  2. Patient will demonstrate 0-120 degrees AROM right knee to increase ease of ADLs. Eval:  12 - 85 degrees AROM  Current:  7-106 degrees.  4/17/18  3. Patient will increase FOTO > 20 pts (to 61) to increase functional mobility  Eval: 41  4. Patient will ascend and descend steps with reciprocal pattern to increase ease of entry into home. Eval:  Doing steps one at a time. Current:  Doing steps reciprocally with hand rail and pain.  4/13/18       PLAN  [x]  Upgrade activities as tolerated     [x]  Continue plan of care  []  Update interventions per flow sheet       []  Discharge due to:_  []  Other:_      Mary Lou Officer, PT 4/17/2018  4:33 PM    Future Appointments  Date Time Provider Asael Solano   4/20/2018 11:00 AM Mary Lou Arreolar, PT [facility]   4/24/2018 12:00 PM Mary Lou Arreolar, PT [facility]   4/27/2018 11:00 AM Katerina Buckley, PTA [facility]

## 2018-04-20 ENCOUNTER — HOSPITAL ENCOUNTER (OUTPATIENT)
Dept: PHYSICAL THERAPY | Age: 58
Discharge: HOME | End: 2018-04-20
Payer: COMMERCIAL

## 2018-04-20 PROCEDURE — 97140 MANUAL THERAPY 1/> REGIONS: CPT | Performed by: PHYSICAL THERAPIST

## 2018-04-20 PROCEDURE — 97530 THERAPEUTIC ACTIVITIES: CPT | Performed by: PHYSICAL THERAPIST

## 2018-04-20 PROCEDURE — 97112 NEUROMUSCULAR REEDUCATION: CPT | Performed by: PHYSICAL THERAPIST

## 2018-04-20 PROCEDURE — 97110 THERAPEUTIC EXERCISES: CPT | Performed by: PHYSICAL THERAPIST

## 2018-04-20 NOTE — PROGRESS NOTES
PT DAILY TREATMENT NOTE     Patient Name: Lynnette Ochoa  Date:2018  : 1960  [x]  Patient  Verified  Payor: Willie Rob / Plan: Jelly Gross / Product Type: HMO /    In time:11:05  Out time:12:07  Total Treatment Time (min): 58  Visit #: 7 of 12    Treatment Area: Right knee pain [M25.561]    SUBJECTIVE  Pain Level (0-10 scale): 2-3/10  Any medication changes, allergies to medications, adverse drug reactions, diagnosis change, or new procedure performed?: [x] No    [] Yes (see summary sheet for update)  Subjective functional status/changes:   [] No changes reported  Patient notes that her knee remains constantly sore with pain on ROM whether active or passive. She has not been doing her prone hangs but states she is doing her other exercises. OBJECTIVE    Modality rationale: decrease inflammation and decrease pain to improve the patients ability to increase ease of motion to improve function.    Min Type Additional Details    [] Estim:  []Unatt       []IFC  []Premod                        []Other:  []w/ice   []w/heat  Position:  Location:    [] Estim: []Att    []TENS instruct  []NMES                    []Other:  []w/US   []w/ice   []w/heat  Position:  Location:    []  Traction: [] Cervical       []Lumbar                       [] Prone          []Supine                       []Intermittent   []Continuous Lbs:  [] before manual  [] after manual    []  Ultrasound: []Continuous   [] Pulsed                           []1MHz   []3MHz W/cm2:  Location:    []  Iontophoresis with dexamethasone         Location: [] Take home patch   [] In clinic   10 [x]  Ice     []  heat  []  Ice massage  []  Laser   []  Anodyne Position: sitting  Location: right knee    []  Laser with stim  []  Other:  Position:  Location:    []  Vasopneumatic Device Pressure:       [] lo [] med [] hi   Temperature: [] lo [] med [] hi   [] Skin assessment post-treatment:  []intact []redness- no adverse reaction    []redness  adverse reaction:     20 min Therapeutic Exercise:  [] See flow sheet :   Rationale: increase ROM and increase strength to improve the patients ability to increase patient's functional activity level. 15 min Therapeutic Activity:  []  See flow sheet :   Rationale: improve coordination and improve balance  to improve the patients ability to safely ambulate on all surfaces. 15 min Neuromuscular Re-education:  []  See flow sheet :   Rationale: increase strength, improve coordination and increase proprioception  to improve the patients ability to increase patients ADLs. 10 min Manual Therapy:  Patellar mobs, manual stretching of HS and Quads to increase flexion and extension. Rationale: increase ROM and increase tissue extensibility to increase ease of motion to improve function. With   [] TE   [] TA   [] neuro   [] other: Patient Education: [x] Review HEP    [] Progressed/Changed HEP based on:   [] positioning   [] body mechanics   [] transfers   [] heat/ice application    [] other:      Other Objective/Functional Measures:   Pain and capsular end feel to knee flexion and extension. She has significant guarding with manual stretching. Pain Level (0-10 scale) post treatment: 3/10. ASSESSMENT/Changes in Function:   Patient continues with right knee pain with limited ROM and altered gait. Patient will continue to benefit from skilled PT services to modify and progress therapeutic interventions, address functional mobility deficits, address ROM deficits, address strength deficits, analyze and address soft tissue restrictions, analyze and cue movement patterns and analyze and modify body mechanics/ergonomics to attain remaining goals. [x]  See Plan of Care  []  See progress note/recertification  []  See Discharge Summary         Progress towards goals / Updated goals:  1. Patient's pain level will be 1-2/10 with activity in order to improve patient's ability to perform normal ADLs.   Eval:  6-7/10  Current:  2/10 - 8/10. 4/13/18  2. Patient will demonstrate 0-120 degrees AROM right knee to increase ease of ADLs. Eval:  12 - 85 degrees AROM  Current:  7-106 degrees.  4/17/18  3. Patient will increase FOTO > 20 pts (to 61) to increase functional mobility  Eval: 41  4. Patient will ascend and descend steps with reciprocal pattern to increase ease of entry into home. Eval:  Doing steps one at a time. Current:  Doing steps reciprocally with hand rail and pain.  4/13/18       PLAN  [x]  Upgrade activities as tolerated     [x]  Continue plan of care  []  Update interventions per flow sheet       []  Discharge due to:_  []  Other:_      Yonathan Montalvo PT 4/20/2018  11:14 AM    Future Appointments  Date Time Provider Asael Solano   4/24/2018 12:00 PM Yonathan Montalvo PT [facility]   4/27/2018 11:00 AM Maria De Jesus Alva PTA [facility]

## 2018-04-24 ENCOUNTER — HOSPITAL ENCOUNTER (OUTPATIENT)
Dept: PHYSICAL THERAPY | Age: 58
Discharge: HOME | End: 2018-04-24
Payer: COMMERCIAL

## 2018-04-24 PROCEDURE — 97112 NEUROMUSCULAR REEDUCATION: CPT | Performed by: PHYSICAL THERAPIST

## 2018-04-24 PROCEDURE — 97110 THERAPEUTIC EXERCISES: CPT | Performed by: PHYSICAL THERAPIST

## 2018-04-24 PROCEDURE — 97140 MANUAL THERAPY 1/> REGIONS: CPT | Performed by: PHYSICAL THERAPIST

## 2018-04-24 PROCEDURE — 97530 THERAPEUTIC ACTIVITIES: CPT | Performed by: PHYSICAL THERAPIST

## 2018-04-24 NOTE — PROGRESS NOTES
PT DAILY TREATMENT NOTE     Patient Name: Jamey Estevez  Date:2018  : 1960  [x]  Patient  Verified  Payor: Dianelys Rees / Plan: Sowmya Pair / Product Type: HMO /    In time:12:00  Out time:1:05  Total Treatment Time (min): 65  Visit #: 8 of 12    Treatment Area: Right knee pain [M25.561]    SUBJECTIVE  Pain Level (0-10 scale): 3/10  Any medication changes, allergies to medications, adverse drug reactions, diagnosis change, or new procedure performed?: [x] No    [] Yes (see summary sheet for update)  Subjective functional status/changes:   [] No changes reported  Patient continues to c/o pain in the right knee. States she did not realize it would still be hurting 6 weeks after her surgery. She notes the pain is around the entire anterior aspect of her right knee. OBJECTIVE    Modality rationale: decrease inflammation and decrease pain to improve the patients ability to increase ease of motion to improve function.    Min Type Additional Details    [] Estim:  []Unatt       []IFC  []Premod                        []Other:  []w/ice   []w/heat  Position:  Location:    [] Estim: []Att    []TENS instruct  []NMES                    []Other:  []w/US   []w/ice   []w/heat  Position:  Location:    []  Traction: [] Cervical       []Lumbar                       [] Prone          []Supine                       []Intermittent   []Continuous Lbs:  [] before manual  [] after manual    []  Ultrasound: []Continuous   [] Pulsed                           []1MHz   []3MHz W/cm2:  Location:    []  Iontophoresis with dexamethasone         Location: [] Take home patch   [] In clinic   10 [x]  Ice     []  heat  []  Ice massage  []  Laser   []  Anodyne Position: long sitting  Location: right knee    []  Laser with stim  []  Other:  Position:  Location:    []  Vasopneumatic Device Pressure:       [] lo [] med [] hi   Temperature: [] lo [] med [] hi   [] Skin assessment post-treatment:  []intact []redness- no adverse reaction    []redness  adverse reaction:     20 min Therapeutic Exercise:  [] See flow sheet :   Rationale: increase ROM and increase strength to improve the patients ability to increase patient's functional activity level. 15 min Therapeutic Activity:  []  See flow sheet :   Rationale: improve coordination and improve balance  to improve the patients ability to ambulate safely on all surfaces. 10 min Neuromuscular Re-education:  []  See flow sheet :   Rationale: increase strength, improve coordination and increase proprioception  to improve the patients ability to increase patients ADLs. 10 min Manual Therapy:  IASTM, manual stretching, knee mobs to improve flexion and extension. Rationale: increase ROM and increase tissue extensibility to increase ease of motion to improve function. With   [] TE   [] TA   [] neuro   [] other: Patient Education: [x] Review HEP    [] Progressed/Changed HEP based on:   [] positioning   [] body mechanics   [] transfers   [] heat/ice application    [] other:      Other Objective/Functional Measures:   Patient has tenderness to palpation over the entire right anterior knee. She has a mild antalgic gait. Pain and active guarding with passive flexion and extension. Pain Level (0-10 scale) post treatment: 2-3/10    ASSESSMENT/Changes in Function:   Patient continues with pain and limited ROM affecting function. She has returned to work part time. Patient will continue to benefit from skilled PT services to modify and progress therapeutic interventions, address functional mobility deficits, address ROM deficits, address strength deficits, analyze and address soft tissue restrictions, analyze and cue movement patterns and analyze and modify body mechanics/ergonomics to attain remaining goals. [x]  See Plan of Care  []  See progress note/recertification  []  See Discharge Summary         Progress towards goals / Updated goals:  1.  Patient's pain level will be 1-2/10 with activity in order to improve patient's ability to perform normal ADLs. Eval:  6-7/10  Current:  2/10 - 8/10. 4/13/18  2. Patient will demonstrate 0-120 degrees AROM right knee to increase ease of ADLs. Eval:  12 - 85 degrees AROM  Current:  7-106 degrees.  4/17/18  3. Patient will increase FOTO > 20 pts (to 61) to increase functional mobility  Eval: 43.  4/17/18. 4. Patient will ascend and descend steps with reciprocal pattern to increase ease of entry into home. Eval:  Doing steps one at a time. Current:  Doing steps reciprocally with hand rail and pain.  4/13/18    PLAN  [x]  Upgrade activities as tolerated     [x]  Continue plan of care  []  Update interventions per flow sheet       []  Discharge due to:_  []  Other:_      Celso Cartwright, PT 4/24/2018  1:18 PM    Future Appointments  Date Time Provider Asael Solano   4/27/2018 11:00 AM [id number] [facility]

## 2018-04-27 ENCOUNTER — HOSPITAL ENCOUNTER (OUTPATIENT)
Dept: PHYSICAL THERAPY | Age: 58
Discharge: HOME | End: 2018-04-27
Payer: COMMERCIAL

## 2018-04-27 PROCEDURE — 97140 MANUAL THERAPY 1/> REGIONS: CPT

## 2018-04-27 PROCEDURE — 97110 THERAPEUTIC EXERCISES: CPT

## 2018-04-27 NOTE — PROGRESS NOTES
PT DAILY TREATMENT NOTE     Patient Name: Araceli Argueta  Date:2018  : 1960  [x]  Patient  Verified  Payor: Shayy Conception / Plan: Bear Valdez / Product Type: HMO /    In time:11:00  Out time:11:40  Total Treatment Time (min): 40  Visit #: 9 of 12    Treatment Area: Right knee pain [M25.561]    SUBJECTIVE  Pain Level (0-10 scale): 3/10  Any medication changes, allergies to medications, adverse drug reactions, diagnosis change, or new procedure performed?: [x] No    [] Yes (see summary sheet for update)  Subjective functional status/changes:   [] No changes reported  Pt reports not feeling well today. Pt states she had trouble sleeping due to knee pain. OBJECTIVE    Modality rationale: decrease inflammation and decrease pain to improve the patients ability to perform ADLs with less difficulty.    Min Type Additional Details    [] Estim:  []Unatt       []IFC  []Premod                        []Other:  []w/ice   []w/heat  Position:  Location:    [] Estim: []Att    []TENS instruct  []NMES                    []Other:  []w/US   []w/ice   []w/heat  Position:  Location:    []  Traction: [] Cervical       []Lumbar                       [] Prone          []Supine                       []Intermittent   []Continuous Lbs:  [] before manual  [] after manual    []  Ultrasound: []Continuous   [] Pulsed                           []1MHz   []3MHz W/cm2:  Location:    []  Iontophoresis with dexamethasone         Location: [] Take home patch   [] In clinic   10 [x]  Ice     []  heat  []  Ice massage  []  Laser   []  Anodyne Position:reclined  Location:right knee    []  Laser with stim  []  Other:  Position:  Location:    []  Vasopneumatic Device Pressure:       [] lo [] med [] hi   Temperature: [] lo [] med [] hi   [x] Skin assessment post-treatment:  [x]intact [x]redness- no adverse reaction    []redness  adverse reaction:   20 min Therapeutic Exercise:  [] See flow sheet :   Rationale: increase ROM and increase strength to improve the patients ability to perform ADLs with less difficulty. 10 min Manual Therapy:  STM to right quads in supine position. Rationale: decrease pain, increase ROM and increase tissue extensibility to perform ADLs with less difficulty. With   [] TE   [] TA   [] neuro   [] other: Patient Education: [x] Review HEP    [] Progressed/Changed HEP based on:   [] positioning   [] body mechanics   [] transfers   [] heat/ice application    [] other:      Other Objective/Functional Measures:      Pain Level (0-10 scale) post treatment: 2-3/10    ASSESSMENT/Changes in Function: Held ex. Per flow sheet due to pt. Not feeling well. Pt reported decreased pain after manual and modalities. Patient will continue to benefit from skilled PT services to modify and progress therapeutic interventions, address functional mobility deficits, address ROM deficits, address strength deficits, analyze and address soft tissue restrictions and analyze and cue movement patterns to attain remaining goals. []  See Plan of Care  []  See progress note/recertification  []  See Discharge Summary         Progress towards goals / Updated goals:  1. Patient's pain level will be 1-2/10 with activity in order to improve patient's ability to perform normal ADLs. Eval:  6-7/10  Current:  2/10 - 8/10. 4/13/18  2. Patient will demonstrate 0-120 degrees AROM right knee to increase ease of ADLs. Eval:  12 - 85 degrees AROM  Current:  7-106 degrees.  4/17/18  3. Patient will increase FOTO > 20 pts (to 61) to increase functional mobility  Eval: 43.  4/17/18. 4. Patient will ascend and descend steps with reciprocal pattern to increase ease of entry into home. Eval:  Doing steps one at a time. Current:  Doing steps reciprocally with hand rail and pain.  4/13/18       PLAN  [x]  Upgrade activities as tolerated     [x]  Continue plan of care  []  Update interventions per flow sheet       []  Discharge due to:_  [] Other:_      Kami Gamble, PTA 4/27/2018  11:05 AM    No future appointments.

## 2018-05-17 ENCOUNTER — HOSPITAL ENCOUNTER (OUTPATIENT)
Dept: PHYSICAL THERAPY | Age: 58
Discharge: HOME | End: 2018-05-17
Payer: COMMERCIAL

## 2018-05-17 PROCEDURE — 97140 MANUAL THERAPY 1/> REGIONS: CPT

## 2018-05-17 PROCEDURE — 97110 THERAPEUTIC EXERCISES: CPT

## 2018-05-17 NOTE — PROGRESS NOTES
PT DAILY TREATMENT NOTE     Patient Name: Lukasz Wolf  Date:2018  : 1960  [x]  Patient  Verified  Payor: Douglas Giles / Plan: Bandar Covarrubias / Product Type: HMO /    In time:3:30  Out time:4:25  Total Treatment Time (min): 55  Visit #: 10 of 12    Treatment Area: Right knee pain [M25.561]    SUBJECTIVE  Pain Level (0-10 scale): 10  Any medication changes, allergies to medications, adverse drug reactions, diagnosis change, or new procedure performed?: [x] No    [] Yes (see summary sheet for update)  Subjective functional status/changes:   [x] No changes reported      OBJECTIVE    Modality rationale: decrease inflammation and decrease pain to improve the patients ability to perform ADLs with less difficulty.    Min Type Additional Details    [] Estim:  []Unatt       []IFC  []Premod                        []Other:  []w/ice   []w/heat  Position:  Location:    [] Estim: []Att    []TENS instruct  []NMES                    []Other:  []w/US   []w/ice   []w/heat  Position:  Location:    []  Traction: [] Cervical       []Lumbar                       [] Prone          []Supine                       []Intermittent   []Continuous Lbs:  [] before manual  [] after manual    []  Ultrasound: []Continuous   [] Pulsed                           []1MHz   []3MHz W/cm2:  Location:    []  Iontophoresis with dexamethasone         Location: [] Take home patch   [] In clinic   10 [x]  Ice     []  heat  []  Ice massage  []  Laser   []  Anodyne Position: reclined  Location:right knee    []  Laser with stim  []  Other:  Position:  Location:    []  Vasopneumatic Device Pressure:       [] lo [] med [] hi   Temperature: [] lo [] med [] hi   [x] Skin assessment post-treatment:  [x]intact [x]redness- no adverse reaction    []redness  adverse reaction:   35 min Therapeutic Exercise:  [] See flow sheet :   Rationale: increase ROM and increase strength to improve the patients ability to perform ADLs with less difficulty. 10 min Manual Therapy:  Ant/post tibiofemoral grade 2 joint mobs. Rationale: decrease pain, increase ROM and increase tissue extensibility to perform ADls with less difficulty. With   [] TE   [] TA   [] neuro   [] other: Patient Education: [x] Review HEP    [] Progressed/Changed HEP based on:   [] positioning   [] body mechanics   [] transfers   [] heat/ice application    [] other:      Other Objective/Functional Measures: Functional Gains: ROM, stamina, duration to weight bear  Functional Deficits: ROM, stamina  % improvement: 25%  Pain   Average: 2-3/10       Best: 2/10     Worst: 6-7/10  Patient Goal: \"Total bend back\"     Pain Level (0-10 scale) post treatment: 2/10    ASSESSMENT/Changes in Function: Continued with current ex. Per flow sheet. Pt reported no increase in p! During or after therapy. Patient will continue to benefit from skilled PT services to modify and progress therapeutic interventions, address functional mobility deficits, address ROM deficits, address strength deficits, analyze and address soft tissue restrictions and analyze and cue movement patterns to attain remaining goals. []  See Plan of Care  []  See progress note/recertification  []  See Discharge Summary         Progress towards goals / Updated goals:  1. Patient's pain level will be 1-2/10 with activity in order to improve patient's ability to perform normal ADLs. Eval:  6-7/10  Current:  2-3/10. 5/17/18  2. Patient will demonstrate 0-120 degrees AROM right knee to increase ease of ADLs. Eval:  12 - 85 degrees AROM  Current:  Progressing. 7-105 degrees.  5/17/18  3. Patient will increase FOTO > 20 pts (to 61) to increase functional mobility  Eval: 43. Current: Progressing. FOTO 43.  4. Patient will ascend and descend steps with reciprocal pattern to increase ease of entry into home. Eval:  Doing steps one at a time. Current:  MET. Doing steps reciprocally with hand rail and pain.  5/17/18    PLAN  [x] Upgrade activities as tolerated     [x]  Continue plan of care  []  Update interventions per flow sheet       []  Discharge due to:_  [x]  Other:_  PN 2xs a week 5 weeks    Katerina Buckley, NATA 5/17/2018  3:37 PM    Future Appointments  Date Time Provider Asael Solano   5/21/2018 4:00 PM Mary Lou Officer, PT [facility]   5/24/2018 4:00 PM Mary Lou Officer, PT [facility]   5/29/2018 4:30 PM Mary Lou Officer, PT [facility]   5/31/2018 4:00 PM Mary Lou Officer, PT [facility]   6/4/2018 4:00 PM Mary Lou Officer, PT [facility]   6/7/2018 4:00 PM Mary Lou Officer, PT [facility]

## 2018-05-21 ENCOUNTER — HOSPITAL ENCOUNTER (OUTPATIENT)
Dept: PHYSICAL THERAPY | Age: 58
Discharge: HOME | End: 2018-05-21
Payer: COMMERCIAL

## 2018-05-21 PROCEDURE — 97140 MANUAL THERAPY 1/> REGIONS: CPT | Performed by: PHYSICAL THERAPIST

## 2018-05-21 PROCEDURE — 97110 THERAPEUTIC EXERCISES: CPT | Performed by: PHYSICAL THERAPIST

## 2018-05-24 ENCOUNTER — HOSPITAL ENCOUNTER (OUTPATIENT)
Dept: PHYSICAL THERAPY | Age: 58
Discharge: HOME | End: 2018-05-24
Payer: COMMERCIAL

## 2018-05-24 PROCEDURE — 97140 MANUAL THERAPY 1/> REGIONS: CPT | Performed by: PHYSICAL THERAPIST

## 2018-05-24 PROCEDURE — 97110 THERAPEUTIC EXERCISES: CPT | Performed by: PHYSICAL THERAPIST

## 2018-05-24 NOTE — PROGRESS NOTES
PT DAILY TREATMENT NOTE     Patient Name: Milind Loredo  Date:2018  : 1960  [x]  Patient  Verified  Payor: Jeff Bam / Plan: Giovana Kolb / Product Type: HMO /    In time:4:00  Out time:5:05  Total Treatment Time (min): 65  Visit #: 12 of 12    Treatment Area: Right knee pain [M25.561]    SUBJECTIVE  Pain Level (0-10 scale): 0/10  Any medication changes, allergies to medications, adverse drug reactions, diagnosis change, or new procedure performed?: [x] No    [] Yes (see summary sheet for update)  Subjective functional status/changes:   [] No changes reported  Patient reports she has intermittent right knee pain. She notes pain with passive stretching into flexion and extension. OBJECTIVE    Modality rationale: decrease edema, decrease inflammation and decrease pain to improve the patients ability to increase ease of motion to improve function.    Min Type Additional Details    [] Estim:  []Unatt       []IFC  []Premod                        []Other:  []w/ice   []w/heat  Position:  Location:    [] Estim: []Att    []TENS instruct  []NMES                    []Other:  []w/US   []w/ice   []w/heat  Position:  Location:    []  Traction: [] Cervical       []Lumbar                       [] Prone          []Supine                       []Intermittent   []Continuous Lbs:  [] before manual  [] after manual    []  Ultrasound: []Continuous   [] Pulsed                           []1MHz   []3MHz W/cm2:  Location:    []  Iontophoresis with dexamethasone         Location: [] Take home patch   [] In clinic   10 [x]  Ice     []  heat  []  Ice massage  []  Laser   []  Anodyne Position: supine  Location: right knee    []  Laser with stim  []  Other:  Position:  Location:    []  Vasopneumatic Device Pressure:       [] lo [] med [] hi   Temperature: [] lo [] med [] hi   [] Skin assessment post-treatment:  []intact []redness- no adverse reaction    []redness  adverse reaction:     45 min Therapeutic Exercise:  [] See flow sheet :   Rationale: increase ROM, increase strength and improve coordination to improve the patients ability to increase patient's functional activity level. 10 min Manual Therapy:  Manual stretching to increase knee flex and ext. Scar massage. Rationale: increase ROM and increase tissue extensibility to increase ease of motion to improve function. With   [] TE   [] TA   [] neuro   [] other: Patient Education: [x] Review HEP    [] Progressed/Changed HEP based on:   [] positioning   [] body mechanics   [] transfers   [] heat/ice application    [] other:      Other Objective/Functional Measures:   Patient has a capsular end feel to flexion and extension. She has a mild antalgic gait but is FWB and does not use an assistive device. Swelling in her knee is minimal to moderate. Pain Level (0-10 scale) post treatment: 0/10    ASSESSMENT/Changes in Function:   Patient has decreased pain and is moving better. She is ambulating more and notes less restrictions. Patient will continue to benefit from skilled PT services to modify and progress therapeutic interventions, address functional mobility deficits, address ROM deficits, address strength deficits, analyze and address soft tissue restrictions and analyze and cue movement patterns to attain remaining goals. [x]  See Plan of Care  []  See progress note/recertification  []  See Discharge Summary         Progress towards goals / Updated goals:  1. Patient's pain level will be 1-2/10 with activity in order to improve patient's ability to perform normal ADLs. Eval:  6-7/10  Current:  2-3/10. 5/17/18  2. Patient will demonstrate 0-120 degrees AROM right knee to increase ease of ADLs. Eval:  12 - 85 degrees AROM  Current:  Progressing. 7-105 degrees.  5/17/18  3. Patient will increase FOTO > 20 pts (to 61) to increase functional mobility  Eval: 43. Current: Progressing.  FOTO 48.   4. Patient will ascend and descend steps with reciprocal pattern to increase ease of entry into home. Eval:  Doing steps one at a time. Current:  MET. Doing steps reciprocally with hand rail and pain.  5/17/18    PLAN  [x]  Upgrade activities as tolerated     [x]  Continue plan of care  []  Update interventions per flow sheet       []  Discharge due to:_  []  Other:_      Concha Amador, PT 5/24/2018  4:26 PM    Future Appointments  Date Time Provider Asael Solano   5/29/2018 4:30 PM Concha Amador, PT [facility]   5/31/2018 4:00 PM Concha Amador, PT [facility]   6/4/2018 4:00 PM Concha Amador, PT [facility]   6/7/2018 4:00 PM Concha Amador, PT [facility]

## 2018-05-29 ENCOUNTER — HOSPITAL ENCOUNTER (OUTPATIENT)
Dept: PHYSICAL THERAPY | Age: 58
Discharge: HOME | End: 2018-05-29
Payer: COMMERCIAL

## 2018-05-29 PROCEDURE — 97110 THERAPEUTIC EXERCISES: CPT | Performed by: PHYSICAL THERAPIST

## 2018-05-29 PROCEDURE — 97140 MANUAL THERAPY 1/> REGIONS: CPT | Performed by: PHYSICAL THERAPIST

## 2018-05-29 NOTE — PROGRESS NOTES
[facility] at [address] [facility]  [address]  Phone: [phone]      Fax:  [phone]    Progress Note  Patient name: Sammie Espitia Start of Care: 3018   Referral source: Jennifer Ortiz MD : 1960   Medical/Treatment Diagnosis: Right knee pain [M25.561] Onset Date:3/12/18     Prior Hospitalization: see medical history Provider#: [id number]   Medications: Verified on Patient Summary List    Comorbidities: None   Prior Level of Function: Unable to kneel, did steps one at a time, instability upon standing, limited walking due to pain.      Visits from Start of Care: 13    Missed Visits: 0    Established Goals:          Excellent Good         Limited           None  [x] Increased ROM   []  [x]  []  []  [] Increased Strength  []  []  []  []  [x] Increased Mobility  []  [x]  []  []   [x] Decreased Pain   []  [x]  []  []  [] Decreased Swelling  []  []  []  []    Key Functional Changes:   1. Patient's pain level will be 1-2/10 with activity in order to improve patient's ability to perform normal ADLs. Eval:  6-7/10  Current:  0-2/10. MET  2. Patient will demonstrate 0-120 degrees AROM right knee to increase ease of ADLs. Eval:  12 - 85 degrees AROM  Current:  Progressing. 7-115 degrees. [address]  3. Patient will increase FOTO > 20 pts (to 61) to increase functional mobility  Eval: 43. Current: Progressing. FOTO 48. PROGRESSING  4. Patient will ascend and descend steps with reciprocal pattern to increase ease of entry into home. Eval:  Doing steps one at a time. Current:  MET. Updated Goals: to be achieved in 2 weeks:  1. Patient will demonstrate 0-120 degrees AROM right knee to increase ease of ADLs.    2. Patient will increase FOTO > 20 pts (to 61) to increase functional mobility      ASSESSMENT/RECOMMENDATIONS:  [x]Continue therapy per initial plan/protocol at a frequency of  2 x per week for 2 weeks  []Continue therapy with the following recommended changes:_____________________      _____________________________________________________________________  []Discontinue therapy progressing towards or have reached established goals  []Discontinue therapy due to lack of appreciable progress towards goals  []Discontinue therapy due to lack of attendance or compliance  []Await Physician's recommendations/decisions regarding therapy  []Other:________________________________________________________________    Thank you for this referral.   Celso Cartwright, PT 5/29/2018 7:08 PM  NOTE TO PHYSICIAN:  [address]   FAX TO [facility]: [phone]  If you are unable to process this request in 24 hours please contact our office:   [phone]  []  I have read the above report and request that my patient continue as recommended. []  I have read the above report and request that my patient continue therapy with the following changes/special instructions:________________________________________  []I have read the above report and request that my patient be discharged from therapy.     Physicians signature: ______________________________Date: ______Time:______

## 2018-05-29 NOTE — PROGRESS NOTES
PT DAILY TREATMENT NOTE     Patient Name: Jane Guan  Date:2018  : 1960  [x]  Patient  Verified  Payor: Carmen Severance / Plan: Vero Mcginnish / Product Type: HMO /    In time:4:20  Out time:5:25  Total Treatment Time (min): 65  Visit #: 1 of 8    Treatment Area: Right knee pain [M25.561]    SUBJECTIVE  Pain Level (0-10 scale): 1/10  Any medication changes, allergies to medications, adverse drug reactions, diagnosis change, or new procedure performed?: [x] No    [] Yes (see summary sheet for update)  Subjective functional status/changes:   [] No changes reported  Patient notes that her knee is a little bit sore today as she was at work and was standing much of the day. OBJECTIVE    50 min Therapeutic Exercise:  [] See flow sheet :   Rationale: increase ROM, increase strength and improve coordination to improve the patients ability to increase patient's functional activity level. 15 min Manual Therapy:  IASTM to hamstring tendons for pain and improved mobility, manual stretching HS, KTC. Knee mobs to increase flexion. Rationale: decrease pain, increase ROM and increase tissue extensibility to increase ease of motion to improve function. With   [] TE   [] TA   [] neuro   [] other: Patient Education: [x] Review HEP    [] Progressed/Changed HEP based on:   [] positioning   [] body mechanics   [] transfers   [] heat/ice application    [] other:      Other Objective/Functional Measures:   Passive knee flexion 0-115 degrees with capsular end feel. Gait upon entering therapy was antalgic but better after therapy. She has some tenderness to palpation over the medial hamstring tendons. Pain Level (0-10 scale) post treatment: 0/10    ASSESSMENT/Changes in Function: Patient with decreased pain with improved ROM with subjective increases in her functional activity level.     Patient will continue to benefit from skilled PT services to modify and progress therapeutic interventions, address functional mobility deficits, address ROM deficits, address strength deficits, analyze and address soft tissue restrictions and analyze and cue movement patterns to attain remaining goals. [x]  See Plan of Care  []  See progress note/recertification  []  See Discharge Summary         Progress towards goals / Updated goals:  1. Patient's pain level will be 1-2/10 with activity in order to improve patient's ability to perform normal ADLs. Eval:  6-7/10  Current:  0-2/10. 5/29/18. MET  2. Patient will demonstrate 0-120 degrees AROM right knee to increase ease of ADLs. Eval:  12 - 85 degrees AROM  Current:  Progressing. 7-115 degrees.  5/29/18  3. Patient will increase FOTO > 20 pts (to 61) to increase functional mobility  Eval: 43. Current: Progressing. FOTO 48.   4. Patient will ascend and descend steps with reciprocal pattern to increase ease of entry into home. Eval:  Doing steps one at a time. Current:  MET. Doing steps reciprocally with hand rail and pain.  5/17/18    PLAN  [x]  Upgrade activities as tolerated     [x]  Continue plan of care  []  Update interventions per flow sheet       []  Discharge due to:_  []  Other:_      Julissa Lake, PT 5/29/2018  5:36 PM    Future Appointments  Date Time Provider Asael Solano   5/31/2018 4:00 PM Julissa Lake PT [facility]   6/4/2018 4:00 PM Julissa Lake PT [facility]   6/7/2018 4:00 PM Julissa Lake PT [facility]

## 2018-05-31 ENCOUNTER — HOSPITAL ENCOUNTER (OUTPATIENT)
Dept: PHYSICAL THERAPY | Age: 58
Discharge: HOME | End: 2018-05-31
Payer: COMMERCIAL

## 2018-05-31 PROCEDURE — 97140 MANUAL THERAPY 1/> REGIONS: CPT | Performed by: PHYSICAL THERAPIST

## 2018-05-31 PROCEDURE — 97110 THERAPEUTIC EXERCISES: CPT | Performed by: PHYSICAL THERAPIST

## 2018-05-31 NOTE — PROGRESS NOTES
PT DAILY TREATMENT NOTE     Patient Name: Tawana Looney  Date:2018  : 1960  [x]  Patient  Verified  Payor: Konstantin Patel / Plan: Ward Alvarez / Product Type: HMO /    In time:4:00  Out time:4:45  Total Treatment Time (min): 45  Visit #: 2 of 8    Treatment Area: Right knee pain [M25.561]    SUBJECTIVE  Pain Level (0-10 scale): 0/10  Any medication changes, allergies to medications, adverse drug reactions, diagnosis change, or new procedure performed?: [x] No    [] Yes (see summary sheet for update)  Subjective functional status/changes:   [] No changes reported  Patient reports that her knee is feeling much better. She was on her feet quite a bit yesterday but this did not cause any significant problems. She still has difficulty sleeping at night. OBJECTIVE    35 min Therapeutic Exercise:  [] See flow sheet :   Rationale: increase ROM, increase strength and improve coordination to improve the patients ability to increase patient's functional activity level. 10 min Manual Therapy:  Manual stretching HS, Quads, patellar mobilizations. Rationale: increase ROM and increase tissue extensibility to increase ease of motion to improve function. With   [] TE   [] TA   [] neuro   [] other: Patient Education: [x] Review HEP    [] Progressed/Changed HEP based on:   [] positioning   [] body mechanics   [] transfers   [] heat/ice application    [] other:      Other Objective/Functional Measures:   Patient notes pain at the end range of both flexion and extension. She has a capsular end feel to both. Her active flexion is now 117 degrees. Gait is altered due to lack of full extension. Pain Level (0-10 scale) post treatment: 0/10    ASSESSMENT/Changes in Function:   Patient has increased flexion of the right knee. She continues to have decreased extension with a capsular end feel.     Patient will continue to benefit from skilled PT services to modify and progress therapeutic interventions, address functional mobility deficits, address ROM deficits, address strength deficits, analyze and address soft tissue restrictions and analyze and cue movement patterns to attain remaining goals. [x]  See Plan of Care  []  See progress note/recertification  []  See Discharge Summary         Progress towards goals / Updated goals:  1. Patient's pain level will be 1-2/10 with activity in order to improve patient's ability to perform normal ADLs. Eval:  6-7/10  Current:  0-2/10. 5/29/18. MET  2. Patient will demonstrate 0-120 degrees AROM right knee to increase ease of ADLs. Eval:  12 - 85 degrees AROM  Current:  Progressing. 7-115 degrees.  5/29/18  3. Patient will increase FOTO > 20 pts (to 61) to increase functional mobility  Eval: 43. Current: Progressing. FOTO 48.   4. Patient will ascend and descend steps with reciprocal pattern to increase ease of entry into home. Eval:  Doing steps one at a time. Current:  MET. Doing steps reciprocally with hand rail and pain.  5/17/18    PLAN  [x]  Upgrade activities as tolerated     [x]  Continue plan of care  []  Update interventions per flow sheet       []  Discharge due to:_  []  Other:_      Viola Harmon, PT 5/31/2018  4:13 PM    Future Appointments  Date Time Provider Asael Solano   6/4/2018 4:00 PM Viola Harmon, PT [facility]   6/7/2018 4:00 PM Alex Hoang

## 2018-06-04 ENCOUNTER — HOSPITAL ENCOUNTER (OUTPATIENT)
Dept: PHYSICAL THERAPY | Age: 58
Discharge: HOME | End: 2018-06-04
Payer: COMMERCIAL

## 2018-06-04 PROCEDURE — 97110 THERAPEUTIC EXERCISES: CPT | Performed by: PHYSICAL THERAPIST

## 2018-06-04 PROCEDURE — 97140 MANUAL THERAPY 1/> REGIONS: CPT | Performed by: PHYSICAL THERAPIST

## 2018-06-05 NOTE — PROGRESS NOTES
PT DAILY TREATMENT NOTE     Patient Name: Marie Delaney  Date:2018  : 1960  [x]  Patient  Verified  Payor: Daysi Rob / Plan: Meche Galeano / Product Type: HMO /    In time:4:00  Out time:5:05  Total Treatment Time (min): 65  Visit #: 3 of 8    Treatment Area: Right knee pain [M25.561]    SUBJECTIVE  Pain Level (0-10 scale): 310  Any medication changes, allergies to medications, adverse drug reactions, diagnosis change, or new procedure performed?: [x] No    [] Yes (see summary sheet for update)  Subjective functional status/changes:   [] No changes reported  Patient reports that she was very active over the weekend. She did some walking on a beach and attended a music festival.  Today she c/o increased right knee pain medial to anterior. OBJECTIVE    Modality rationale: decrease inflammation and decrease pain to improve the patients ability to increase ease of motion to improve function.    Min Type Additional Details    [] Estim:  []Unatt       []IFC  []Premod                        []Other:  []w/ice   []w/heat  Position:  Location:    [] Estim: []Att    []TENS instruct  []NMES                    []Other:  []w/US   []w/ice   []w/heat  Position:  Location:    []  Traction: [] Cervical       []Lumbar                       [] Prone          []Supine                       []Intermittent   []Continuous Lbs:  [] before manual  [] after manual    []  Ultrasound: []Continuous   [] Pulsed                           []1MHz   []3MHz W/cm2:  Location:    []  Iontophoresis with dexamethasone         Location: [] Take home patch   [] In clinic   10 [x]  Ice     []  heat  []  Ice massage  []  Laser   []  Anodyne Position: long sitting  Location: right knee.    []  Laser with stim  []  Other:  Position:  Location:    []  Vasopneumatic Device Pressure:       [] lo [] med [] hi   Temperature: [] lo [] med [] hi   [] Skin assessment post-treatment:  []intact []redness- no adverse reaction    []redness  adverse reaction:     45 min Therapeutic Exercise:  [] See flow sheet :   Rationale: increase ROM, increase strength and improve coordination to improve the patients ability to increase patient's functional activity level. 10 min Manual Therapy:  Manual stretching HS/Quads to increase flex/ext and patellar mobs. Rationale: increase ROM and increase tissue extensibility to increase ease of motion to improve function. With   [] TE   [] TA   [] neuro   [] other: Patient Education: [x] Review HEP    [] Progressed/Changed HEP based on:   [] positioning   [] body mechanics   [] transfers   [] heat/ice application    [] other:      Other Objective/Functional Measures:   Gait is mildly antalgic. She has pain at end range of flexion and extension. There is some tenderness over the patellar tendon. Pain Level (0-10 scale) post treatment: 1/10    ASSESSMENT/Changes in Function:   Patient has continued improvement in her function but still has pain with limited ROM. Patient will continue to benefit from skilled PT services to modify and progress therapeutic interventions, address functional mobility deficits, address ROM deficits, address strength deficits, analyze and address soft tissue restrictions and analyze and cue movement patterns to attain remaining goals. [x]  See Plan of Care  []  See progress note/recertification  []  See Discharge Summary         Progress towards goals / Updated goals:  1. Patient's pain level will be 1-2/10 with activity in order to improve patient's ability to perform normal ADLs. Eval:  6-7/10  Current:  0-3/10. 6/4/18.  MET  2. Patient will demonstrate 0-120 degrees AROM right knee to increase ease of ADLs. Eval:  12 - 85 degrees AROM  Current:  Progressing. 7-115 degrees.  5/29/18  3. Patient will increase FOTO > 20 pts (to 61) to increase functional mobility  Eval: 43. Current: Progressing.  FOTO 48.   4. Patient will ascend and descend steps with reciprocal pattern to increase ease of entry into home. Eval:  Doing steps one at a time. Current:  MET.  Doing steps reciprocally with hand rail and pain.  5/17/18    PLAN  [x]  Upgrade activities as tolerated     [x]  Continue plan of care  []  Update interventions per flow sheet       []  Discharge due to:_  []  Other:_      Christal Mares, PT 6/5/2018  8:33 AM    Future Appointments  Date Time Provider Asael Solano   6/7/2018 4:00 PM Meredith Justice

## 2018-06-07 ENCOUNTER — HOSPITAL ENCOUNTER (OUTPATIENT)
Dept: PHYSICAL THERAPY | Age: 58
Discharge: HOME | End: 2018-06-07
Payer: COMMERCIAL

## 2018-06-07 PROCEDURE — 97140 MANUAL THERAPY 1/> REGIONS: CPT | Performed by: PHYSICAL THERAPIST

## 2018-06-07 PROCEDURE — 97110 THERAPEUTIC EXERCISES: CPT | Performed by: PHYSICAL THERAPIST

## 2018-06-07 NOTE — PROGRESS NOTES
PT DAILY TREATMENT NOTE     Patient Name: Silvia Klein  Date:2018  : 1960  [x]  Patient  Verified  Payor: Darwin Last / Plan: Ron Hobson / Product Type: HMO /    In time:3:35  Out time:4:40  Total Treatment Time (min): 65  Visit #: 4 of 8    Treatment Area: Right knee pain [M25.561]    SUBJECTIVE  Pain Level (0-10 scale): 1/10  Any medication changes, allergies to medications, adverse drug reactions, diagnosis change, or new procedure performed?: [x] No    [] Yes (see summary sheet for update)  Subjective functional status/changes:   [] No changes reported  Patient reports she has had a \"bad week\"    OBJECTIVE    Modality rationale: decrease inflammation and decrease pain to improve the patients ability to increase ease of motion to improve function.    Min Type Additional Details    [] Estim:  []Unatt       []IFC  []Premod                        []Other:  []w/ice   []w/heat  Position:  Location:    [] Estim: []Att    []TENS instruct  []NMES                    []Other:  []w/US   []w/ice   []w/heat  Position:  Location:    []  Traction: [] Cervical       []Lumbar                       [] Prone          []Supine                       []Intermittent   []Continuous Lbs:  [] before manual  [] after manual    []  Ultrasound: []Continuous   [] Pulsed                           []1MHz   []3MHz W/cm2:  Location:    []  Iontophoresis with dexamethasone         Location: [] Take home patch   [] In clinic   10 [x]  Ice     []  heat  []  Ice massage  []  Laser   []  Anodyne Position: long sitting  Location:right knee    []  Laser with stim  []  Other:  Position:  Location:    []  Vasopneumatic Device Pressure:       [] lo [] med [] hi   Temperature: [] lo [] med [] hi   [] Skin assessment post-treatment:  []intact []redness- no adverse reaction    []redness  adverse reaction:     45 min Therapeutic Exercise:  [] See flow sheet :   Rationale: increase ROM, increase strength and improve coordination to improve the patients ability to increase patient's functional activity level. 10 min Manual Therapy:  Manual stretching HS, Quads; Patellar mobs, scar massage. Rationale: increase ROM and increase tissue extensibility to increase ease of motion to improve function. With   [] TE   [] TA   [] neuro   [] other: Patient Education: [x] Review HEP    [] Progressed/Changed HEP based on:   [] positioning   [] body mechanics   [] transfers   [] heat/ice application    [] other:      Other Objective/Functional Measures:   Patient ambulates with an antalgic gait. She has pain at end range of motion which results in active guarding. Pain Level (0-10 scale) post treatment: 0-1/10    ASSESSMENT/Changes in Function:   Patient with continued pain and altered gait with limited ROM almost 3 months post op right TKA. Patient will continue to benefit from skilled PT services to modify and progress therapeutic interventions, address functional mobility deficits, address ROM deficits, address strength deficits, analyze and address soft tissue restrictions and analyze and cue movement patterns to attain remaining goals. []  See Plan of Care  []  See progress note/recertification  []  See Discharge Summary         Progress towards goals / Updated goals:  1. Patient's pain level will be 1-2/10 with activity in order to improve patient's ability to perform normal ADLs. Eval:  6-7/10  Current:  0-3/10. 6/4/18.  MET  2. Patient will demonstrate 0-120 degrees AROM right knee to increase ease of ADLs. Eval:  12 - 85 degrees AROM  Current:  Progressing. 7-115 degrees.  5/29/18  3. Patient will increase FOTO > 20 pts (to 61) to increase functional mobility  Eval: 43. Current: Progressing. FOTO 48.   4. Patient will ascend and descend steps with reciprocal pattern to increase ease of entry into home. Eval:  Doing steps one at a time. Current:  MET.  Doing steps reciprocally with hand rail and pain. 5/17/18     PLAN  [x]  Upgrade activities as tolerated     [x]  Continue plan of care  []  Update interventions per flow sheet       []  Discharge due to:_  []  Other:_      Guerrero Maxwell PT 6/7/2018  4:42 PM    Future Appointments  Date Time Provider Asael Solano   6/12/2018 4:00 PM Guerrero Maxwell, PT [facility]   6/14/2018 4:00 PM Guerrero Maxwell, PT [facility]   6/19/2018 4:00 PM Guerrero Maxwell, PT [facility]   6/21/2018 4:00 PM Guerrero Maxwell, PT [facility]

## 2018-06-11 ENCOUNTER — APPOINTMENT (OUTPATIENT)
Dept: PHYSICAL THERAPY | Age: 58
End: 2018-06-11
Payer: COMMERCIAL

## 2018-06-12 ENCOUNTER — APPOINTMENT (OUTPATIENT)
Dept: PHYSICAL THERAPY | Age: 58
End: 2018-06-12
Payer: COMMERCIAL

## 2018-06-12 ENCOUNTER — TELEPHONE (OUTPATIENT)
Dept: INTERNAL MEDICINE CLINIC | Age: 58
End: 2018-06-12

## 2018-06-12 DIAGNOSIS — G47.00 INSOMNIA, UNSPECIFIED TYPE: Primary | ICD-10-CM

## 2018-06-12 NOTE — TELEPHONE ENCOUNTER
Dr. Ramos Quiroga,   I had knee replacement surgery (March 12th).  Im doing great with the exception re: sleeping.  I cant sleep - I think because I cant get comfortable. I tried an Ambien - and I slept for 8 hours (the most sleep that I have had in months). The lack of sleep is greatly affecting my ability to work, think, etc... Dr. Ashlie Coronado office stated that they dont prescribe sleeping aids; and I should contact my primary care physician. Can you prescribe me a sleeping aid? Thanks.

## 2018-06-13 RX ORDER — ZOLPIDEM TARTRATE 10 MG/1
10 TABLET ORAL
Qty: 30 TAB | Refills: 1 | OUTPATIENT
Start: 2018-06-13 | End: 2018-06-22 | Stop reason: SDUPTHER

## 2018-06-14 ENCOUNTER — HOSPITAL ENCOUNTER (OUTPATIENT)
Dept: PHYSICAL THERAPY | Age: 58
Discharge: HOME | End: 2018-06-14
Payer: COMMERCIAL

## 2018-06-14 PROCEDURE — 97110 THERAPEUTIC EXERCISES: CPT | Performed by: PHYSICAL THERAPIST

## 2018-06-14 PROCEDURE — 97140 MANUAL THERAPY 1/> REGIONS: CPT | Performed by: PHYSICAL THERAPIST

## 2018-06-14 NOTE — PROGRESS NOTES
PT DAILY TREATMENT NOTE     Patient Name: Marie Delaney  Date:2018  : 1960  [x]  Patient  Verified  Payor: Daysi Rob / Plan: Meche Galeano / Product Type: HMO /    In time:4:00  Out time:5:05  Total Treatment Time (min): 65  1 on 1 treatment time (min): 30  Visit #: 5 of 8    Treatment Area: Right knee pain [M25.561]    SUBJECTIVE  Pain Level (0-10 scale): 1/10  Any medication changes, allergies to medications, adverse drug reactions, diagnosis change, or new procedure performed?: [x] No    [] Yes (see summary sheet for update)  Subjective functional status/changes:   [] No changes reported  Patient reports she is doing \"much better\". States very little problem on stairs. OBJECTIVE    Modality rationale: decrease edema, decrease inflammation and decrease pain to improve the patients ability to increase ease of motion to improve function.    Min Type Additional Details    [] Estim:  []Unatt       []IFC  []Premod                        []Other:  []w/ice   []w/heat  Position:  Location:    [] Estim: []Att    []TENS instruct  []NMES                    []Other:  []w/US   []w/ice   []w/heat  Position:  Location:    []  Traction: [] Cervical       []Lumbar                       [] Prone          []Supine                       []Intermittent   []Continuous Lbs:  [] before manual  [] after manual    []  Ultrasound: []Continuous   [] Pulsed                           []1MHz   []3MHz W/cm2:  Location:    []  Iontophoresis with dexamethasone         Location: [] Take home patch   [] In clinic   10 [x]  Ice     []  heat  []  Ice massage  []  Laser   []  Anodyne Position:  Location:    []  Laser with stim  []  Other:  Position:  Location:    []  Vasopneumatic Device Pressure:       [] lo [] med [] hi   Temperature: [] lo [] med [] hi   [] Skin assessment post-treatment:  []intact []redness- no adverse reaction    []redness  adverse reaction:     45 min Therapeutic Exercise:  [] See flow sheet : Rationale: increase ROM and increase strength to improve the patients ability to increase patient's functional activity level. 10 min Manual Therapy:  Manual stretching HS, Quads to increase flexion and extension   Rationale: increase ROM and increase tissue extensibility to increase ease of motion to improve function. With   [] TE   [] TA   [] neuro   [] other: Patient Education: [x] Review HEP    [] Progressed/Changed HEP based on:   [] positioning   [] body mechanics   [] transfers   [] heat/ice application    [] other:      Other Objective/Functional Measures:   Patient ambulates with a mild antalgic gait. She has pain at end range of flexion and extension. Pain Level (0-10 scale) post treatment: 0/10    ASSESSMENT/Changes in Function:   Patient with continued right knee pain and altered gait. Patient will continue to benefit from skilled PT services to modify and progress therapeutic interventions, address functional mobility deficits, address ROM deficits, address strength deficits, analyze and address soft tissue restrictions and analyze and cue movement patterns to attain remaining goals. [x]  See Plan of Care  []  See progress note/recertification  []  See Discharge Summary         Progress towards goals / Updated goals:  1. Patient's pain level will be 1-2/10 with activity in order to improve patient's ability to perform normal ADLs. Eval:  6-7/10  Current:  0-1/10. 6/14/18.  MET  2. Patient will demonstrate 0-120 degrees AROM right knee to increase ease of ADLs. Eval:  12 - 85 degrees AROM  Current:  Progressing. 7-115 degrees.  5/29/18  3. Patient will increase FOTO > 20 pts (to 61) to increase functional mobility  Eval: 43. Current: Progressing. FOTO 55. 6/4/18  4. Patient will ascend and descend steps with reciprocal pattern to increase ease of entry into home. Eval:  Doing steps one at a time. Current:  MET.  Doing steps reciprocally, much easier now. 6/14/18    PLAN  [x]  Upgrade activities as tolerated     [x]  Continue plan of care  []  Update interventions per flow sheet       []  Discharge due to:_  []  Other:_      Virgilio Gamez PT 6/14/2018  4:57 PM    Future Appointments  Date Time Provider Asael Solano   6/18/2018 4:00 PM Virgilio Gamez, PT [facility]   6/21/2018 4:00 PM Virgilio Gamez PT [facility]   6/22/2018 9:20 AM Jorden Hernandez MD [facility]

## 2018-06-18 ENCOUNTER — HOSPITAL ENCOUNTER (OUTPATIENT)
Dept: PHYSICAL THERAPY | Age: 58
Discharge: HOME | End: 2018-06-18
Payer: COMMERCIAL

## 2018-06-18 PROCEDURE — 97140 MANUAL THERAPY 1/> REGIONS: CPT | Performed by: PHYSICAL THERAPIST

## 2018-06-18 PROCEDURE — 97110 THERAPEUTIC EXERCISES: CPT | Performed by: PHYSICAL THERAPIST

## 2018-06-18 NOTE — PROGRESS NOTES
PT DAILY TREATMENT NOTE     Patient Name: Adam Archuleta  SCRB:  : 1960  [x]  Patient  Verified  Payor: Carolina Stacy / Plan: Shearereli Orozco / Product Type: HMO /    In time:4:00  Out time:4:40  Total Treatment Time (min): 40   1 on 1 treatment time (min): 30  Visit #: 6 of 8    Treatment Area: Right knee pain [M25.561]    SUBJECTIVE  Pain Level (0-10 scale): 0/10  Any medication changes, allergies to medications, adverse drug reactions, diagnosis change, or new procedure performed?: [x] No    [] Yes (see summary sheet for update)  Subjective functional status/changes:   [] No changes reported  Patient reports she has no pain today but does note that she has intermittent right knee pain. OBJECTIVE    30 min Therapeutic Exercise:  [] See flow sheet :   Rationale: increase ROM, increase strength and improve coordination to improve the patients ability to increase patient's functional activity level. 10 min Manual Therapy:  Patellar mobs, manual stretching HS and Quads. Rationale: increase ROM and increase tissue extensibility to increase ease of motion to improve function. With   [] TE   [] TA   [] neuro   [] other: Patient Education: [x] Review HEP    [] Progressed/Changed HEP based on:   [] positioning   [] body mechanics   [] transfers   [] heat/ice application    [] other:      Other Objective/Functional Measures:   Mild antalgic gait noted. She is FWB and no assistive device. PROM 7-115 degrees with capsular end feel to flexion. She has pain at the end range of flexion. Pain Level (0-10 scale) post treatment: 0/10    ASSESSMENT/Changes in Function:   Patient is moving better with improved ambulation, she is doing much better on stairs with minimal pain when doing steps reciprocally. She has no pain with normal activity.     Patient will continue to benefit from skilled PT services to modify and progress therapeutic interventions, address functional mobility deficits, address ROM deficits, address strength deficits, analyze and address soft tissue restrictions and analyze and cue movement patterns to attain remaining goals. [x]  See Plan of Care  []  See progress note/recertification  []  See Discharge Summary         Progress towards goals / Updated goals:  1. Patient's pain level will be 1-2/10 with activity in order to improve patient's ability to perform normal ADLs. Eval:  6-7/10  Current:  0-1/10. 6/14/18.  MET  2. Patient will demonstrate 0-120 degrees AROM right knee to increase ease of ADLs. Eval:  12 - 85 degrees AROM  Current:  Progressing. 7-115 degrees.  6/14/18  3. Patient will increase FOTO > 20 pts (to 61) to increase functional mobility  Eval: 43. Current: Progressing. FOTO 55. 6/4/18  4. Patient will ascend and descend steps with reciprocal pattern to increase ease of entry into home. Eval:  Doing steps one at a time. Current:  MET.  Doing steps reciprocally, much easier now.   6/14/18    PLAN  [x]  Upgrade activities as tolerated     [x]  Continue plan of care  []  Update interventions per flow sheet       []  Discharge due to:_  []  Other:_      Arthur Diaz PT 6/18/2018  4:15 PM    Future Appointments  Date Time Provider Asael Solano   6/21/2018 4:00 PM Arthur Diaz, PT [facility]   6/22/2018 9:20 AM Morenita Weller MD [address]

## 2018-06-19 ENCOUNTER — APPOINTMENT (OUTPATIENT)
Dept: PHYSICAL THERAPY | Age: 58
End: 2018-06-19
Payer: COMMERCIAL

## 2018-06-19 NOTE — PROGRESS NOTES
61 y.o. WHITE OR  female who presents for f/u    No cardiovascular complaints. She is trying to get back to her exercise program after the RIGHT TKR but nowehere close and she reports that Dr Ramu Askew was trying to get the left one done although she's deferred for now    She was dx'ed w h pylori gastritis last year, still has occasional sx and is going to call Dr Jose Alfredo Douglas in the near future. No alarm complaints    She's off the qsymia. Weight went up postop but she's trying to manage with lean cuisine type approach.     The Sjogren's is being tx'ed w evoxac, Dr. Leopoldo Seamen is currently not following her    Asking for ambien to help with sleep, under some stress    IF 6/18    Past Medical History:   Diagnosis Date    Allergic rhinitis Dr. Jovan Rodriguez 2011     Colon adenoma 08/2016    Dr Sandra Olivera 2007, 2011, 8/16    DJD (degenerative joint disease)     B knees Dr. Isela Alas FHx: heart disease     Gastritis 07/2017    h pylori+ Dr Axel Banegas GERD (gastroesophageal reflux disease) 2007    [facility]    Hyperlipidemia     calculated risk score 1.9% (2/13); elected to take statins due to Fhx, elev hscrp/ldl-p but intol of 4 diff ones    Hypertension     resolved    Obesity (BMI 30.0-34.9)     peak weight 189 lbs, bmi 31.5 from 12/16; IF 6/18    Overweight (BMI 25.0-29.9)     saw Dr Caitie Pablo; qsymia ineffective 6/14-6/15    Sjogren's disease, probable Dr. Leopoldo Seamen 8/12     Past Surgical History:   Procedure Laterality Date    HX APPENDECTOMY  1980s    HX BREAST BIOPSY      right   Mychal Brunette CHOLECYSTECTOMY  1980s    HX COLONOSCOPY      [facility] 2007, Dr. Roxana Olivera 2011, 8/16 adenoma    HX ORTHOPAEDIC      RIGHT TKR Dr Ramu Askew 2017    HX TUBAL LIGATION       Social History     Social History    Marital status: SINGLE     Spouse name: N/A    Number of children: 3    Years of education: N/A     Occupational History     [place]      Social History Main Topics    Smoking status: Former Smoker     Packs/day: 0.50    Smokeless tobacco: Never Used    Alcohol use 0.6 oz/week     1 Glasses of wine per week    Drug use: No    Sexual activity: Not on file     Other Topics Concern    Not on file     Social History Narrative     Current Outpatient Prescriptions   Medication Sig    zolpidem (AMBIEN) 10 mg tablet Take 1 Tab by mouth nightly as needed for Sleep. Max Daily Amount: 10 mg.  pantoprazole (PROTONIX) 40 mg tablet take 1 tablet by mouth once daily (Patient taking differently: daily. take 1 tablet by mouth once daily  Indications: gastroesophageal reflux disease)    cevimeline (EVOXAC) 30 mg capsule take 1 capsule by mouth three times a day (Patient taking differently: 60 mg every morning. take 1 capsule by mouth three times a day  Indications: XEROSTOMIA SECONDARY TO SJOGREN'S SYNDROME)     No current facility-administered medications for this visit.       Allergies   Allergen Reactions    Benazepril Cough    Chantix [Varenicline] Nausea and Vomiting    Codeine Nausea and Vomiting and Other (comments)     Abdominal Pain    Crestor [Rosuvastatin] Myalgia    Lipitor [Atorvastatin] Myalgia    Pravastatin Myalgia    Prevacid [Lansoprazole] Other (comments)     Constipation    Zocor [Simvastatin] Myalgia     REVIEW OF SYSTEMS:  gyn 2017 Dr Kenia Day, [address] [facility] 4/17, [place] 8/16 Dr Obie Mercado  no vision change or eye pain  Oral  no mouth pain, tongue or tooth problems  Ears  no hearing loss, ear pain, fullness, no swallowing problems  Cardiac  no CP, PND, orthopnea, edema, palpitations or syncope  Chest  no breast masses  Resp  no wheezing, chronic coughing, dyspnea  Neuro  no focal weakness, numbness, paresthesias, incoordination, ataxia, involuntary movements  Endo - no polyuria, polydipsia, nocturia, hot flashes    Visit Vitals    /80    Pulse 91    Temp 99.1 °F (37.3 °C) (Oral)    Resp 14    Ht 5' 5\" (1.651 m)    Wt 183 lb (83 kg)    SpO2 96%    BMI 30.45 kg/m2 Affect is appropriate. Mood stable  No apparent distress  HEENT --Anicteric sclerae. No thyromegaly, JVD, or bruits. Lungs --Clear to auscultation, normal percussion. Heart --Regular rate and rhythm, no murmurs, rubs, gallops, or clicks. Chest wall --Nontender to palpation. PMI normal.  Abdomen -- Soft and nontender, no hepatosplenomegaly or masses. Extremities -- Without cyanosis, clubbing, edema. 2+ pulses equally and bilaterally.     LABS  From 10/11 showed gluc 92,   cr 0.91, gfr 73, alt 16,         ldl-p 1793, chol 221, tg 99,   hdl 65, ldl-c 136, wbc 6.1, hb 14.5, plt 249, jim neg, vit d 37.1, esr 5  From 2/13 showed   gluc 95,   cr 0.82, gfr 82, alt <5                                   chol 220, tg 117, hdl 53, ldl-c 144  From 4/13 showed   gluc 85,   cr 0.70,            alt 15, hba1c 5.0, ldl-p 1372, chol 163, tg 68,    hdl 60, ldl-c 104, wbc 5.7, hb 13.8, plt 228, ua neg, hscrp 3.8, uric 2.8, tsh 0.86, vit d 36, ua neg  From 6/13 showed   gluc 111, cr 0.70, gfr 99, alt 12,     chol 160, tg 73,   hdl 47, ldl-c 98  From 12/13 showed gluc 92,   cr 0.74, gfr 92, alt 13, hba1c 5.6, ldl-p 1055, chol 158, tg 82,   hdl 54, ldl-c 88  From 6/14 showed          chol 162, tg 97,   hdl 55, ldl-c 88,   wbc 6.2, hb 13.5, plt 228, ua neg  From 6/15 showed   gluc 98,   cr 1.01, gfr 57, alt<5, hba1c 5.5,    chol 177, tg 128, hdl 53, ldl-c 98,   wbc 5.8, hb 14.3, plt 238, ua neg  From 8/16 showed   gluc 102, cr 0.90, gfr 72, alt 13,     chol 241, tg 98,   hdl 68, ldl-c 156, wbc 4.8, hb 14.3, plt 255, ua neg, hep c neg  From 12/16 showed       alt 14,     chol 215, tg 116, hdl 62, ldl-c 130  From 4/17 showed       alt 12,     chol 189, tg 127, hdl 59, ldl-c 105  From 4/17 showed   gluc 102, cr 0.97, gfr 79,                 wbc 5.6, hb 14.5, plt 291, tsh 1.84, ft4 1.29  From 2/18 showed   gluc 92,   cr 0.86, gfr>60,                 wbc 5.2, hb 12.7, plt 561      Patient Active Problem List   Diagnosis Code    Hyperlipidemia E78.5    Colon polyps Dr. Fei Rosario 2011, adenoma 8/16 K63.5    Sjogren's disease, probable Dr. Blanche Douglass M35.00    Essential hypertension I10    Arthritis, degenerative knees Dr Nilda Bai M19.90    Gastroesophageal reflux disease without esophagitis K21.9    Allergic rhinitis J30.9    Obesity (BMI 30.0-34. 9) E66.9    Osteoarthritis of right knee M17.11     ASSESSMENT AND PLAN:  1. Allergic rhinitis. Continue current regimen. 2.  GERD. Continue ppi adn avoidance measures  3. Colon polyps. F/U 2021, fiber  4. Hyperlipidemia w FH CHD. Off meds for now due to intol  5. Hypertension. Continue current  6. Probable Sjogren's. Continue current and f/u Dr. Blanche Douglass prn  7. Obesity. Intermittent fasting discussed at length. Explained the concepts in detail. Went over possible physiologic changes that could occur and how that would possibly impact her situation in a positive way. Discussed 16:8 program in particular. We also went over the differences between hunger and zack hypoglycemia. Look up low insulin index foods. She will do some more research and consider implementing in the near future, standard handout given  8.  GI. She will call Dr Fei Rosario  9. Mammogram ordered  10. Gyn.  F/u Dr Edgardo Carter as directed  12. Insomnia. Ambien called in        RTC 6/19      Above conditions discussed at length and patient vocalized understanding.   All questions answered to patient satisfaction    Lipid lowering therapy not prescibed for patient reasons

## 2018-06-21 ENCOUNTER — APPOINTMENT (OUTPATIENT)
Dept: PHYSICAL THERAPY | Age: 58
End: 2018-06-21
Payer: COMMERCIAL

## 2018-06-22 ENCOUNTER — OFFICE VISIT (OUTPATIENT)
Dept: INTERNAL MEDICINE CLINIC | Age: 58
End: 2018-06-22

## 2018-06-22 VITALS
HEIGHT: 65 IN | RESPIRATION RATE: 14 BRPM | WEIGHT: 183 LBS | TEMPERATURE: 99.1 F | BODY MASS INDEX: 30.49 KG/M2 | HEART RATE: 91 BPM | DIASTOLIC BLOOD PRESSURE: 80 MMHG | OXYGEN SATURATION: 96 % | SYSTOLIC BLOOD PRESSURE: 110 MMHG

## 2018-06-22 DIAGNOSIS — E66.9 OBESITY (BMI 30.0-34.9): ICD-10-CM

## 2018-06-22 DIAGNOSIS — K21.9 GASTROESOPHAGEAL REFLUX DISEASE WITHOUT ESOPHAGITIS: ICD-10-CM

## 2018-06-22 DIAGNOSIS — M17.11 PRIMARY OSTEOARTHRITIS OF RIGHT KNEE: ICD-10-CM

## 2018-06-22 DIAGNOSIS — E78.5 HYPERLIPIDEMIA, UNSPECIFIED HYPERLIPIDEMIA TYPE: ICD-10-CM

## 2018-06-22 DIAGNOSIS — D12.6 ADENOMATOUS POLYP OF COLON, UNSPECIFIED PART OF COLON: ICD-10-CM

## 2018-06-22 DIAGNOSIS — G47.00 INSOMNIA, UNSPECIFIED TYPE: ICD-10-CM

## 2018-06-22 DIAGNOSIS — Z00.00 PHYSICAL EXAM: Primary | ICD-10-CM

## 2018-06-22 DIAGNOSIS — Z12.31 SCREENING MAMMOGRAM, ENCOUNTER FOR: ICD-10-CM

## 2018-06-22 DIAGNOSIS — I10 ESSENTIAL HYPERTENSION: ICD-10-CM

## 2018-06-22 RX ORDER — ZOLPIDEM TARTRATE 10 MG/1
10 TABLET ORAL
Qty: 30 TAB | Refills: 1 | OUTPATIENT
Start: 2018-06-22 | End: 2018-08-16 | Stop reason: SDUPTHER

## 2018-06-22 NOTE — PROGRESS NOTES
1. Have you been to the ER, urgent care clinic or hospitalized since your last visit? NO.     2. Have you seen or consulted any other health care providers outside of the [facility] since your last visit (Include any pap smears or colon screening)? NO      Do you have an Advanced Directive?  YES    Chief Complaint   Patient presents with    Request For New Medication     pt requesting a new med to help her sleep

## 2018-06-22 NOTE — MR AVS SNAPSHOT
[address] [facility] [address] 
 
 
 [address], [facility] [address] [facility] [address] 
[phone] Patient: Ewa Cole MRN: [id number] YXT:[birth year] Visit Information Date & Time Provider Department Dept. Phone Encounter #  
 6/22/2018  9:20 AM Sky Peña MD [facility] [address] [place] [address] [phone] [id number] Your Appointments 6/27/2018  9:15 AM  
LAB with [facility] NURSE VISIT [facility] [address] [place] [address] ([facility]) Appt Note: lab only no ov  
 [address], [address] [place] Nellie Shingles [address]  
  
   
 [address], [address]  
  
    
 7/3/2019  8:35 AM  
LAB with [facility] NURSE VISIT [facility] [address] [place] [address] ([facility]) Appt Note: lab  
 [address], [address] [facility] [address]  
[phone]  
  
    
 7/10/2019 10:30 AM  
PHYSICAL with Sky Peña MD  
[facility] [address] [place] [address]) Appt Note: physical  
 [address] [facility], [facility] Nellie Shingles [address]  
  
   
 [address], [address] Upcoming Health Maintenance Date Due DTaP/Tdap/Td series (1 - Tdap) 2/2/2007 Influenza Age 5 to Adult 8/1/2018 BREAST CANCER SCRN MAMMOGRAM 4/24/2019 PAP AKA CERVICAL CYTOLOGY 5/13/2020 COLONOSCOPY 8/29/2021 Allergies as of 6/22/2018  Review Complete On: 6/22/2018 By: Moy Ramirez LPN Severity Noted Reaction Type Reactions Benazepril  10/08/2011    Cough Chantix [Varenicline]    Nausea and Vomiting Codeine    Nausea and Vomiting, Other (comments) Abdominal Pain Crestor [Rosuvastatin]  04/19/2017    Myalgia Lipitor [Atorvastatin]  08/04/2016    Myalgia Pravastatin  12/12/2016    Myalgia Prevacid [Lansoprazole]    Other (comments) Constipation Zocor [Simvastatin]  04/19/2017    Myalgia Current Immunizations  Reviewed on 6/27/2014 Name Date Influenza Vaccine (Quad) PF 3/13/2018  7:49 AM, 12/12/2016 Influenza Vaccine PF 12/20/2013 TD Vaccine 2/1/2007 Not reviewed this visit You Were Diagnosed With   
  
 Codes Comments Insomnia, unspecified type     ICD-10-CM: G47.00 ICD-9-CM: 780.52 Vitals BP Pulse Temp Resp Height(growth percentile) Weight(growth percentile) 110/80 91 99.1 °F (37.3 °C) (Oral) 14 5' 5\" (1.651 m) 183 lb (83 kg) SpO2 BMI OB Status Smoking Status 96% 30.45 kg/m2 Postmenopausal Former Smoker Vitals History BMI and BSA Data Body Mass Index Body Surface Area  
 30.45 kg/m 2 1.95 m 2 Preferred Pharmacy Pharmacy Name Phone [facility] [address] [phone] Your Updated Medication List  
  
   
This list is accurate as of 6/22/18  9:57 AM.  Always use your most recent med list.  
  
  
  
  
 aspirin 325 mg tablet Commonly known as:  ASPIRIN Take 1 Tab by mouth two (2) times a day. cevimeline 30 mg capsule Commonly known as:  EVOXAC  
take 1 capsule by mouth three times a day HYDROmorphone 2 mg tablet Commonly known as:  DILAUDID Take 2 mg by mouth every four (4) hours as needed for Pain. ondansetron 8 mg disintegrating tablet Commonly known as:  ZOFRAN ODT Take 8 mg by mouth every eight (8) hours as needed for Nausea. oxyCODONE-acetaminophen 5-325 mg per tablet Commonly known as:  PERCOCET Take 1-2 Tabs by mouth every four (4) hours as needed. Max Daily Amount: 12 Tabs. pantoprazole 40 mg tablet Commonly known as:  PROTONIX  
take 1 tablet by mouth once daily  
  
 zolpidem 10 mg tablet Commonly known as:  AMBIEN Take 1 Tab by mouth nightly as needed for Sleep. Max Daily Amount: 10 mg. Introducing [facility] & HEALTH SERVICES! Dear Liz Crawley: Thank you for requesting a [employer] account.   Our records indicate that you already have an active [employer] account. You can access your account anytime at [email or web address] Did you know that you can access your hospital and ER discharge instructions at any time in [employer]? You can also review all of your test results from your hospital stay or ER visit. Additional Information If you have questions, please visit the Frequently Asked Questions section of the [employer] website at [email or web address] Remember, [employer] is NOT to be used for urgent needs. For medical emergencies, dial 911. Now available from your iPhone and Android! Please provide this summary of care documentation to your next provider. Your primary care clinician is listed as Murtaza Dean. If you have any questions after today's visit, please call [phone].

## 2018-06-27 ENCOUNTER — APPOINTMENT (OUTPATIENT)
Dept: INTERNAL MEDICINE CLINIC | Age: 58
End: 2018-06-27

## 2018-06-27 ENCOUNTER — TELEPHONE (OUTPATIENT)
Dept: INTERNAL MEDICINE CLINIC | Age: 58
End: 2018-06-27

## 2018-06-27 ENCOUNTER — HOSPITAL ENCOUNTER (OUTPATIENT)
Dept: LAB | Age: 58
Discharge: HOME | End: 2018-06-27

## 2018-06-27 PROCEDURE — 99001 SPECIMEN HANDLING PT-LAB: CPT | Performed by: INTERNAL MEDICINE

## 2018-06-28 LAB
ALBUMIN SERPL-MCNC: 4.1 G/DL (ref 3.5–5.5)
ALBUMIN/GLOB SERPL: 2.2 {RATIO} (ref 1.2–2.2)
ALP SERPL-CCNC: 101 IU/L (ref 39–117)
ALT SERPL-CCNC: 18 IU/L (ref 0–32)
AST SERPL-CCNC: 17 IU/L (ref 0–40)
BILIRUB SERPL-MCNC: 0.3 MG/DL (ref 0–1.2)
BUN SERPL-MCNC: 12 MG/DL (ref 6–24)
BUN/CREAT SERPL: 17 (ref 9–23)
CALCIUM SERPL-MCNC: 9.6 MG/DL (ref 8.7–10.2)
CHLORIDE SERPL-SCNC: 105 MMOL/L (ref 96–106)
CHOLEST SERPL-MCNC: 174 MG/DL (ref 100–199)
CO2 SERPL-SCNC: 25 MMOL/L (ref 20–29)
CREAT SERPL-MCNC: 0.71 MG/DL (ref 0.57–1)
ERYTHROCYTE [DISTWIDTH] IN BLOOD BY AUTOMATED COUNT: 13.4 % (ref 12.3–15.4)
GLOBULIN SER CALC-MCNC: 1.9 G/DL (ref 1.5–4.5)
GLUCOSE SERPL-MCNC: 89 MG/DL (ref 65–99)
HCT VFR BLD AUTO: 39.5 % (ref 34–46.6)
HDLC SERPL-MCNC: 42 MG/DL
HGB BLD-MCNC: 12.8 G/DL (ref 11.1–15.9)
INTERPRETATION, 910389: NORMAL
LDLC SERPL CALC-MCNC: 102 MG/DL (ref 0–99)
MCH RBC QN AUTO: 30 PG (ref 26.6–33)
MCHC RBC AUTO-ENTMCNC: 32.4 G/DL (ref 31.5–35.7)
MCV RBC AUTO: 93 FL (ref 79–97)
PLATELET # BLD AUTO: 240 X10E3/UL (ref 150–379)
POTASSIUM SERPL-SCNC: 4.3 MMOL/L (ref 3.5–5.2)
PROT SERPL-MCNC: 6 G/DL (ref 6–8.5)
RBC # BLD AUTO: 4.27 X10E6/UL (ref 3.77–5.28)
SODIUM SERPL-SCNC: 143 MMOL/L (ref 134–144)
T4 FREE SERPL-MCNC: 1.96 NG/DL (ref 0.82–1.77)
TRIGL SERPL-MCNC: 152 MG/DL (ref 0–149)
TSH SERPL DL<=0.005 MIU/L-ACNC: <0.006 UIU/ML (ref 0.45–4.5)
VLDLC SERPL CALC-MCNC: 30 MG/DL (ref 5–40)
WBC # BLD AUTO: 3.2 X10E3/UL (ref 3.4–10.8)

## 2018-07-02 ENCOUNTER — HOSPITAL ENCOUNTER (OUTPATIENT)
Dept: MAMMOGRAPHY | Age: 58
Discharge: HOME | End: 2018-07-02
Attending: INTERNAL MEDICINE
Payer: COMMERCIAL

## 2018-07-02 ENCOUNTER — TELEPHONE (OUTPATIENT)
Dept: INTERNAL MEDICINE CLINIC | Age: 58
End: 2018-07-02

## 2018-07-02 DIAGNOSIS — E05.90 HYPERTHYROIDISM: Primary | ICD-10-CM

## 2018-07-02 DIAGNOSIS — Z12.31 SCREENING MAMMOGRAM, ENCOUNTER FOR: ICD-10-CM

## 2018-07-02 PROCEDURE — 77067 SCR MAMMO BI INCL CAD: CPT

## 2018-07-03 NOTE — TELEPHONE ENCOUNTER
pls call    Results for orders placed or performed in visit on 90/37/19   METABOLIC PANEL, COMPREHENSIVE   Result Value Ref Range    Glucose 89 65 - 99 mg/dL    BUN 12 6 - 24 mg/dL    Creatinine 0.71 0.57 - 1.00 mg/dL    GFR est non-AA 94 >59 mL/min/1.73    GFR est  >59 mL/min/1.73    BUN/Creatinine ratio 17 9 - 23    Sodium 143 134 - 144 mmol/L    Potassium 4.3 3.5 - 5.2 mmol/L    Chloride 105 96 - 106 mmol/L    CO2 25 20 - 29 mmol/L    Calcium 9.6 8.7 - 10.2 mg/dL    Protein, total 6.0 6.0 - 8.5 g/dL    Albumin 4.1 3.5 - 5.5 g/dL    GLOBULIN, TOTAL 1.9 1.5 - 4.5 g/dL    A-G Ratio 2.2 1.2 - 2.2    Bilirubin, total 0.3 0.0 - 1.2 mg/dL    Alk.  phosphatase 101 39 - 117 IU/L    AST (SGOT) 17 0 - 40 IU/L    ALT (SGPT) 18 0 - 32 IU/L   CBC W/O DIFF   Result Value Ref Range    WBC 3.2 (L) 3.4 - 10.8 x10E3/uL    RBC 4.27 3.77 - 5.28 x10E6/uL    HGB 12.8 11.1 - 15.9 g/dL    HCT 39.5 34.0 - 46.6 %    MCV 93 79 - 97 fL    MCH 30.0 26.6 - 33.0 pg    MCHC 32.4 31.5 - 35.7 g/dL    RDW 13.4 12.3 - 15.4 %    PLATELET 268 987 - 747 x10E3/uL   LIPID PANEL   Result Value Ref Range    Cholesterol, total 174 100 - 199 mg/dL    Triglyceride 152 (H) 0 - 149 mg/dL    HDL Cholesterol 42 >39 mg/dL    VLDL, calculated 30 5 - 40 mg/dL    LDL, calculated 102 (H) 0 - 99 mg/dL   T4, FREE   Result Value Ref Range    T4, Free 1.96 (H) 0.82 - 1.77 ng/dL   TSH 3RD GENERATION   Result Value Ref Range    TSH <0.006 (L) 0.450 - 4.500 uIU/mL   CVD REPORT   Result Value Ref Range    INTERPRETATION Note      Labs ok except thyroid appears overactive  Recheck pls

## 2018-07-05 ENCOUNTER — HOSPITAL ENCOUNTER (OUTPATIENT)
Dept: LAB | Age: 58
Discharge: HOME | End: 2018-07-05

## 2018-07-05 PROCEDURE — 99001 SPECIMEN HANDLING PT-LAB: CPT | Performed by: INTERNAL MEDICINE

## 2018-07-06 LAB
TSH RECEP AB SER-ACNC: 1.78 IU/L (ref 0–1.75)
TSH SERPL DL<=0.005 MIU/L-ACNC: <0.006 UIU/ML (ref 0.45–4.5)

## 2018-07-08 ENCOUNTER — TELEPHONE (OUTPATIENT)
Dept: INTERNAL MEDICINE CLINIC | Age: 58
End: 2018-07-08

## 2018-07-08 DIAGNOSIS — E05.90 HYPERTHYROIDISM: Primary | ICD-10-CM

## 2018-07-09 NOTE — TELEPHONE ENCOUNTER
pls call    Results for orders placed or performed in visit on 07/02/18   TSH 3RD GENERATION   Result Value Ref Range    TSH <0.006 (L) 0.450 - 4.500 uIU/mL   TSH RECEPTOR AB   Result Value Ref Range    Thyrotropin Receptor Ab, serum 1.78 (H) 0.00 - 1.75 IU/L     Labs c/w Hyperthyroidism  pls sched endocrine first avail dr Kari Madrigal

## 2018-07-24 NOTE — PROGRESS NOTES
[facility] at [address]  [address]  Phone: [phone]      Fax:  [phone]    Discharge Summary    Patient name: Ewa Cole Start of Care: 18   Referral source: Dave Wyatt MD : 1960   Medical/Treatment Diagnosis: Right knee pain [M25.561] Onset Date:3/12/18     Prior Hospitalization: see medical history Provider#: [id number]   Medications: Verified on Patient Summary List    Comorbidities: None  Prior Level of Function:Unable to kneel, did steps one at a time, instability upon standing, limited walking due to pain. Visits from Start of Care: 18    Missed Visits: 1    Reporting Period : 18 to 18        Goal: Patient's pain level will be 1-2/10 with activity in order to improve patient's ability to perform normal ADLs. Status at last note/certification: 6-3/92  Status at discharge: met    Goal: Patient will demonstrate 0-120 degrees AROM right knee to increase ease of ADLs. Status at last note/certification: 1-346 degrees  Status at discharge: not met    Goal: Patient will increase FOTO > 20 pts (to 61) to increase functional mobility  Status at last note/certification: 55  Status at discharge: not met    Goal: Patient will ascend and descend steps with reciprocal pattern to increase ease of entry into home. Status at last note/certification: Doing steps reciprocally. Status at discharge: met      Assessment/ Summary of Care: Patient is moving better with improved ambulation, she is doing much better on stairs with minimal pain when doing steps reciprocally. She has no pain with normal activity.     RECOMMENDATIONS:  [x]Discontinue therapy: [x]Patient has reached or is progressing toward set goals      []Patient is non-compliant or has abdicated      []Due to lack of appreciable progress towards set goals    Jennifer Borden, PT 2018 8:50 AM

## 2018-08-16 DIAGNOSIS — G47.00 INSOMNIA, UNSPECIFIED TYPE: ICD-10-CM

## 2018-08-16 RX ORDER — ZOLPIDEM TARTRATE 10 MG/1
10 TABLET ORAL
Qty: 30 TAB | Refills: 1 | OUTPATIENT
Start: 2018-08-16 | End: 2019-02-08 | Stop reason: SDUPTHER

## 2018-08-27 RX ORDER — PANTOPRAZOLE SODIUM 40 MG/1
TABLET, DELAYED RELEASE ORAL
Qty: 90 TAB | Refills: 3 | Status: SHIPPED | OUTPATIENT
Start: 2018-08-27 | End: 2019-07-10 | Stop reason: SDUPTHER

## 2018-10-04 RX ORDER — CEVIMELINE HYDROCHLORIDE 30 MG/1
CAPSULE ORAL
Qty: 270 CAP | Refills: 3 | Status: SHIPPED | OUTPATIENT
Start: 2018-10-04 | End: 2019-07-10 | Stop reason: SDUPTHER

## 2018-10-19 RX ORDER — CEVIMELINE HYDROCHLORIDE 30 MG/1
CAPSULE ORAL
Qty: 270 CAP | Refills: 3 | Status: SHIPPED | OUTPATIENT
Start: 2018-10-19 | End: 2019-07-10 | Stop reason: SDUPTHER

## 2018-12-20 DIAGNOSIS — E78.5 HYPERLIPIDEMIA, UNSPECIFIED HYPERLIPIDEMIA TYPE: ICD-10-CM

## 2018-12-20 DIAGNOSIS — Z00.00 PHYSICAL EXAM: ICD-10-CM

## 2018-12-22 DIAGNOSIS — E78.5 HYPERLIPIDEMIA, UNSPECIFIED HYPERLIPIDEMIA TYPE: ICD-10-CM

## 2018-12-22 DIAGNOSIS — Z00.00 PHYSICAL EXAM: ICD-10-CM

## 2019-02-08 DIAGNOSIS — G47.00 INSOMNIA, UNSPECIFIED TYPE: ICD-10-CM

## 2019-02-08 RX ORDER — ZOLPIDEM TARTRATE 10 MG/1
10 TABLET ORAL
Qty: 30 TAB | Refills: 1 | OUTPATIENT
Start: 2019-02-08 | End: 2019-05-24 | Stop reason: SDUPTHER

## 2019-05-24 ENCOUNTER — OFFICE VISIT (OUTPATIENT)
Dept: INTERNAL MEDICINE CLINIC | Age: 59
End: 2019-05-24

## 2019-05-24 VITALS
DIASTOLIC BLOOD PRESSURE: 104 MMHG | HEIGHT: 65 IN | BODY MASS INDEX: 31.65 KG/M2 | SYSTOLIC BLOOD PRESSURE: 173 MMHG | RESPIRATION RATE: 14 BRPM | TEMPERATURE: 99.2 F | OXYGEN SATURATION: 97 % | HEART RATE: 56 BPM | WEIGHT: 190 LBS

## 2019-05-24 DIAGNOSIS — G47.00 INSOMNIA, UNSPECIFIED TYPE: ICD-10-CM

## 2019-05-24 DIAGNOSIS — R03.0 ELEVATED BLOOD PRESSURE READING: Primary | ICD-10-CM

## 2019-05-24 RX ORDER — AMOXICILLIN AND CLAVULANATE POTASSIUM 875; 125 MG/1; MG/1
TABLET, FILM COATED ORAL
COMMUNITY
Start: 2019-05-23 | End: 2019-07-10 | Stop reason: ALTCHOICE

## 2019-05-24 RX ORDER — ZOLPIDEM TARTRATE 10 MG/1
10 TABLET ORAL
Qty: 30 TAB | Refills: 1 | OUTPATIENT
Start: 2019-05-24 | End: 2019-07-10 | Stop reason: SDUPTHER

## 2019-05-24 RX ORDER — AMLODIPINE BESYLATE 10 MG/1
10 TABLET ORAL DAILY
Qty: 90 TAB | Refills: 0 | Status: SHIPPED | OUTPATIENT
Start: 2019-05-24 | End: 2019-07-10 | Stop reason: SDUPTHER

## 2019-05-24 RX ORDER — METHIMAZOLE 5 MG/1
TABLET ORAL
Refills: 0 | COMMUNITY
Start: 2019-03-06 | End: 2020-07-16 | Stop reason: SDUPTHER

## 2019-05-24 NOTE — PROGRESS NOTES
Godwin Holley is a 60 y.o.  female and presents with    Chief Complaint   Patient presents with    Elevated Blood Pressure     Went to urgent care on lastnight and was told to report to pcp for elvated /118 then they rechecked 190/100 then she rechecked lastnight 169/93. Subjective:  HPI  Mrs. Linares presents today with elevated BP. She presented to urgent care yesterday for URI symptoms, placed on Augmentin- reports feels much improved since starting, she was taking Sudafed, Dayquil/Nyquil she takes Ibuprofen 800 mg every morning for knee pain s/p replacement. Noted 240/118. She reports last night was 169/93. I reviewed prior VS and note controlled. She was diagnosed approx 3 years ago with HTN, hx of HPL, hyperthyroidism- under the care of Dr. Vinita Cole- last seen in 3/2019- she is taking Methimazole 5 mg daily, sjogren's dx, obesity. Last CMP 6/2018 normal. She reports was able to maintain BP with weight loss. Additional Concerns: none     ROS   Review of Systems   Constitutional: Negative. HENT: Negative. Eyes: Negative. Cardiovascular: Negative for chest pain, palpitations and leg swelling. Gastrointestinal: Negative. Neurological: Negative for dizziness, sensory change, speech change, loss of consciousness and headaches. All other systems reviewed and are negative. Allergies   Allergen Reactions    Benazepril Cough    Chantix [Varenicline] Nausea and Vomiting    Codeine Nausea and Vomiting and Other (comments)     Abdominal Pain    Crestor [Rosuvastatin] Myalgia    Lipitor [Atorvastatin] Myalgia    Pravastatin Myalgia    Prevacid [Lansoprazole] Other (comments)     Constipation    Zocor [Simvastatin] Myalgia       Current Outpatient Medications   Medication Sig Dispense Refill    amLODIPine (NORVASC) 10 mg tablet Take 1 Tab by mouth daily. 90 Tab 0    zolpidem (AMBIEN) 10 mg tablet Take 1 Tab by mouth nightly as needed for Sleep.  Max Daily Amount: 10 mg. 30 Tab 1    pantoprazole (PROTONIX) 40 mg tablet take 1 tablet by mouth once daily 90 Tab 3    pantoprazole (PROTONIX) 40 mg tablet take 1 tablet by mouth once daily (Patient taking differently: daily. take 1 tablet by mouth once daily  Indications: gastroesophageal reflux disease) 90 Tab 3    cevimeline (EVOXAC) 30 mg capsule take 1 capsule by mouth three times a day (Patient taking differently: 60 mg every morning. take 1 capsule by mouth three times a day  Indications: XEROSTOMIA SECONDARY TO SJOGREN'S SYNDROME) 270 Cap 3    amoxicillin-clavulanate (AUGMENTIN) 875-125 mg per tablet       methIMAzole (TAPAZOLE) 5 mg tablet take 1 tablet by mouth once daily  0    cevimeline (EVOXAC) 30 mg capsule take 1 capsule by mouth three times a day 270 Cap 3    cevimeline (EVOXAC) 30 mg capsule take 1 capsule by mouth three times a day 270 Cap 3       Social History     Socioeconomic History    Marital status: SINGLE     Spouse name: Not on file    Number of children: 3    Years of education: Not on file    Highest education level: Not on file   Occupational History    Occupation: [employer]    Financial resource strain: Not on file    Food insecurity:     Worry: Not on file     Inability: Not on file    Transportation needs:     Medical: Not on file     Non-medical: Not on file   Tobacco Use    Smoking status: Former Smoker     Packs/day: 0.50    Smokeless tobacco: Never Used   Substance and Sexual Activity    Alcohol use:  Yes     Alcohol/week: 0.6 oz     Types: 1 Glasses of wine per week    Drug use: No    Sexual activity: Not on file   Lifestyle    Physical activity:     Days per week: Not on file     Minutes per session: Not on file    Stress: Not on file   Relationships    Social connections:     Talks on phone: Not on file     Gets together: Not on file     Attends Yazdanism service: Not on file     Active member of club or organization: Not on file     Attends meetings of clubs or organizations: Not on file     Relationship status: Not on file    Intimate partner violence:     Fear of current or ex partner: Not on file     Emotionally abused: Not on file     Physically abused: Not on file     Forced sexual activity: Not on file   Other Topics Concern    Not on file   Social History Narrative    Not on file       Past Medical History:   Diagnosis Date    Allergic rhinitis Dr. Elle Navarrete 2011     Colon adenoma 08/2016    Dr Yo Anthony 2007, 2011, 8/16    DJD (degenerative joint disease)     B knees Dr. Anamika Cutler FHx: heart disease     Gastritis 07/2017    h pylori+ Dr Annabella Apple GERD (gastroesophageal reflux disease) 2007    [facility]    Hyperlipidemia     calculated risk score 1.9% (2/13); elected to take statins due to Fhx, elev hscrp/ldl-p but intol of 4 diff ones    Hypertension     resolved    Hyperthyroidism     Obesity (BMI 30.0-34.9)     peak weight 189 lbs, bmi 31.5 from 12/16; IF 6/18    Overweight (BMI 25.0-29.9)     saw Dr Shemar Stokes; qsymia ineffective 6/14-6/15    Sjogren's disease, probable Dr. David Ryan 8/12       Past Surgical History:   Procedure Laterality Date    HX APPENDECTOMY  1980s    HX BREAST BIOPSY      right    HX CHOLECYSTECTOMY  1980s    HX COLONOSCOPY      [facility] 2007, Dr. Jasiel Anthony 2011, 8/16 adenoma    HX ORTHOPAEDIC      RIGHT TKR Dr Javi Chavarria 2017    HX TUBAL LIGATION         Family History   Problem Relation Age of Onset    Hypertension Mother     Heart Disease Father     Cancer Paternal Grandmother         ovarian    Cancer Paternal Aunt     Ovarian Cancer Maternal Grandmother        Objective:  Vitals:    05/24/19 1032   BP: (!) 173/104   Pulse: (!) 56   Resp: 14   Temp: 99.2 °F (37.3 °C)   TempSrc: Oral   SpO2: 97%   Weight: 190 lb (86.2 kg)   Height: 5' 5\" (1.651 m)   PainSc:   0 - No pain       LABS   Results for orders placed or performed in visit on 07/02/18   TSH 3RD GENERATION Result Value Ref Range    TSH <0.006 (L) 0.450 - 4.500 uIU/mL   TSH RECEPTOR AB   Result Value Ref Range    Thyrotropin Receptor Ab, serum 1.78 (H) 0.00 - 1.75 IU/L       TESTS  none    PE  Physical Exam   Constitutional: She is oriented to person, place, and time. She appears well-developed and well-nourished. No distress. HENT:   Head: Normocephalic and atraumatic. Eyes: Pupils are equal, round, and reactive to light. Musculoskeletal: Normal range of motion. Neurological: She is alert and oriented to person, place, and time. Skin: Skin is warm and dry. She is not diaphoretic. Psychiatric: She has a normal mood and affect. Her behavior is normal. Judgment and thought content normal.   Vitals reviewed. Assessment/Plan:    1. Elevated BP with hx of HTN- Started on Norvasc 10 mg- instructed to take 1/2 tab, monitor home BP if still elevated above 130/80 take full tablet. See PCP with home readings in 2 weeks. Stop Sudafed and other OTC cold remedies, start lifestyle changes as reports able to control BP with weight loss in the past.        Lab review: no lab studies available for review at time of visit    Today's Visit:   Diagnoses and all orders for this visit:    1. Elevated blood pressure reading    Other orders  -     amLODIPine (NORVASC) 10 mg tablet; Take 1 Tab by mouth daily. Health Maintenance: Deferred to PCP. I have discussed the diagnosis with the patient and the intended plan as seen in the above orders. The patient has received an after-visit summary and questions were answered concerning future plans. I have discussed medication side effects and warnings with the patient as well. I have reviewed the plan of care with the patient, accepted their input and they are in agreement with the treatment goals. Follow-up and Dispositions    · Return in about 2 weeks (around 6/7/2019), or if symptoms worsen or fail to improve, for elevated BP.         More than 1/2 of this 15 minute visit was spent in counseling and coordination of care, as described above.     TERRY Nieves  Internist of [address], [address] [place] [address].  Phone: [phone]  Fax: [phone]

## 2019-05-24 NOTE — PROGRESS NOTES
Mandi Rees presents today for   Chief Complaint   Patient presents with    Elevated Blood Pressure     Went to urgent care on lastnight and was told to report to pcp for elvated /118 then they rechecked 190/100 then she rechecked lastnight 169/93. Depression Screening:  3 most recent PHQ Screens 5/24/2019   Little interest or pleasure in doing things Not at all   Feeling down, depressed, irritable, or hopeless Not at all   Total Score PHQ 2 0       Learning Assessment:  Learning Assessment 5/24/2019   PRIMARY LEARNER Patient   HIGHEST LEVEL OF EDUCATION - PRIMARY LEARNER  > 4 YEARS OF COLLEGE   BARRIERS PRIMARY LEARNER NONE   CO-LEARNER CAREGIVER No   PRIMARY LANGUAGE ENGLISH   LEARNER PREFERENCE PRIMARY READING   ANSWERED BY patient   RELATIONSHIP SELF       Abuse Screening:  Abuse Screening Questionnaire 5/24/2019   Do you ever feel afraid of your partner? N   Are you in a relationship with someone who physically or mentally threatens you? N   Is it safe for you to go home? Y       Fall Risk  Fall Risk Assessment, last 12 mths 5/24/2019   Able to walk? Yes   Fall in past 12 months? No           Coordination of Care:  1. Have you been to the ER, urgent care clinic since your last visit? Hospitalized since your last visit? Yes urgent care 5/23/19    2. Have you seen or consulted any other health care providers outside of the [facility] since your last visit? Include any pap smears or colon screening.  no

## 2019-07-05 ENCOUNTER — TELEPHONE (OUTPATIENT)
Dept: INTERNAL MEDICINE CLINIC | Age: 59
End: 2019-07-05

## 2019-07-05 DIAGNOSIS — Z00.00 PHYSICAL EXAM: Primary | ICD-10-CM

## 2019-07-10 ENCOUNTER — OFFICE VISIT (OUTPATIENT)
Dept: INTERNAL MEDICINE CLINIC | Age: 59
End: 2019-07-10

## 2019-07-10 ENCOUNTER — TELEPHONE (OUTPATIENT)
Dept: INTERNAL MEDICINE CLINIC | Age: 59
End: 2019-07-10

## 2019-07-10 VITALS
BODY MASS INDEX: 31.32 KG/M2 | RESPIRATION RATE: 14 BRPM | OXYGEN SATURATION: 97 % | SYSTOLIC BLOOD PRESSURE: 130 MMHG | DIASTOLIC BLOOD PRESSURE: 89 MMHG | HEART RATE: 81 BPM | WEIGHT: 188 LBS | TEMPERATURE: 99.1 F | HEIGHT: 65 IN

## 2019-07-10 DIAGNOSIS — E05.90 HYPERTHYROIDISM: ICD-10-CM

## 2019-07-10 DIAGNOSIS — M17.9 OSTEOARTHRITIS OF KNEE, UNSPECIFIED LATERALITY, UNSPECIFIED OSTEOARTHRITIS TYPE: ICD-10-CM

## 2019-07-10 DIAGNOSIS — D12.6 ADENOMATOUS POLYP OF COLON, UNSPECIFIED PART OF COLON: ICD-10-CM

## 2019-07-10 DIAGNOSIS — I10 ESSENTIAL HYPERTENSION: ICD-10-CM

## 2019-07-10 DIAGNOSIS — Z00.00 PHYSICAL EXAM: Primary | ICD-10-CM

## 2019-07-10 DIAGNOSIS — K21.9 GASTROESOPHAGEAL REFLUX DISEASE WITHOUT ESOPHAGITIS: ICD-10-CM

## 2019-07-10 DIAGNOSIS — E78.5 HYPERLIPIDEMIA, UNSPECIFIED HYPERLIPIDEMIA TYPE: ICD-10-CM

## 2019-07-10 DIAGNOSIS — G47.00 INSOMNIA, UNSPECIFIED TYPE: ICD-10-CM

## 2019-07-10 DIAGNOSIS — M35.00 SJOGREN'S DISEASE (HCC): ICD-10-CM

## 2019-07-10 DIAGNOSIS — E66.9 OBESITY (BMI 30.0-34.9): ICD-10-CM

## 2019-07-10 PROBLEM — M17.11 OSTEOARTHRITIS OF RIGHT KNEE: Status: RESOLVED | Noted: 2018-03-12 | Resolved: 2019-07-10

## 2019-07-10 RX ORDER — PANTOPRAZOLE SODIUM 40 MG/1
TABLET, DELAYED RELEASE ORAL
Qty: 90 TAB | Refills: 3 | Status: SHIPPED | OUTPATIENT
Start: 2019-07-10 | End: 2020-04-09

## 2019-07-10 RX ORDER — CELECOXIB 200 MG/1
200 CAPSULE ORAL
Qty: 60 CAP | Refills: 11 | Status: SHIPPED | OUTPATIENT
Start: 2019-07-10 | End: 2020-07-16 | Stop reason: SDUPTHER

## 2019-07-10 RX ORDER — AMLODIPINE BESYLATE 10 MG/1
10 TABLET ORAL DAILY
Qty: 90 TAB | Refills: 3 | Status: SHIPPED | OUTPATIENT
Start: 2019-07-10 | End: 2020-07-16 | Stop reason: SDUPTHER

## 2019-07-10 RX ORDER — ZOLPIDEM TARTRATE 10 MG/1
10 TABLET ORAL
Qty: 30 TAB | Refills: 1 | OUTPATIENT
Start: 2019-07-10 | End: 2020-07-16 | Stop reason: SDUPTHER

## 2019-07-10 RX ORDER — CEVIMELINE HYDROCHLORIDE 30 MG/1
CAPSULE ORAL
Qty: 270 CAP | Refills: 3 | Status: SHIPPED | OUTPATIENT
Start: 2019-07-10 | End: 2020-05-11

## 2019-07-10 NOTE — PROGRESS NOTES
Kitty Amy presents today for   Chief Complaint   Patient presents with    Physical     with labs    Request For New Medication     ibuprofen 800mg              Depression Screening:  3 most recent PHQ Screens 5/24/2019   Little interest or pleasure in doing things Not at all   Feeling down, depressed, irritable, or hopeless Not at all   Total Score PHQ 2 0       Learning Assessment:  Learning Assessment 5/24/2019   PRIMARY LEARNER Patient   HIGHEST LEVEL OF EDUCATION - PRIMARY LEARNER  > 4 YEARS OF COLLEGE   BARRIERS PRIMARY LEARNER NONE   CO-LEARNER CAREGIVER No   PRIMARY LANGUAGE ENGLISH   LEARNER PREFERENCE PRIMARY READING   ANSWERED BY patient   RELATIONSHIP SELF       Abuse Screening:  Abuse Screening Questionnaire 5/24/2019   Do you ever feel afraid of your partner? N   Are you in a relationship with someone who physically or mentally threatens you? N   Is it safe for you to go home? Y       Fall Risk  Fall Risk Assessment, last 12 mths 5/24/2019   Able to walk? Yes   Fall in past 12 months? No           Coordination of Care:  1. Have you been to the ER, urgent care clinic since your last visit? Hospitalized since your last visit? no    2. Have you seen or consulted any other health care providers outside of the [address] [place] [address] since your last visit? Include any pap smears or colon screening.  no

## 2019-07-11 NOTE — PROGRESS NOTES
60 y.o. WHITE OR  female who presents for RPE    She saw NP Simone Robison when her bp was noted to be elevated and she was started on amlodipine. bp now controlled and no sfx to report    Doing ok after the R TKR but they're holding off on the left side.   She is using naproxen but might be having some stomach upset with it    She tried the IF for a few months and did lose about 13 lbs but she stopped because she had to take her meds in the am and would have stomach upset with it empty    She regained a bit of the weight but plans on restarting the IF    Vitals 7/10/2019 5/24/2019 6/22/2018   Weight 188 lb 190 lb 183 lb     The Sjogren's is being tx'ed w evoxac, not seeing Dr Eryn Culp at this time    She's been under some stress with a new boss but things are settling down    IF 6/18    Past Medical History:   Diagnosis Date    Allergic rhinitis Dr. Alona Ferris 2011     Colon adenoma 08/2016    Dr Emy Chaparro 2007, 2011, 8/16    DJD (degenerative joint disease)     B knees Dr. Tavarez Found FHx: heart disease     Gastritis 07/2017    h pylori+ Dr Gracy Robertson GERD (gastroesophageal reflux disease) 2007    [facility]    Hyperlipidemia     calculated risk score 1.9% (2/13); elected to take statins due to Fhx, elev hscrp/ldl-p but intol of 4 diff ones    Hypertension     Hyperthyroidism 06/2018    Dr Karen Grady Obesity (BMI 30.0-34.9)     peak weight 189 lbs, bmi 31.5 from 12/16; IF 6/18 start weight 183 lbs but not doing    Overweight (BMI 25.0-29.9)     saw Dr Tutu Russ; qsymia ineffective 6/14-6/15    Sjogren's disease, probable Dr. Eryn Culp 8/12     Past Surgical History:   Procedure Laterality Date    HX APPENDECTOMY  1980s    HX BREAST BIOPSY      right    HX CHOLECYSTECTOMY  1980s    HX COLONOSCOPY      [facility] 2007, Dr. Anni Chaparro 2011, 8/16 adenoma    HX ORTHOPAEDIC      RIGHT TKR Dr [place] 2017    [facility]     Socioeconomic History    Marital status: SINGLE     Spouse name: Not on file    Number of children: 3    Years of education: Not on file    Highest education level: Not on file   Occupational History    Occupation: [employer]   Social Needs    Financial resource strain: Not on file    Food insecurity:     Worry: Not on file     Inability: Not on file    Transportation needs:     Medical: Not on file     Non-medical: Not on file   Tobacco Use    Smoking status: Former Smoker     Packs/day: 0.50    Smokeless tobacco: Never Used   Substance and Sexual Activity    Alcohol use: Yes     Alcohol/week: 0.6 oz     Types: 1 Glasses of wine per week    Drug use: No    Sexual activity: Not on file   Lifestyle    Physical activity:     Days per week: Not on file     Minutes per session: Not on file    Stress: Not on file   Relationships    Social connections:     Talks on phone: Not on file     Gets together: Not on file     Attends Anabaptism service: Not on file     Active member of club or organization: Not on file     Attends meetings of clubs or organizations: Not on file     Relationship status: Not on file    Intimate partner violence:     Fear of current or ex partner: Not on file     Emotionally abused: Not on file     Physically abused: Not on file     Forced sexual activity: Not on file   Other Topics Concern    Not on file   Social History Narrative    Not on file     Current Outpatient Medications   Medication Sig    cevimeline (EVOXAC) 30 mg capsule take 1 capsule by mouth three times a day    pantoprazole (PROTONIX) 40 mg tablet take 1 tablet by mouth once daily    amLODIPine (NORVASC) 10 mg tablet Take 1 Tab by mouth daily.  zolpidem (AMBIEN) 10 mg tablet Take 1 Tab by mouth nightly as needed for Sleep. Max Daily Amount: 10 mg.    celecoxib (CELEBREX) 200 mg capsule Take 1 Cap by mouth two (2) times daily as needed for Pain.     methIMAzole (TAPAZOLE) 5 mg tablet take 1 tablet by mouth once daily     No current facility-administered medications for this visit. Allergies   Allergen Reactions    Benazepril Cough    Chantix [Varenicline] Nausea and Vomiting    Codeine Nausea and Vomiting and Other (comments)     Abdominal Pain    Crestor [Rosuvastatin] Myalgia    Lipitor [Atorvastatin] Myalgia    Pravastatin Myalgia    Prevacid [Lansoprazole] Other (comments)     Constipation    Zocor [Simvastatin] Myalgia     REVIEW OF SYSTEMS:  gyn 2017 Dr Lula Hammer, [facility] 7/19, colo 8/16 Dr Bao Martinez  no vision change or eye pain  Oral  no mouth pain, tongue or tooth problems  Ears  no hearing loss, ear pain, fullness, no swallowing problems  Cardiac  no CP, PND, orthopnea, edema, palpitations or syncope  Chest  no breast masses  Resp  no wheezing, chronic coughing, dyspnea  Neuro  no focal weakness, numbness, paresthesias, incoordination, ataxia, involuntary movements  Endo - no polyuria, polydipsia, nocturia, hot flashes    Visit Vitals  /89   Pulse 81   Temp 99.1 °F (37.3 °C) (Oral)   Resp 14   Ht 5' 5\" (1.651 m)   Wt 188 lb (85.3 kg)   SpO2 97%   BMI 31.28 kg/m²     Affect is appropriate. Mood stable  No apparent distress  HEENT --Anicteric sclerae. No thyromegaly, JVD, or bruits. Lungs --Clear to auscultation, normal percussion. Heart --Regular rate and rhythm, no murmurs, rubs, gallops, or clicks. Chest wall --Nontender to palpation. PMI normal.  Abdomen -- Soft and nontender, no hepatosplenomegaly or masses. Extremities -- Without cyanosis, clubbing, edema. 2+ pulses equally and bilaterally.     LABS  From 10/11 showed gluc 92,   cr 0.91, gfr 73, alt 16,         ldl-p 1793, chol 221, tg 99,   hdl 65, ldl-c 136, wbc 6.1, hb 14.5, plt 249, jim neg, vit d 37.1, esr 5  From 2/13 showed   gluc 95,   cr 0.82, gfr 82, alt <5                                   chol 220, tg 117, hdl 53, ldl-c 144  From 4/13 showed   gluc 85,   cr 0.70,            alt 15, hba1c 5.0, ldl-p 1372, chol 163, tg 68,    hdl 60, ldl-c 104, wbc 5.7, hb 13.8, plt 228, ua neg, hscrp 3.8, uric 2.8, tsh 0.86, vit d 36, ua neg  From 6/13 showed   gluc 111, cr 0.70, gfr 99, alt 12,     chol 160, tg 73,   hdl 47, ldl-c 98  From 12/13 showed gluc 92,   cr 0.74, gfr 92, alt 13, hba1c 5.6, ldl-p 1055, chol 158, tg 82,   hdl 54, ldl-c 88  From 6/14 showed          chol 162, tg 97,   hdl 55, ldl-c 88,   wbc 6.2, hb 13.5, plt 228, ua neg  From 6/15 showed   gluc 98,   cr 1.01, gfr 57, alt<5, hba1c 5.5,    chol 177, tg 128, hdl 53, ldl-c 98,   wbc 5.8, hb 14.3, plt 238, ua neg  From 8/16 showed   gluc 102, cr 0.90, gfr 72, alt 13,     chol 241, tg 98,   hdl 68, ldl-c 156, wbc 4.8, hb 14.3, plt 255, ua neg, hep c neg  From 12/16 showed       alt 14,     chol 215, tg 116, hdl 62, ldl-c 130  From 4/17 showed       alt 12,     chol 189, tg 127, hdl 59, ldl-c 105  From 4/17 showed   gluc 102, cr 0.97, gfr 79,                 wbc 5.6, hb 14.5, plt 291, tsh 1.84, ft4 1.29  From 2/18 showed   gluc 92,   cr 0.86, gfr>60,                 wbc 5.2, hb 12.7, plt 561  From 6/18 showed   gluc 89, cr 0.71, gfr 94, alt 18,    chol 174, tg 152, hdl 42, ldl-c 102,           wbc 3.2, hb 12.8, plt 240  From 7/18 showed tsh<0.01, ft4 1.96, TRAb+    Results for orders placed or performed in visit on 07/03/19   TSH RECEPTOR AB   Result Value Ref Range    Thyrotropin Receptor Ab, serum 1.16 0.00 - 1.75 IU/L   TSH 3RD GENERATION   Result Value Ref Range    TSH 1.740 0.450 - 4.500 uIU/mL       Patient Active Problem List   Diagnosis Code    Hyperlipidemia E78.5    Colon polyps Dr. Jose Cevallos 2011, adenoma 8/16 K63.5    Sjogren's disease, probable Dr. Burt Kothari M35.00    Essential hypertension I10    Arthritis, degenerative knees Dr Corrine Michael M19.90    Gastroesophageal reflux disease without esophagitis K21.9    Allergic rhinitis J30.9    Obesity (BMI 30.0-34. 9) E66.9    Hyperthyroidism E05.90     ASSESSMENT AND PLAN:  1. Allergic rhinitis. Continue current regimen. 2.  GERD.  Continue ppi and avoidance measures  3. Colon polyps. F/U 2021, fiber  4. Hyperlipidemia w FH CHD. Long discussion about calcium score and will schedule  5. Hypertension. Continue current  6. Probable Sjogren's. Continue current and f/u Dr. Aaron Hastings prn  7. Obesity. She'll try IF again  8. Arthritis. Change to celebrex for gi protection  9.  Gyn.  F/u Dr Chitra Delvalle as directed        RTC 7/20      Above conditions discussed at length and patient vocalized understanding.   All questions answered to patient satisfaction    Lipid lowering therapy not prescibed for patient reasons

## 2019-07-12 ENCOUNTER — APPOINTMENT (OUTPATIENT)
Dept: INTERNAL MEDICINE CLINIC | Age: 59
End: 2019-07-12

## 2019-07-13 LAB
ALBUMIN SERPL-MCNC: 4.3 G/DL (ref 3.5–5.5)
ALBUMIN/GLOB SERPL: 1.7 {RATIO} (ref 1.2–2.2)
ALP SERPL-CCNC: 145 IU/L (ref 39–117)
ALT SERPL-CCNC: 13 IU/L (ref 0–32)
AST SERPL-CCNC: 14 IU/L (ref 0–40)
BILIRUB SERPL-MCNC: 0.3 MG/DL (ref 0–1.2)
BUN SERPL-MCNC: 21 MG/DL (ref 6–24)
BUN/CREAT SERPL: 24 (ref 9–23)
CALCIUM SERPL-MCNC: 9.4 MG/DL (ref 8.7–10.2)
CHLORIDE SERPL-SCNC: 101 MMOL/L (ref 96–106)
CHOLEST SERPL-MCNC: 213 MG/DL (ref 100–199)
CO2 SERPL-SCNC: 28 MMOL/L (ref 20–29)
CREAT SERPL-MCNC: 0.87 MG/DL (ref 0.57–1)
ERYTHROCYTE [DISTWIDTH] IN BLOOD BY AUTOMATED COUNT: 13.7 % (ref 12.3–15.4)
GLOBULIN SER CALC-MCNC: 2.5 G/DL (ref 1.5–4.5)
GLUCOSE SERPL-MCNC: 108 MG/DL (ref 65–99)
HCT VFR BLD AUTO: 43.3 % (ref 34–46.6)
HDLC SERPL-MCNC: 58 MG/DL
HGB BLD-MCNC: 14.3 G/DL (ref 11.1–15.9)
INTERPRETATION, 910389: NORMAL
LDLC SERPL CALC-MCNC: 140 MG/DL (ref 0–99)
MCH RBC QN AUTO: 31.4 PG (ref 26.6–33)
MCHC RBC AUTO-ENTMCNC: 33 G/DL (ref 31.5–35.7)
MCV RBC AUTO: 95 FL (ref 79–97)
PLATELET # BLD AUTO: 246 X10E3/UL (ref 150–450)
POTASSIUM SERPL-SCNC: 4.8 MMOL/L (ref 3.5–5.2)
PROT SERPL-MCNC: 6.8 G/DL (ref 6–8.5)
RBC # BLD AUTO: 4.56 X10E6/UL (ref 3.77–5.28)
SODIUM SERPL-SCNC: 141 MMOL/L (ref 134–144)
TRIGL SERPL-MCNC: 74 MG/DL (ref 0–149)
VLDLC SERPL CALC-MCNC: 15 MG/DL (ref 5–40)
WBC # BLD AUTO: 4.7 X10E3/UL (ref 3.4–10.8)

## 2019-07-15 ENCOUNTER — HOSPITAL ENCOUNTER (OUTPATIENT)
Dept: MAMMOGRAPHY | Age: 59
Discharge: HOME | End: 2019-07-15
Attending: INTERNAL MEDICINE
Payer: COMMERCIAL

## 2019-07-15 ENCOUNTER — HOSPITAL ENCOUNTER (OUTPATIENT)
Dept: CT IMAGING | Age: 59
Discharge: HOME | End: 2019-07-15
Attending: INTERNAL MEDICINE
Payer: SELF-PAY

## 2019-07-15 ENCOUNTER — DOCUMENTATION ONLY (OUTPATIENT)
Dept: INTERNAL MEDICINE CLINIC | Age: 59
End: 2019-07-15

## 2019-07-15 DIAGNOSIS — E78.5 HYPERLIPIDEMIA, UNSPECIFIED HYPERLIPIDEMIA TYPE: ICD-10-CM

## 2019-07-15 DIAGNOSIS — I10 ESSENTIAL HYPERTENSION: ICD-10-CM

## 2019-07-15 DIAGNOSIS — Z12.39 BREAST SCREENING, UNSPECIFIED: ICD-10-CM

## 2019-07-15 PROCEDURE — 75571 CT HRT W/O DYE W/CA TEST: CPT

## 2019-07-15 PROCEDURE — 77063 BREAST TOMOSYNTHESIS BI: CPT

## 2019-07-15 NOTE — PROGRESS NOTES
Prior auth approval for pantoprazole. Approval dates from 7/11/19-7/10/20. Approval faxed to pharmacy.

## 2019-07-18 ENCOUNTER — TELEPHONE (OUTPATIENT)
Dept: INTERNAL MEDICINE CLINIC | Age: 59
End: 2019-07-18

## 2019-07-18 DIAGNOSIS — R93.1 AGATSTON CAC SCORE, <100: ICD-10-CM

## 2019-07-18 DIAGNOSIS — E78.5 HYPERLIPIDEMIA, UNSPECIFIED HYPERLIPIDEMIA TYPE: Primary | ICD-10-CM

## 2019-07-18 NOTE — TELEPHONE ENCOUNTER
pls call    Results for orders placed or performed in visit on 46/05/37   METABOLIC PANEL, COMPREHENSIVE   Result Value Ref Range    Glucose 108 (H) 65 - 99 mg/dL    BUN 21 6 - 24 mg/dL    Creatinine 0.87 0.57 - 1.00 mg/dL    GFR est non-AA 73 >59 mL/min/1.73    GFR est AA 84 >59 mL/min/1.73    BUN/Creatinine ratio 24 (H) 9 - 23    Sodium 141 134 - 144 mmol/L    Potassium 4.8 3.5 - 5.2 mmol/L    Chloride 101 96 - 106 mmol/L    CO2 28 20 - 29 mmol/L    Calcium 9.4 8.7 - 10.2 mg/dL    Protein, total 6.8 6.0 - 8.5 g/dL    Albumin 4.3 3.5 - 5.5 g/dL    GLOBULIN, TOTAL 2.5 1.5 - 4.5 g/dL    A-G Ratio 1.7 1.2 - 2.2    Bilirubin, total 0.3 0.0 - 1.2 mg/dL    Alk. phosphatase 145 (H) 39 - 117 IU/L    AST (SGOT) 14 0 - 40 IU/L    ALT (SGPT) 13 0 - 32 IU/L   CBC W/O DIFF   Result Value Ref Range    WBC 4.7 3.4 - 10.8 x10E3/uL    RBC 4.56 3.77 - 5.28 x10E6/uL    HGB 14.3 11.1 - 15.9 g/dL    HCT 43.3 34.0 - 46.6 %    MCV 95 79 - 97 fL    MCH 31.4 26.6 - 33.0 pg    MCHC 33.0 31.5 - 35.7 g/dL    RDW 13.7 12.3 - 15.4 %    PLATELET 414 270 - 555 x10E3/uL   LIPID PANEL   Result Value Ref Range    Cholesterol, total 213 (H) 100 - 199 mg/dL    Triglyceride 74 0 - 149 mg/dL    HDL Cholesterol 58 >39 mg/dL    VLDL, calculated 15 5 - 40 mg/dL    LDL, calculated 140 (H) 0 - 99 mg/dL   CVD REPORT   Result Value Ref Range    INTERPRETATION Note      Ca score was 51  Mild blockages in 2 heart vessels    Gluc elev at 108 - probably prediabetic  ldl-c high at 140    Diet, exercise, wt loss - mail mediterranean diet sheet pls    Ca score qualifies her for statin - she's intol of 4 so far    Would she be wiling to try a 5th?  If yes, suggest pitavastatin  Will call in if agrees

## 2019-07-18 NOTE — TELEPHONE ENCOUNTER
Pt aware of message below and verbalized understanding. No further questions or concerns from pt at this time.          Pt is willing to try a a new statin

## 2019-07-22 NOTE — TELEPHONE ENCOUNTER
Pt aware of message below and verbalized understanding. No further questions or concerns from pt at this time. Pt will call back and schedule 3 month visit.

## 2019-07-25 ENCOUNTER — DOCUMENTATION ONLY (OUTPATIENT)
Dept: INTERNAL MEDICINE CLINIC | Age: 59
End: 2019-07-25

## 2019-08-21 RX ORDER — AMLODIPINE BESYLATE 10 MG/1
10 TABLET ORAL DAILY
Qty: 90 TAB | Refills: 3 | Status: CANCELLED | OUTPATIENT
Start: 2019-08-21

## 2020-02-20 DIAGNOSIS — R93.1 AGATSTON CAC SCORE, <100: ICD-10-CM

## 2020-02-20 DIAGNOSIS — E78.5 HYPERLIPIDEMIA, UNSPECIFIED HYPERLIPIDEMIA TYPE: ICD-10-CM

## 2020-02-20 RX ORDER — PITAVASTATIN CALCIUM 1.04 MG/1
TABLET, FILM COATED ORAL
Qty: 30 TAB | Refills: 5 | Status: SHIPPED | OUTPATIENT
Start: 2020-02-20 | End: 2020-07-16 | Stop reason: SDUPTHER

## 2020-04-09 DIAGNOSIS — K21.9 GASTROESOPHAGEAL REFLUX DISEASE WITHOUT ESOPHAGITIS: ICD-10-CM

## 2020-04-09 RX ORDER — PANTOPRAZOLE SODIUM 40 MG/1
TABLET, DELAYED RELEASE ORAL
Qty: 90 TAB | Refills: 3 | Status: SHIPPED | OUTPATIENT
Start: 2020-04-09 | End: 2020-06-30

## 2020-05-09 DIAGNOSIS — M35.00 SJOGREN'S DISEASE (HCC): ICD-10-CM

## 2020-05-11 RX ORDER — CEVIMELINE HYDROCHLORIDE 30 MG/1
CAPSULE ORAL
Qty: 270 CAP | Refills: 3 | Status: SHIPPED | OUTPATIENT
Start: 2020-05-11 | End: 2020-07-16 | Stop reason: SDUPTHER

## 2020-06-16 ENCOUNTER — OFFICE VISIT (OUTPATIENT)
Dept: FAMILY MEDICINE CLINIC | Facility: CLINIC | Age: 60
End: 2020-06-16

## 2020-06-16 ENCOUNTER — TELEPHONE (OUTPATIENT)
Dept: INTERNAL MEDICINE CLINIC | Age: 60
End: 2020-06-16

## 2020-06-16 VITALS
OXYGEN SATURATION: 97 % | TEMPERATURE: 98.5 F | DIASTOLIC BLOOD PRESSURE: 92 MMHG | SYSTOLIC BLOOD PRESSURE: 156 MMHG | RESPIRATION RATE: 18 BRPM | HEART RATE: 76 BPM

## 2020-06-16 DIAGNOSIS — J06.9 UPPER RESPIRATORY TRACT INFECTION, UNSPECIFIED TYPE: Primary | ICD-10-CM

## 2020-06-16 DIAGNOSIS — R50.9 FEVER, UNKNOWN ORIGIN: ICD-10-CM

## 2020-06-16 DIAGNOSIS — Z71.89 EDUCATED ABOUT COVID-19 VIRUS INFECTION: ICD-10-CM

## 2020-06-16 DIAGNOSIS — R05.9 COUGH: ICD-10-CM

## 2020-06-16 LAB
S PYO AG THROAT QL: NEGATIVE
VALID INTERNAL CONTROL?: YES

## 2020-06-16 RX ORDER — BENZONATATE 100 MG/1
100 CAPSULE ORAL
Qty: 30 CAP | Refills: 0 | Status: SHIPPED | OUTPATIENT
Start: 2020-06-16 | End: 2020-06-26

## 2020-06-16 RX ORDER — ALBUTEROL SULFATE 90 UG/1
1 AEROSOL, METERED RESPIRATORY (INHALATION)
Qty: 1 INHALER | Refills: 0 | Status: SHIPPED | OUTPATIENT
Start: 2020-06-16 | End: 2022-01-17

## 2020-06-16 RX ORDER — AZITHROMYCIN 250 MG/1
TABLET, FILM COATED ORAL
Qty: 6 TAB | Refills: 0 | Status: SHIPPED | OUTPATIENT
Start: 2020-06-16 | End: 2020-06-21

## 2020-06-16 NOTE — LETTER
NOTIFICATION RETURN TO WORK / SCHOOL 
 
6/16/2020 1:22 PM 
 
Ms. Earl Mendoza [address] [facility] To Whom It May Concern: 
 
Earl Mendoza is currently under the care of Ector Ramirez. She will return to work/school on: 06/19/2020 If there are questions or concerns please have the patient contact our office. Sincerely, Camron Hernandez NP

## 2020-06-16 NOTE — PROGRESS NOTES
Chief Complaint   Patient presents with    Cough     dry unproductive onset thursday    Fever     previous temp ranging from 100-101.3, onset saturday pt taking tylenol       SUBJECTIVE:    The patient presents to our specialty flu / COVID-19 clinic with a focused complaint of the following: fever; denies nausea, vomiting and diarrhea; denies  symptoms. ; has respiratory symptoms. nonproductive cough. Patient denies recent travel. Pt exposed to sick contacts under investigations for possible Covid UNKNOWN. She works at [employer] where they are seeing customers face to face. Pt is not a current smoker. OBJECTIVE    Visit Vitals  BP (!) 156/92 (BP 1 Location: Left arm, BP Patient Position: Sitting)   Pulse 76   Temp 98.5 °F (36.9 °C) (Oral)   Resp 18   SpO2 97%      General:  alert, well nourished, cooperative, pleasant and in no apparent distress. Sick but not toxic appearing. Eyes:   The lids are without swelling, lesions, or drainage. The conjunctiva is clear and noninjected. ENT:  ENT exam normal, no neck nodes or sinus tenderness, throat normal without erythema or exudate and airway not compromised. Neck: normal.  Lungs/CV: clear to auscultation, no wheezes or rales and unlabored breathing. Pts cough sounds wet in nature. Pretty harsh sounding as well. Heart: regular rhythm normal rate. Skin:  No rashes, no jaundice. ASSESSMENT / PLAN     ICD-10-CM ICD-9-CM    1. Upper respiratory tract infection, unspecified type J06.9 465.9 azithromycin (ZITHROMAX) 250 mg tablet      albuterol (PROVENTIL HFA, VENTOLIN HFA, PROAIR HFA) 90 mcg/actuation inhaler      benzonatate (TESSALON) 100 mg capsule   2. Fever, unknown origin R50.9 780.60 AMB POC RAPID STREP A      NOVEL CORONAVIRUS (COVID-19)   3. Cough R05 786.2 benzonatate (TESSALON) 100 mg capsule      NOVEL CORONAVIRUS (COVID-19)   4. Educated about COVID-19 virus infection Z71.89 V65.49        not tested for influenza.  negative for strep. Based on CDC recommendations and limited testing supplies, only those patients who meet criteria will be tested for Covid. High priority groups for testing   Symptomatic and/or Exposure /Test for Covid  Immunocompromised host (on prednisone, biological therapy, blood cancer, metastatic cancer or active chemotherapy)   [facility] care worker in the home    Other high-risk group: o age >47   o Uncontrolled DM   o Uncontrolled HTN   o BMI >40, CKD/ESRD    Dialysis patients (patients going to HD units, not asymptomatic home HD/PD)    Anyone living in a congregate setting     Non High-risk patient category           Test for COVID-19   Asymptomatic, no known exposure  No    Asymptomatic, possible exposure  No    Asymptomatic, definite exposure  Provider discretion    Symptomatic, no known exposure  Yes    Symptomatic, + exposure  Yes        Based on this patients symptoms, health history, and the current pandemic situation, I have recommended self quarantine until covid results are available. Patient does  meet criteria for Covid testing. COVID-19 testing was completed. Work note was given until [employer] are available. If Covid testing was completed and is negative, patient may return back to work despite quarantine originally set in place if applicable. Awaiting Covid 19 results at this time. Instructed pt on the importance of rest, fluid intake (with avoidance of red fluids), and vit C supplements. Instructed pt to check temp if possible and to take acetaminophen if fevers are noted, avoid NSAIDs. Remember to wash hands, disinfect surroundings, and avoid touching face. Instructed pt to remain home until Covid results are negative and all symptoms have improved or subsided. If they must leave home, wear a mask. Patient verbalized understanding.     We have provided the patient with a detailed after visit summary which was reviewed, and red flag symptoms that would warrant an ER visit were emphasized. CC'd chart to PCP: Yes: Comment: Miguelangel Alexander     Treating pt for URI/bronchitis with Zpack. Covid testing completed. Dr. Dontae Almodovar, DAQUANP-C, DNP      This visit was provided as a focused evaluation during the COVID -19 pandemic/national emergency. A comprehensive review of all previous patient history and testing was not conducted. Pertinent findings were elicited during the visit.

## 2020-06-16 NOTE — PROGRESS NOTES
Amara Gooden presents today for   Chief Complaint   Patient presents with    Cough     dry unproductive onset thursday    Fever     previous temp ranging from 100-101.3, onset saturday pt taking tylenol       Is someone accompanying this pt? no    Is the patient using any DME equipment during OV? no    Travel and Exposure Screening was performed during check in or rooming process Yes  Yes: Comment: patient hasnt traveled. Depression Screening:  3 most recent PHQ Screens 6/16/2020   Little interest or pleasure in doing things Not at all   Feeling down, depressed, irritable, or hopeless Not at all   Total Score PHQ 2 0       Fall Risk  Fall Risk Assessment, last 12 mths 5/24/2019   Able to walk? Yes   Fall in past 12 months?  No       This Visit Test  Results for orders placed or performed in visit on 06/16/20   AMB POC RAPID STREP A   Result Value Ref Range    VALID INTERNAL CONTROL POC Yes     Group A Strep Ag Negative Negative

## 2020-06-16 NOTE — TELEPHONE ENCOUNTER
----- Message from Cory Landin MD sent at 6/16/2020  9:32 AM EDT -----  Cough and fever go to [facility] - pls schedule there

## 2020-06-18 ENCOUNTER — TELEPHONE (OUTPATIENT)
Dept: FAMILY MEDICINE CLINIC | Facility: CLINIC | Age: 60
End: 2020-06-18

## 2020-06-18 LAB — SARS-COV-2, NAA: NOT DETECTED

## 2020-06-18 NOTE — PROGRESS NOTES
Vlad Watkins, Please notify pt their Covid test was Negative. Remind pt to stay home but if they must leave home take all precautions: wear a mask, continue social distancing, disinfect home/work areas, avoid touching the face, and sanitize hands frequently. If they need a return to work note, please provide. Thank you.

## 2020-06-18 NOTE — TELEPHONE ENCOUNTER
Called patient and verified identity with 2 pt identifiers. Notified of Negative covid results. Informed patient to call if any questions or concerns.

## 2020-06-30 DIAGNOSIS — K21.9 GASTROESOPHAGEAL REFLUX DISEASE WITHOUT ESOPHAGITIS: ICD-10-CM

## 2020-06-30 RX ORDER — PANTOPRAZOLE SODIUM 40 MG/1
TABLET, DELAYED RELEASE ORAL
Qty: 90 TAB | Refills: 3 | Status: SHIPPED | OUTPATIENT
Start: 2020-06-30 | End: 2020-07-16 | Stop reason: SDUPTHER

## 2020-07-09 ENCOUNTER — APPOINTMENT (OUTPATIENT)
Dept: INTERNAL MEDICINE CLINIC | Age: 60
End: 2020-07-09

## 2020-07-09 DIAGNOSIS — E78.5 HYPERLIPIDEMIA, UNSPECIFIED HYPERLIPIDEMIA TYPE: ICD-10-CM

## 2020-07-10 LAB
ALBUMIN SERPL-MCNC: 4.2 G/DL (ref 3.8–4.9)
ALBUMIN/GLOB SERPL: 2 {RATIO} (ref 1.2–2.2)
ALP SERPL-CCNC: 109 IU/L (ref 39–117)
ALT SERPL-CCNC: 17 IU/L (ref 0–32)
AST SERPL-CCNC: 20 IU/L (ref 0–40)
BILIRUB SERPL-MCNC: 0.3 MG/DL (ref 0–1.2)
BUN SERPL-MCNC: 17 MG/DL (ref 8–27)
BUN/CREAT SERPL: 21 (ref 12–28)
CALCIUM SERPL-MCNC: 9 MG/DL (ref 8.7–10.3)
CHLORIDE SERPL-SCNC: 104 MMOL/L (ref 96–106)
CHOLEST SERPL-MCNC: 190 MG/DL (ref 100–199)
CO2 SERPL-SCNC: 24 MMOL/L (ref 20–29)
CREAT SERPL-MCNC: 0.81 MG/DL (ref 0.57–1)
GLOBULIN SER CALC-MCNC: 2.1 G/DL (ref 1.5–4.5)
GLUCOSE SERPL-MCNC: 87 MG/DL (ref 65–99)
HDLC SERPL-MCNC: 64 MG/DL
INTERPRETATION, 910389: NORMAL
LDLC SERPL CALC-MCNC: 98 MG/DL (ref 0–99)
POTASSIUM SERPL-SCNC: 4.5 MMOL/L (ref 3.5–5.2)
PROT SERPL-MCNC: 6.3 G/DL (ref 6–8.5)
SODIUM SERPL-SCNC: 143 MMOL/L (ref 134–144)
TRIGL SERPL-MCNC: 142 MG/DL (ref 0–149)
VLDLC SERPL CALC-MCNC: 28 MG/DL (ref 5–40)

## 2020-07-10 NOTE — PROGRESS NOTES
60 y.o. WHITE OR  female who presents for RPE    Her bp remains controlled on amlo. She had to stop going to the [facility] due to the pandemic; trying to be active mainly golfing and also trying to get back to the [facility]. No cardiovascular complaints    She has done well w the pitavastatin and no sfx to report    The left knee is flaring periodically, she apparently never got a refill of the celebrex, it did help    Watching the intake but no success with attempts at wt loss    Vitals 7/16/2020 6/16/2020 7/10/2019 5/24/2019 6/22/2018   Weight 191 lb  188 lb 190 lb 183 lb     Dr Elisabeth Paiz is thinking of weaning off the tapazole later this fall apparently.   No sx referable to the thyroid    The Sjogren's is being tx'ed w evoxac, not seeing Dr Migdalia Arenas at this time    Past Medical History:   Diagnosis Date    Agatston CAC score, <100 07/2019    ca score 51; mild plaque LAD and RCA    Allergic rhinitis Dr. Pricilla Olivier 2011     Colon adenoma 08/2016    Dr Albin Maynard 2007, 2011, 8/16    DJD (degenerative joint disease)     B knees Dr. Adina Chance FHx: heart disease     Gastritis 07/2017    h pylori+ Dr Randy Flores GERD (gastroesophageal reflux disease) 2007    [facility]    Hyperlipidemia     calculated risk score 1.9% (2/13); elected to take statins due to Fhx, elev hscrp/ldl-p but intol of 4 diff ones    Hypertension     Hyperthyroidism 06/2018    Dr Ghassan Hermosillo Obesity (BMI 30.0-34.9)     peak weight 189 lbs, bmi 31.5 from 12/16; IF 6/18 start weight 183 lbs but not doing    Overweight (BMI 25.0-29.9)     saw Dr Jodine Schlatter; qsymia ineffective 6/14-6/15    Sjogren's disease, probable Dr. Migdalia Arenas 8/12     Past Surgical History:   Procedure Laterality Date    HX APPENDECTOMY  1980s    HX BREAST BIOPSY      right    HX CHOLECYSTECTOMY  1980s    HX COLONOSCOPY      [facility] 2007, Dr. Ronald Maynard 2011, 8/16 adenoma    [facility]      RIGHT TKR Dr Ryanne Potter 2017     [address] Social History     Socioeconomic History    Marital status: SINGLE     Spouse name: Not on file    Number of children: 3    Years of education: Not on file    Highest education level: Not on file   Occupational History    Occupation: [employer]   Social Needs    Financial resource strain: Not on file    Food insecurity     Worry: Not on file     Inability: Not on file   [employer] needs     Medical: Not on file     Non-medical: Not on file   Tobacco Use    Smoking status: Former Smoker     Packs/day: 0.50    Smokeless tobacco: Never Used   Substance and Sexual Activity    Alcohol use:  Yes     Alcohol/week: 1.0 standard drinks     Types: 1 Glasses of wine per week    Drug use: No    Sexual activity: Not on file   Lifestyle    Physical activity     Days per week: Not on file     Minutes per session: Not on file    Stress: Not on file   Relationships    Social connections     Talks on phone: Not on file     Gets together: Not on file     Attends Buddhism service: Not on file     Active member of club or organization: Not on file     Attends meetings of clubs or organizations: Not on file     Relationship status: Not on file    Intimate partner violence     Fear of current or ex partner: Not on file     Emotionally abused: Not on file     Physically abused: Not on file     Forced sexual activity: Not on file   Other Topics Concern    Not on file   Social History Narrative    Not on file     Family History   Problem Relation Age of Onset    Hypertension Mother     Heart Disease Father     Cancer Paternal Grandmother         ovarian    Cancer Paternal Aunt     Ovarian Cancer Maternal Grandmother      Immunization History   Administered Date(s) Administered    Influenza Vaccine (Quad) PF 12/12/2016, 03/13/2018    Influenza Vaccine PF 12/20/2013    TD Vaccine 02/01/2007     Current Outpatient Medications   Medication Sig    cevimeline (EVOXAC) 30 mg capsule Take 1 Cap by mouth three (3) times daily.  pitavastatin calcium (Livalo) 1 mg tab tablet take 1 tablet by mouth daily    methIMAzole (TAPAZOLE) 5 mg tablet take 1 tablet by mouth once daily    pantoprazole (PROTONIX) 40 mg tablet Take 1 Tab by mouth daily.  zolpidem (AMBIEN) 10 mg tablet Take 1 Tab by mouth nightly as needed for Sleep. Max Daily Amount: 10 mg.    amLODIPine (NORVASC) 10 mg tablet Take 1 Tab by mouth daily.  celecoxib (CELEBREX) 200 mg capsule Take 1 Cap by mouth daily as needed for Pain.  albuterol (PROVENTIL HFA, VENTOLIN HFA, PROAIR HFA) 90 mcg/actuation inhaler Take 1 Puff by inhalation every six (6) hours as needed for Cough. No current facility-administered medications for this visit. Allergies   Allergen Reactions    Benazepril Cough    Chantix [Varenicline] Nausea and Vomiting    Codeine Nausea and Vomiting and Other (comments)     Abdominal Pain    Crestor [Rosuvastatin] Myalgia    Lipitor [Atorvastatin] Myalgia    Pravastatin Myalgia    Prevacid [Lansoprazole] Other (comments)     Constipation    Zocor [Simvastatin] Myalgia     REVIEW OF SYSTEMS:  gyn 2017 Dr Antonella Blackwell, [facility] 7/19, [place] 8/16 Dr Ana Eng  no vision change or eye pain  Oral  no mouth pain, tongue or tooth problems  Ears  no hearing loss, ear pain, fullness, no swallowing problems  Cardiac  no CP, PND, orthopnea, edema, palpitations or syncope  Chest  no breast masses  Resp  no wheezing, chronic coughing, dyspnea  Neuro  no focal weakness, numbness, paresthesias, incoordination, ataxia, involuntary movements  Endo - no polyuria, polydipsia, nocturia, hot flashes    Visit Vitals  /89   Pulse 80   Temp 97.2 °F (36.2 °C) (Temporal)   Resp 14   Ht 5' 5\" (1.651 m)   Wt 191 lb (86.6 kg)   SpO2 98%   BMI 31.78 kg/m²     Affect is appropriate. Mood stable  No apparent distress  HEENT --Anicteric sclerae. No thyromegaly, JVD, or bruits. Lungs --Clear to auscultation, normal percussion.   Heart --Regular rate and rhythm, no murmurs, rubs, gallops, or clicks. Chest wall --Nontender to palpation. PMI normal.  Abdomen -- Soft and nontender, no hepatosplenomegaly or masses. Extremities -- Without cyanosis, clubbing, edema. 2+ pulses equally and bilaterally.     LABS  From 10/11 showed gluc 92,   cr 0.91, gfr 73, alt 16,         ldl-p 1793, chol 221, tg 99,   hdl 65, ldl-c 136, wbc 6.1, hb 14.5, plt 249, jim neg, vit d 37.1, esr 5  From 2/13 showed   gluc 95,   cr 0.82, gfr 82, alt <5                                   chol 220, tg 117, hdl 53, ldl-c 144  From 4/13 showed   gluc 85,   cr 0.70,            alt 15, hba1c 5.0, ldl-p 1372, chol 163, tg 68,    hdl 60, ldl-c 104, wbc 5.7, hb 13.8, plt 228, ua neg, hscrp 3.8, uric 2.8, tsh 0.86, vit d 36, ua neg  From 6/13 showed   gluc 111, cr 0.70, gfr 99, alt 12,     chol 160, tg 73,   hdl 47, ldl-c 98  From 12/13 showed gluc 92,   cr 0.74, gfr 92, alt 13, hba1c 5.6, ldl-p 1055, chol 158, tg 82,   hdl 54, ldl-c 88  From 6/14 showed          chol 162, tg 97,   hdl 55, ldl-c 88,   wbc 6.2, hb 13.5, plt 228, ua neg  From 6/15 showed   gluc 98,   cr 1.01, gfr 57, alt<5, hba1c 5.5,    chol 177, tg 128, hdl 53, ldl-c 98,   wbc 5.8, hb 14.3, plt 238, ua neg  From 8/16 showed   gluc 102, cr 0.90, gfr 72, alt 13,     chol 241, tg 98,   hdl 68, ldl-c 156, wbc 4.8, hb 14.3, plt 255, ua neg, hep c neg  From 12/16 showed       alt 14,     chol 215, tg 116, hdl 62, ldl-c 130  From 4/17 showed       alt 12,     chol 189, tg 127, hdl 59, ldl-c 105  From 4/17 showed   gluc 102, cr 0.97, gfr 79,                 wbc 5.6, hb 14.5, plt 291, tsh 1.84, ft4 1.29  From 2/18 showed   gluc 92,   cr 0.86, gfr>60,                 wbc 5.2, hb 12.7, plt 561  From 6/18 showed   gluc 89, cr 0.71, gfr 94, alt 18,      chol 174, tg 152, hdl 42, ldl-c 102, wbc 3.2, hb 12.8, plt 240  From 7/18 showed                              tsh<0.01, ft4 1.96, TRAb+  From 7/19 showed   glu 108, cr 0.87, gfr 73, alt 13,      chol 213, tg 74,   hdl 58, ldl-c 140, wbc 4.7, hb 14.3, plt 246, tsh 1.74    Results for orders placed or performed in visit on 19/00/94   METABOLIC PANEL, COMPREHENSIVE   Result Value Ref Range    Glucose 87 65 - 99 mg/dL    BUN 17 8 - 27 mg/dL    Creatinine 0.81 0.57 - 1.00 mg/dL    GFR est non-AA 79 >59 mL/min/1.73    GFR est AA 91 >59 mL/min/1.73    BUN/Creatinine ratio 21 12 - 28    Sodium 143 134 - 144 mmol/L    Potassium 4.5 3.5 - 5.2 mmol/L    Chloride 104 96 - 106 mmol/L    CO2 24 20 - 29 mmol/L    Calcium 9.0 8.7 - 10.3 mg/dL    Protein, total 6.3 6.0 - 8.5 g/dL    Albumin 4.2 3.8 - 4.9 g/dL    GLOBULIN, TOTAL 2.1 1.5 - 4.5 g/dL    A-G Ratio 2.0 1.2 - 2.2    Bilirubin, total 0.3 0.0 - 1.2 mg/dL    Alk. phosphatase 109 39 - 117 IU/L    AST (SGOT) 20 0 - 40 IU/L    ALT (SGPT) 17 0 - 32 IU/L   LIPID PANEL   Result Value Ref Range    Cholesterol, total 190 100 - 199 mg/dL    Triglyceride 142 0 - 149 mg/dL    HDL Cholesterol 64 >39 mg/dL    VLDL, calculated 28 5 - 40 mg/dL    LDL, calculated 98 0 - 99 mg/dL   CVD REPORT   Result Value Ref Range    INTERPRETATION Note        Patient Active Problem List   Diagnosis Code    Hyperlipidemia E78.5    Colon polyps Dr. Johnson [place] 2011, adenoma 8/16 K63.5    Sjogren's disease, probable Dr. Guillermina Allison M35.00    Essential hypertension I10    Arthritis, degenerative knees Dr Akiko Angel M19.90    Gastroesophageal reflux disease without esophagitis K21.9    Allergic rhinitis J30.9    Obesity (BMI 30.0-34. 9) E66.9    Hyperthyroidism E05.90     ASSESSMENT AND PLAN:  1. Allergic rhinitis. Continue current regimen. 2.  GERD. Continue ppi and avoidance measures  3. Colon polyps. F/U 2021, fiber  4. Hyperlipidemia. Continue pitava  5. Hypertension. Continue current  6. Sjogren's. Continue current and f/u Dr. Guillermina Allison prn  7. Obesity. Lifestyle and dietary measures. Portion control reiterated. 8.  Arthritis. Prn celebrex  9.  Gyn.  F/u Dr Andriy Flynn as directed  10. Hyperthyroidism. Continue current regimen and f/u Dr Linsey Baron        RTC 7/21    Above conditions discussed at length and patient vocalized understanding.   All questions answered to patient satisfaction

## 2020-07-16 ENCOUNTER — OFFICE VISIT (OUTPATIENT)
Dept: INTERNAL MEDICINE CLINIC | Age: 60
End: 2020-07-16

## 2020-07-16 ENCOUNTER — DOCUMENTATION ONLY (OUTPATIENT)
Dept: INTERNAL MEDICINE CLINIC | Age: 60
End: 2020-07-16

## 2020-07-16 VITALS
HEIGHT: 65 IN | DIASTOLIC BLOOD PRESSURE: 89 MMHG | WEIGHT: 191 LBS | BODY MASS INDEX: 31.82 KG/M2 | SYSTOLIC BLOOD PRESSURE: 129 MMHG | OXYGEN SATURATION: 98 % | HEART RATE: 80 BPM | RESPIRATION RATE: 14 BRPM | TEMPERATURE: 97.2 F

## 2020-07-16 DIAGNOSIS — E66.9 OBESITY (BMI 30.0-34.9): ICD-10-CM

## 2020-07-16 DIAGNOSIS — D12.6 ADENOMATOUS POLYP OF COLON, UNSPECIFIED PART OF COLON: ICD-10-CM

## 2020-07-16 DIAGNOSIS — E78.5 HYPERLIPIDEMIA, UNSPECIFIED HYPERLIPIDEMIA TYPE: ICD-10-CM

## 2020-07-16 DIAGNOSIS — Z00.00 PHYSICAL EXAM: Primary | ICD-10-CM

## 2020-07-16 DIAGNOSIS — G47.00 INSOMNIA, UNSPECIFIED TYPE: ICD-10-CM

## 2020-07-16 DIAGNOSIS — M35.00 SJOGREN'S DISEASE (HCC): ICD-10-CM

## 2020-07-16 DIAGNOSIS — I10 ESSENTIAL HYPERTENSION: ICD-10-CM

## 2020-07-16 DIAGNOSIS — E05.90 HYPERTHYROIDISM: ICD-10-CM

## 2020-07-16 DIAGNOSIS — M17.9 OSTEOARTHRITIS OF KNEE, UNSPECIFIED LATERALITY, UNSPECIFIED OSTEOARTHRITIS TYPE: ICD-10-CM

## 2020-07-16 DIAGNOSIS — K21.9 GASTROESOPHAGEAL REFLUX DISEASE WITHOUT ESOPHAGITIS: ICD-10-CM

## 2020-07-16 DIAGNOSIS — R93.1 AGATSTON CAC SCORE, <100: ICD-10-CM

## 2020-07-16 RX ORDER — PANTOPRAZOLE SODIUM 40 MG/1
40 TABLET, DELAYED RELEASE ORAL DAILY
Qty: 90 TAB | Refills: 3 | Status: SHIPPED | OUTPATIENT
Start: 2020-07-16 | End: 2021-10-08

## 2020-07-16 RX ORDER — ZOLPIDEM TARTRATE 10 MG/1
10 TABLET ORAL
Qty: 30 TAB | Refills: 1 | Status: SHIPPED | OUTPATIENT
Start: 2020-07-16 | End: 2021-11-01 | Stop reason: SDUPTHER

## 2020-07-16 RX ORDER — CELECOXIB 200 MG/1
200 CAPSULE ORAL
Qty: 90 CAP | Refills: 1 | Status: SHIPPED | OUTPATIENT
Start: 2020-07-16 | End: 2021-07-28

## 2020-07-16 RX ORDER — AMLODIPINE BESYLATE 10 MG/1
10 TABLET ORAL DAILY
Qty: 90 TAB | Refills: 3 | Status: SHIPPED | OUTPATIENT
Start: 2020-07-16 | End: 2021-10-08

## 2020-07-16 RX ORDER — CEVIMELINE HYDROCHLORIDE 30 MG/1
30 CAPSULE ORAL 3 TIMES DAILY
Qty: 270 CAP | Refills: 3 | Status: SHIPPED | OUTPATIENT
Start: 2020-07-16 | End: 2021-08-02

## 2020-07-16 RX ORDER — METHIMAZOLE 5 MG/1
TABLET ORAL
Qty: 90 TAB | Refills: 3 | Status: SHIPPED | OUTPATIENT
Start: 2020-07-16 | End: 2022-01-17

## 2020-07-16 NOTE — PROGRESS NOTES
PA approval for celebrex.  Approved, Coverage Starts on: 7/16/2020 12:00:00 AM, Coverage Ends on: 7/16/2021 12:00:00 AM

## 2020-08-06 ENCOUNTER — HOSPITAL ENCOUNTER (OUTPATIENT)
Dept: MAMMOGRAPHY | Age: 60
Discharge: HOME | End: 2020-08-06
Attending: INTERNAL MEDICINE
Payer: COMMERCIAL

## 2020-08-06 DIAGNOSIS — Z12.31 VISIT FOR SCREENING MAMMOGRAM: ICD-10-CM

## 2020-08-06 PROCEDURE — 77063 BREAST TOMOSYNTHESIS BI: CPT

## 2020-10-30 ENCOUNTER — DOCUMENTATION ONLY (OUTPATIENT)
Dept: INTERNAL MEDICINE CLINIC | Age: 60
End: 2020-10-30

## 2020-10-30 NOTE — PROGRESS NOTES
Erlinda Cleveland Key: [id number] - PA Case ID: [id number] - Rx #: [id number] Need help? Call us at [phone]   Outcome   Approvedtoday   PA Case: [id number], Status: Approved, Coverage Starts on: 10/30/2020 12:00:00 AM, Coverage Ends on: 10/30/2021 12:00:00 AM.   DrugLivalo 1MG tablets   [facility] Electronic PA Form (2017 NCPDP)   Original Claim Info70,MR     Faxed to pharmacy.

## 2021-07-01 ENCOUNTER — DOCUMENTATION ONLY (OUTPATIENT)
Dept: INTERNAL MEDICINE CLINIC | Age: 61
End: 2021-07-01

## 2021-07-13 ENCOUNTER — TELEPHONE (OUTPATIENT)
Dept: INTERNAL MEDICINE CLINIC | Age: 61
End: 2021-07-13

## 2021-07-13 DIAGNOSIS — I10 ESSENTIAL HYPERTENSION: Primary | ICD-10-CM

## 2021-07-13 DIAGNOSIS — E05.90 HYPERTHYROIDISM: ICD-10-CM

## 2021-07-13 DIAGNOSIS — I10 ESSENTIAL HYPERTENSION: ICD-10-CM

## 2021-07-13 DIAGNOSIS — E66.9 OBESITY (BMI 30.0-34.9): ICD-10-CM

## 2021-07-13 DIAGNOSIS — E78.5 HYPERLIPIDEMIA, UNSPECIFIED HYPERLIPIDEMIA TYPE: ICD-10-CM

## 2021-07-13 NOTE — TELEPHONE ENCOUNTER
Pt going [facility] tomorrow early 7am 07/14/2021 for lab collection. Please order labs this afternoon.  I didn't see any in [facility] care    She has appt with you 07/19/2021

## 2021-07-13 NOTE — PROGRESS NOTES
63 y.o. WHITE/NON- female who presents for RPE    No cardiovascular complaints. She stopped going to the [facility] but has been taking walks 3 times a week, bike rides occasionally recommend bike. She downsized and bought a house so doing a lot of renovations. Watching the intake but no success with attempts at wt loss. Denies polyuria, polydipsia, nocturia, vision change. Not checking sugars at this time. Vitals 7/19/2021 7/16/2020 6/16/2020 7/10/2019 5/24/2019   Weight 187 lb 191 lb  188 lb 190 lb     Dr Juan Antonio Mena weaned off the Tapazole and continues to follow her every 3 months or so. No sx referable to the thyroid    The Sjogren's is being tx'ed w evoxac, not seeing Dr Marylen Michaelis at this time    No GI or  complaints. She switched over to Dr. Cory Burdick.   Due for colonoscopy and she is already been contacted    Lastly, she had mild Covid infection in January and is wondering when she should get the vaccination    Past Medical History:   Diagnosis Date    Agatston CAC score, <100 07/2019    ca score 51; mild plaque LAD and RCA    Allergic rhinitis Dr. Juani Douglas 2011     Colon adenoma 08/2016    Dr Ching Garcia 2007, 2011    COVID-19 virus infection 01/2021    FHx: heart disease     Gastritis 07/2017    h pylori+ Dr Josue Robison GERD (gastroesophageal reflux disease) 2007    [facility]    Hyperlipidemia     calculated risk score 1.9% (2/13); elected to take statins due to Fhx, elev hscrp/ldl-p but intol of 4 diff ones    Hypertension     Hyperthyroidism 06/2018    Dr Juan Antonio Mena; tapazole    IFG (impaired fasting glucose) 2017    Obesity (BMI 30.0-34.9)     peak weight 189 lbs, bmi 31.5 from 12/16; IF 6/18 start weight 183 lbs but not doing    Osteoarthritis of both knees     Dr. Manny Mack Overweight (BMI 25.0-29.9)     saw Dr Christina Villalobos; qsymia ineffective 6/14-6/15    Sjogren's disease, probable Dr. Marylen Michaelis 8/12     Past Surgical History:   Procedure Laterality Date    HX APPENDECTOMY  1980s    HX BREAST BIOPSY Right     HX CHOLECYSTECTOMY  1980s    HX COLONOSCOPY      [facility] 2007, Dr. Nu Sullivan 2011, 8/16 adenoma    HX KNEE REPLACEMENT Right 2017    Dr Daniel Marsh HX TUBAL LIGATION       Social History     Socioeconomic History    Marital status: SINGLE     Spouse name: Not on file    Number of children: 3    Years of education: Not on file    Highest education level: Not on file   Occupational History    Occupation:  [employer]   Tobacco Use    Smoking status: Former Smoker     Packs/day: 0.50    Smokeless tobacco: Never Used   Substance and Sexual Activity    Alcohol use: Yes     Alcohol/week: 1.0 standard drinks     Types: 1 Glasses of wine per week    Drug use: No    Sexual activity: Not on file   Other Topics Concern    Not on file   Social History Narrative    Not on file     Social Determinants of Health     Financial Resource Strain:     Difficulty of Paying Living Expenses:    Food Insecurity:     Worried About Running Out of Food in the Last Year:     [address] in the Last Year:    Transportation Needs:     Lack of Transportation (Medical):      Lack of Transportation (Non-Medical):    Physical Activity:     Days of Exercise per Week:     Minutes of Exercise per Session:    Stress:     Feeling of Stress :    Social Connections:     Frequency of Communication with Friends and Family:     Frequency of Social Gatherings with Friends and Family:     Attends Buddhism Services:     Active Member of Clubs or Organizations:     Attends Club or Organization Meetings:     Marital Status:    Intimate Partner Violence:     Fear of Current or Ex-Partner:     Emotionally Abused:     Physically Abused:     Sexually Abused:      Family History   Problem Relation Age of Onset    Hypertension Mother     Heart Disease Father     Cancer Paternal Grandmother         ovarian    Cancer Paternal [address] Ovarian Cancer Maternal Grandmother Immunization History   Administered Date(s) Administered    Influenza Vaccine [employer]) PF (>6 Mo Flulaval, Fluarix, and >3 Yrs Afluria, Fluzone 75738) 12/12/2016, 03/13/2018    Influenza Vaccine PF 12/20/2013    TD Vaccine 02/01/2007     Current Outpatient Medications   Medication Sig    cevimeline (EVOXAC) 30 mg capsule Take 1 Cap by mouth three (3) times daily.  pitavastatin calcium (Livalo) 1 mg tab tablet take 1 tablet by mouth daily    pantoprazole (PROTONIX) 40 mg tablet Take 1 Tab by mouth daily.  zolpidem (AMBIEN) 10 mg tablet Take 1 Tab by mouth nightly as needed for Sleep. Max Daily Amount: 10 mg.    amLODIPine (NORVASC) 10 mg tablet Take 1 Tab by mouth daily.  celecoxib (CELEBREX) 200 mg capsule Take 1 Cap by mouth daily as needed for Pain.  albuterol (PROVENTIL HFA, VENTOLIN HFA, PROAIR HFA) 90 mcg/actuation inhaler Take 1 Puff by inhalation every six (6) hours as needed for Cough.  methIMAzole (TAPAZOLE) 5 mg tablet take 1 tablet by mouth once daily (Patient not taking: Reported on 7/19/2021)     No current facility-administered medications for this visit.      Allergies   Allergen Reactions    Benazepril Cough    Chantix [Varenicline] Nausea and Vomiting    Codeine Nausea and Vomiting and Other (comments)     Abdominal Pain    Crestor [Rosuvastatin] Myalgia    Lipitor [Atorvastatin] Myalgia    Pravastatin Myalgia    Prevacid [Lansoprazole] Other (comments)     Constipation    Zocor [Simvastatin] Myalgia     REVIEW OF SYSTEMS:  gyn 2021 Dr. Cydney Davison, [facility] 8/20, colo 8/16 Dr Leal [facility]  Ophtho  no vision change or eye pain  Oral  no mouth pain, tongue or tooth problems  Ears  no hearing loss, ear pain, fullness, no swallowing problems  Cardiac  no CP, PND, orthopnea, edema, palpitations or syncope  Chest  no breast masses  Resp  no wheezing, chronic coughing, dyspnea  Neuro  no focal weakness, numbness, paresthesias, incoordination, ataxia, involuntary movements  Endo - no polyuria, polydipsia, nocturia, hot flashes    Visit Vitals  /75   Pulse 78   Temp 97.2 °F (36.2 °C) (Temporal)   Resp 14   Ht 5' 5\" (1.651 m)   Wt 187 lb (84.8 kg)   SpO2 97%   BMI 31.12 kg/m²     Affect is appropriate. Mood stable  No apparent distress  HEENT --Anicteric sclerae. No thyromegaly, JVD, or bruits. Lungs --Clear to auscultation, normal percussion. Heart --Regular rate and rhythm, no murmurs, rubs, gallops, or clicks. Chest wall --Nontender to palpation. PMI normal.  Abdomen -- Soft and nontender, no hepatosplenomegaly or masses. Extremities -- Without cyanosis, clubbing, edema. 2+ pulses equally and bilaterally.     LABS  From 10/11 showed gluc 92,   cr 0.91, gfr 73, alt 16,         ldl-p 1793, chol 221, tg 99,   hdl 65, ldl-c 136, wbc 6.1, hb 14.5, plt 249, jim neg, vit d 37.1, esr 5  From 2/13 showed   gluc 95,   cr 0.82, gfr 82, alt <5                                   chol 220, tg 117, hdl 53, ldl-c 144  From 4/13 showed   gluc 85,   cr 0.70,            alt 15, hba1c 5.0, ldl-p 1372, chol 163, tg 68,    hdl 60, ldl-c 104, wbc 5.7, hb 13.8, plt 228, ua neg, hscrp 3.8, uric 2.8, tsh 0.86, vit d 36, ua neg  From 6/13 showed   gluc 111, cr 0.70, gfr 99, alt 12,     chol 160, tg 73,   hdl 47, ldl-c 98  From 12/13 showed gluc 92,   cr 0.74, gfr 92, alt 13, hba1c 5.6, ldl-p 1055, chol 158, tg 82,   hdl 54, ldl-c 88  From 6/14 showed          chol 162, tg 97,   hdl 55, ldl-c 88,   wbc 6.2, hb 13.5, plt 228, ua neg  From 6/15 showed   gluc 98,   cr 1.01, gfr 57, alt<5, hba1c 5.5,    chol 177, tg 128, hdl 53, ldl-c 98,   wbc 5.8, hb 14.3, plt 238, ua neg  From 8/16 showed   gluc 102, cr 0.90, gfr 72, alt 13,     chol 241, tg 98,   hdl 68, ldl-c 156, wbc 4.8, hb 14.3, plt 255, ua neg, hep c neg  From 12/16 showed       alt 14,     chol 215, tg 116, hdl 62, ldl-c 130  From 4/17 showed       alt 12,     chol 189, tg 127, hdl 59, ldl-c 105  From 4/17 showed   gluc 102, cr 0.97, gfr 79, wbc 5.6, hb 14.5, plt 291, tsh 1.84, ft4 1.29  From 2/18 showed   gluc 92,   cr 0.86, gfr>60,                 wbc 5.2, hb 12.7, plt 561  From 6/18 showed   gluc 89, cr 0.71, gfr 94, alt 18,      chol 174, tg 152, hdl 42, ldl-c 102, wbc 3.2, hb 12.8, plt 240  From 7/18 showed                              tsh<0.01, ft4 1.96, TRAb+  From 7/19 showed   glu 108, cr 0.87, gfr 73, alt 13,      chol 213, tg 74,   hdl 58, ldl-c 140, wbc 4.7, hb 14.3, plt 246, tsh 1.74  From 7/20 showed   gluc 87, cr 0.81, gfr 79, alt 17,      chol 190, tg 142, hdl 64, ldl-c 98    Results for orders placed or performed in visit on 07/14/21   TSH AND FREE T4   Result Value Ref Range    TSH 1.490 0.450 - 4.500 uIU/mL    T4, Free 1.25 0.82 - 1.77 ng/dL   CBC WITH AUTOMATED DIFF   Result Value Ref Range    WBC 5.0 3.4 - 10.8 x10E3/uL    RBC 4.32 3.77 - 5.28 x10E6/uL    HGB 13.5 11.1 - 15.9 g/dL    HCT 41.4 34.0 - 46.6 %    MCV 96 79 - 97 fL    MCH 31.3 26.6 - 33.0 pg    MCHC 32.6 31.5 - 35.7 g/dL    RDW 12.1 11.7 - 15.4 %    PLATELET 462 053 - 371 x10E3/uL    NEUTROPHILS 55 Not Estab. %    Lymphocytes 30 Not Estab. %    MONOCYTES 10 Not Estab. %    EOSINOPHILS 4 Not Estab. %    BASOPHILS 1 Not Estab. %    ABS. NEUTROPHILS 2.7 1.4 - 7.0 x10E3/uL    Abs Lymphocytes 1.5 0.7 - 3.1 x10E3/uL    ABS. MONOCYTES 0.5 0.1 - 0.9 x10E3/uL    ABS. EOSINOPHILS 0.2 0.0 - 0.4 x10E3/uL    ABS. BASOPHILS 0.1 0.0 - 0.2 x10E3/uL    IMMATURE GRANULOCYTES 0 Not Estab. %    ABS. IMM.  GRANS. 0.0 0.0 - 0.1 [id number]/NI   METABOLIC PANEL, COMPREHENSIVE   Result Value Ref Range    Glucose 104 (H) 65 - 99 mg/dL    BUN 13 8 - 27 mg/dL    Creatinine 0.87 0.57 - 1.00 mg/dL    GFR est non-AA 72 >59 mL/min/1.73    GFR est AA 83 >59 mL/min/1.73    BUN/Creatinine ratio 15 12 - 28    Sodium 141 134 - 144 mmol/L    Potassium 4.6 3.5 - 5.2 mmol/L    Chloride 104 96 - 106 mmol/L    CO2 28 20 - 29 mmol/L    Calcium 9.4 8.7 - 10.3 mg/dL    Protein, total 6.3 6.0 - 8.5 g/dL    Albumin 4.3 3.8 - 4.8 g/dL    GLOBULIN, TOTAL 2.0 1.5 - 4.5 g/dL    A-G Ratio 2.2 1.2 - 2.2    Bilirubin, total 0.4 0.0 - 1.2 mg/dL    Alk. phosphatase 111 48 - 121 IU/L    AST (SGOT) 17 0 - 40 IU/L    ALT (SGPT) 18 0 - 32 IU/L   LIPID PANEL   Result Value Ref Range    Cholesterol, total 178 100 - 199 mg/dL    Triglyceride 97 0 - 149 mg/dL    HDL Cholesterol 71 >39 mg/dL    VLDL, calculated 17 5 - 40 mg/dL    LDL, calculated 90 0 - 99 mg/dL   CVD REPORT   Result Value Ref Range    INTERPRETATION Note    SPECIMEN STATUS REPORT   Result Value Ref Range    SPECIMEN STATUS REPORT COMMENT      We reviewed the patient's labs from the last several visits to point out trends in the numbers          Patient Active Problem List   Diagnosis Code    Hyperlipidemia E78.5    Colon polyps Dr. Michael Miller 2011, adenoma 8/16 K63.5    Sjogren's disease, probable Dr. Javy Ferrer M35.00    Essential hypertension I10    Arthritis, degenerative knees Dr Jamey Sanches M19.90    Gastroesophageal reflux disease without esophagitis K21.9    Allergic rhinitis J30.9    Obesity (BMI 30.0-34. 9) E66.9    Hyperthyroidism E05.90    IFG (impaired fasting glucose) R73.01     ASSESSMENT AND PLAN:  1. Allergic rhinitis. Continue current regimen. 2.  GERD. Continue ppi and avoidance measures  3. Colon polyps. F/U 2021, fiber  4. Hyperlipidemia. Continue pitava  5. Hypertension. Continue current  6. Sjogren's. Continue current and f/u Dr. Javy Ferrer prn  7. Obesity. Lifestyle and dietary measures. Discussed IF again, low carb, inc the exercise  8. Arthritis. Prn celebrex  9.  Gyn.  F/u Dr Eddie Oswald  10. Hyperthyroidism. Follow-up Dr. Kinney Sees  11. Long discussion about rationale for Covid vaccination, she will try to get it done in the near future. 12. IFG. Lifestyle and dietary measures. Portion control reiterated, wt loss would be ideal        RTC 7/22    Above conditions discussed at length and patient vocalized understanding.   All questions answered to patient satisfaction

## 2021-07-14 ENCOUNTER — HOSPITAL ENCOUNTER (OUTPATIENT)
Dept: LAB | Age: 61
Discharge: HOME | End: 2021-07-14

## 2021-07-14 LAB — XX-LABCORP SPECIMEN COL,LCBCF: NORMAL

## 2021-07-14 PROCEDURE — 99001 SPECIMEN HANDLING PT-LAB: CPT

## 2021-07-15 LAB
ALBUMIN SERPL-MCNC: 4.3 G/DL (ref 3.8–4.8)
ALBUMIN/GLOB SERPL: 2.2 {RATIO} (ref 1.2–2.2)
ALP SERPL-CCNC: 111 IU/L (ref 48–121)
ALT SERPL-CCNC: 18 IU/L (ref 0–32)
AST SERPL-CCNC: 17 IU/L (ref 0–40)
BASOPHILS # BLD AUTO: 0.1 X10E3/UL (ref 0–0.2)
BASOPHILS NFR BLD AUTO: 1 %
BILIRUB SERPL-MCNC: 0.4 MG/DL (ref 0–1.2)
BUN SERPL-MCNC: 13 MG/DL (ref 8–27)
BUN/CREAT SERPL: 15 (ref 12–28)
CALCIUM SERPL-MCNC: 9.4 MG/DL (ref 8.7–10.3)
CHLORIDE SERPL-SCNC: 104 MMOL/L (ref 96–106)
CHOLEST SERPL-MCNC: 178 MG/DL (ref 100–199)
CO2 SERPL-SCNC: 28 MMOL/L (ref 20–29)
CREAT SERPL-MCNC: 0.87 MG/DL (ref 0.57–1)
EOSINOPHIL # BLD AUTO: 0.2 X10E3/UL (ref 0–0.4)
EOSINOPHIL NFR BLD AUTO: 4 %
ERYTHROCYTE [DISTWIDTH] IN BLOOD BY AUTOMATED COUNT: 12.1 % (ref 11.7–15.4)
GLOBULIN SER CALC-MCNC: 2 G/DL (ref 1.5–4.5)
GLUCOSE SERPL-MCNC: 104 MG/DL (ref 65–99)
HCT VFR BLD AUTO: 41.4 % (ref 34–46.6)
HDLC SERPL-MCNC: 71 MG/DL
HGB BLD-MCNC: 13.5 G/DL (ref 11.1–15.9)
IMM GRANULOCYTES # BLD AUTO: 0 X10E3/UL (ref 0–0.1)
IMM GRANULOCYTES NFR BLD AUTO: 0 %
IMP & REVIEW OF LAB RESULTS: NORMAL
LDLC SERPL CALC-MCNC: 90 MG/DL (ref 0–99)
LYMPHOCYTES # BLD AUTO: 1.5 X10E3/UL (ref 0.7–3.1)
LYMPHOCYTES NFR BLD AUTO: 30 %
MCH RBC QN AUTO: 31.3 PG (ref 26.6–33)
MCHC RBC AUTO-ENTMCNC: 32.6 G/DL (ref 31.5–35.7)
MCV RBC AUTO: 96 FL (ref 79–97)
MONOCYTES # BLD AUTO: 0.5 X10E3/UL (ref 0.1–0.9)
MONOCYTES NFR BLD AUTO: 10 %
NEUTROPHILS # BLD AUTO: 2.7 X10E3/UL (ref 1.4–7)
NEUTROPHILS NFR BLD AUTO: 55 %
PLATELET # BLD AUTO: 241 X10E3/UL (ref 150–450)
POTASSIUM SERPL-SCNC: 4.6 MMOL/L (ref 3.5–5.2)
PROT SERPL-MCNC: 6.3 G/DL (ref 6–8.5)
RBC # BLD AUTO: 4.32 X10E6/UL (ref 3.77–5.28)
SODIUM SERPL-SCNC: 141 MMOL/L (ref 134–144)
SPECIMEN STATUS REPORT, ROLRST: NORMAL
T4 FREE SERPL-MCNC: 1.25 NG/DL (ref 0.82–1.77)
TRIGL SERPL-MCNC: 97 MG/DL (ref 0–149)
TSH SERPL DL<=0.005 MIU/L-ACNC: 1.49 UIU/ML (ref 0.45–4.5)
VLDLC SERPL CALC-MCNC: 17 MG/DL (ref 5–40)
WBC # BLD AUTO: 5 X10E3/UL (ref 3.4–10.8)

## 2021-07-19 ENCOUNTER — OFFICE VISIT (OUTPATIENT)
Dept: INTERNAL MEDICINE CLINIC | Age: 61
End: 2021-07-19
Payer: COMMERCIAL

## 2021-07-19 VITALS
DIASTOLIC BLOOD PRESSURE: 75 MMHG | BODY MASS INDEX: 31.16 KG/M2 | SYSTOLIC BLOOD PRESSURE: 127 MMHG | OXYGEN SATURATION: 97 % | HEART RATE: 78 BPM | HEIGHT: 65 IN | TEMPERATURE: 97.2 F | WEIGHT: 187 LBS | RESPIRATION RATE: 14 BRPM

## 2021-07-19 DIAGNOSIS — Z00.00 PHYSICAL EXAM: Primary | ICD-10-CM

## 2021-07-19 DIAGNOSIS — E66.9 OBESITY (BMI 30.0-34.9): ICD-10-CM

## 2021-07-19 DIAGNOSIS — E78.5 HYPERLIPIDEMIA, UNSPECIFIED HYPERLIPIDEMIA TYPE: ICD-10-CM

## 2021-07-19 DIAGNOSIS — R73.01 IFG (IMPAIRED FASTING GLUCOSE): ICD-10-CM

## 2021-07-19 DIAGNOSIS — Z00.00 PHYSICAL EXAM: ICD-10-CM

## 2021-07-19 DIAGNOSIS — M35.00 SJOGREN'S SYNDROME, WITH UNSPECIFIED ORGAN INVOLVEMENT (HCC): ICD-10-CM

## 2021-07-19 DIAGNOSIS — I10 ESSENTIAL HYPERTENSION: ICD-10-CM

## 2021-07-19 DIAGNOSIS — D12.6 ADENOMATOUS POLYP OF COLON, UNSPECIFIED PART OF COLON: ICD-10-CM

## 2021-07-19 DIAGNOSIS — E05.90 HYPERTHYROIDISM: ICD-10-CM

## 2021-07-19 PROCEDURE — 99396 PREV VISIT EST AGE 40-64: CPT | Performed by: INTERNAL MEDICINE

## 2021-07-19 NOTE — PROGRESS NOTES
Franci Burgess presents today for   Chief Complaint   Patient presents with    Physical    Hypertension    Labs     7-14-21       Coordination of Care:  1. Have you been to the ER, urgent care clinic since your last visit? Hospitalized since your last visit? NO    2. Have you seen or consulted any other health care providers outside of the [address] [place] [address] since your last visit? Include any pap smears or colon screening.  YES

## 2021-07-22 ENCOUNTER — TRANSCRIBE ORDER (OUTPATIENT)
Dept: SCHEDULING | Age: 61
End: 2021-07-22

## 2021-07-22 DIAGNOSIS — Z12.31 VISIT FOR SCREENING MAMMOGRAM: Primary | ICD-10-CM

## 2021-07-27 DIAGNOSIS — M17.9 OSTEOARTHRITIS OF KNEE, UNSPECIFIED LATERALITY, UNSPECIFIED OSTEOARTHRITIS TYPE: ICD-10-CM

## 2021-07-28 RX ORDER — CELECOXIB 200 MG/1
CAPSULE ORAL
Qty: 90 CAPSULE | Refills: 1 | Status: SHIPPED | OUTPATIENT
Start: 2021-07-28 | End: 2022-01-17

## 2021-08-02 DIAGNOSIS — M35.00 SJOGREN'S DISEASE (HCC): ICD-10-CM

## 2021-08-02 RX ORDER — CEVIMELINE HYDROCHLORIDE 30 MG/1
CAPSULE ORAL
Qty: 270 CAPSULE | Refills: 3 | Status: SHIPPED | OUTPATIENT
Start: 2021-08-02 | End: 2022-08-08

## 2021-08-15 DIAGNOSIS — R93.1 AGATSTON CAC SCORE, <100: ICD-10-CM

## 2021-08-15 DIAGNOSIS — E78.5 HYPERLIPIDEMIA, UNSPECIFIED HYPERLIPIDEMIA TYPE: ICD-10-CM

## 2021-09-16 DIAGNOSIS — M25.562 LEFT KNEE PAIN, UNSPECIFIED CHRONICITY: Primary | ICD-10-CM

## 2021-10-01 DIAGNOSIS — M25.562 LEFT KNEE PAIN, UNSPECIFIED CHRONICITY: ICD-10-CM

## 2021-10-06 DIAGNOSIS — K21.9 GASTROESOPHAGEAL REFLUX DISEASE WITHOUT ESOPHAGITIS: ICD-10-CM

## 2021-10-07 ENCOUNTER — HOSPITAL ENCOUNTER (OUTPATIENT)
Dept: MAMMOGRAPHY | Age: 61
Discharge: HOME | End: 2021-10-07
Attending: INTERNAL MEDICINE
Payer: COMMERCIAL

## 2021-10-07 DIAGNOSIS — Z12.31 VISIT FOR SCREENING MAMMOGRAM: ICD-10-CM

## 2021-10-07 PROCEDURE — 77063 BREAST TOMOSYNTHESIS BI: CPT

## 2021-10-08 ENCOUNTER — OFFICE VISIT (OUTPATIENT)
Dept: ORTHOPEDIC SURGERY | Age: 61
End: 2021-10-08
Payer: COMMERCIAL

## 2021-10-08 VITALS — WEIGHT: 185 LBS | BODY MASS INDEX: 30.82 KG/M2 | HEIGHT: 65 IN

## 2021-10-08 DIAGNOSIS — M25.562 LEFT KNEE PAIN, UNSPECIFIED CHRONICITY: ICD-10-CM

## 2021-10-08 DIAGNOSIS — M17.12 OSTEOARTHRITIS OF LEFT KNEE, UNSPECIFIED OSTEOARTHRITIS TYPE: ICD-10-CM

## 2021-10-08 DIAGNOSIS — M25.562 LEFT KNEE PAIN, UNSPECIFIED CHRONICITY: Primary | ICD-10-CM

## 2021-10-08 PROCEDURE — 20611 DRAIN/INJ JOINT/BURSA W/US: CPT | Performed by: ORTHOPAEDIC SURGERY

## 2021-10-08 PROCEDURE — 99203 OFFICE O/P NEW LOW 30 MIN: CPT | Performed by: ORTHOPAEDIC SURGERY

## 2021-10-08 RX ORDER — TRIAMCINOLONE ACETONIDE 40 MG/ML
40 INJECTION, SUSPENSION INTRA-ARTICULAR; INTRAMUSCULAR ONCE
Status: COMPLETED | OUTPATIENT
Start: 2021-10-08 | End: 2021-10-08

## 2021-10-08 RX ORDER — LIDOCAINE HYDROCHLORIDE 10 MG/ML
9 INJECTION INFILTRATION; PERINEURAL ONCE
Status: COMPLETED | OUTPATIENT
Start: 2021-10-08 | End: 2021-10-08

## 2021-10-08 RX ORDER — AMLODIPINE BESYLATE 10 MG/1
TABLET ORAL
Qty: 90 TABLET | Refills: 3 | Status: SHIPPED | OUTPATIENT
Start: 2021-10-08 | End: 2022-10-12

## 2021-10-08 RX ORDER — PANTOPRAZOLE SODIUM 40 MG/1
TABLET, DELAYED RELEASE ORAL
Qty: 90 TABLET | Refills: 3 | Status: SHIPPED | OUTPATIENT
Start: 2021-10-08 | End: 2022-10-12

## 2021-10-08 RX ADMIN — TRIAMCINOLONE ACETONIDE 40 MG: 40 INJECTION, SUSPENSION INTRA-ARTICULAR; INTRAMUSCULAR at 14:00

## 2021-10-08 RX ADMIN — LIDOCAINE HYDROCHLORIDE 9 ML: 10 INJECTION INFILTRATION; PERINEURAL at 14:00

## 2021-10-08 NOTE — LETTER
10/11/2021    Patient: Pato Noguera   YOB: 1960   Date of Visit: 10/8/2021     Annalisa Shannon MD  [address] [facility]  [address] [place] [address]  [facility]    Dear Annalisa Shannon MD,      Thank you for referring Ms. Suzan Gibbons to [address] [place] [address] AND [facility] for evaluation. My notes for this consultation are attached. If you have questions, please do not hesitate to call me. I look forward to following your patient along with you.       Sincerely,    Geo Garcia MD

## 2021-10-08 NOTE — PATIENT INSTRUCTIONS
Knee Arthritis: Care Instructions  Your Care Instructions     Knee arthritis is a breakdown of the cartilage that cushions your knee joint. When the cartilage wears down, your bones rub against each other. This causes pain and stiffness. Knee arthritis tends to get worse with time. Treatment for knee arthritis involves reducing pain, making the leg muscles stronger, and staying at a healthy body weight. The treatment usually does not improve the health of the cartilage, but it can reduce pain and improve how well your knee works. You can take simple measures to protect your knee joints, ease your pain, and help you stay active. Follow-up care is a key part of your treatment and safety. Be sure to make and go to all appointments, and call your doctor if you are having problems. It's also a good idea to know your test results and keep a list of the medicines you take. How can you care for yourself at home? · Know that knee arthritis will cause more pain on some days than on others. · Stay at a healthy weight. Lose weight if you are overweight. When you stand up, the pressure on your knees from every pound of body weight is multiplied four times. So if you lose 10 pounds, you will reduce the pressure on your knees by 40 pounds. · Talk to your doctor or physical therapist about exercises that will help ease joint pain. ? Stretch to help prevent stiffness and to prevent injury before you exercise. You may enjoy gentle forms of yoga to help keep your knee joints and muscles flexible. ? Walk instead of jog.  ? Ride a bike. This makes your thigh muscles stronger and takes pressure off your knee. ? Wear well-fitting and comfortable shoes. ? Exercise in chest-deep water. This can help you exercise longer with less pain. ? Avoid exercises that include squatting or kneeling. They can put a lot of strain on your knees.   ? Talk to your doctor to make sure that the exercise you do is not making the arthritis worse.  · Do not sit for long periods of time. Try to walk once in a while to keep your knee from getting stiff. · Ask your doctor or physical therapist whether shoe inserts may reduce your arthritis pain. · If you can afford it, get new athletic shoes at least every year. This can help reduce the strain on your knees. · Use a device to help you do everyday activities. ? A cane or walking stick can help you keep your balance when you walk. Hold the cane or walking stick in the hand opposite the painful knee. ? If you feel like you may fall when you walk, try using crutches or a front-wheeled walker. These can prevent falls that could cause more damage to your knee. ? A knee brace may help keep your knee stable and prevent pain. ? You also can use other things to make life easier, such as a higher toilet seat and handrails in the bathtub or shower. · Take pain medicines exactly as directed. ? Do not wait until you are in severe pain. You will get better results if you take it sooner. ? If you are not taking a prescription pain medicine, take an over-the-counter medicine such as acetaminophen (Tylenol), ibuprofen (Advil, Motrin), or naproxen (Aleve). Read and follow all instructions on the label. ? Do not take two or more pain medicines at the same time unless the doctor told you to. Many pain medicines have acetaminophen, which is Tylenol. Too much acetaminophen (Tylenol) can be harmful. ? Tell your doctor if you take a blood thinner, have diabetes, or have allergies to shellfish. · Ask your doctor if you might benefit from a shot of steroid medicine into your knee. This may provide pain relief for several months. · Many people take the supplements glucosamine and chondroitin for osteoarthritis. Some people feel they help, but the medical research does not show that they work. Talk to your doctor before you take these supplements. When should you call for help?    Call your doctor now or seek immediate medical care if:    · You have sudden swelling, warmth, or pain in your knee.     · You have knee pain and a fever or rash.     · You have such bad pain that you cannot use your knee. Watch closely for changes in your health, and be sure to contact your doctor if you have any problems. Where can you learn more? Go to http://www.gray.com/  Enter W187 in the search box to learn more about \"Knee Arthritis: Care Instructions. \"  Current as of: April 30, 2021               Content Version: 13.0  © 8570-6370 [employer]. Care instructions adapted under license by [employer] (which disclaims liability or warranty for this information). If you have questions about a medical condition or this instruction, always ask your healthcare professional. [address] any warranty or liability for your use of this information.

## 2021-10-08 NOTE — LETTER
Vivian Palomino 1960  
[id number]  
 
 
10/8/2021 I hereby authorize and direct Jorge Horne MD, Saul Butcher, and whomever he may designate as his associate to perform upon myself the following procedure: 
 
Injection of: Kenalog, Supartz, Euflexxa, Orthovisc in the Right/Left ____________________. If any unforeseen condition arises in the course of the procedure, I further authorize him and his associated and/or assistant(s) to do whatever he/she deems advisable. The nature, purpose, benefits, risks, side effects, likelihood of achieving goals, and potential problems that might occur during recuperation, risks for not receiving the proposed care, treatment and services and alternatives of the procedure have been fully explained to me by my physician including, but not limited to: 
 
Swelling, joint pain, skin pigment changes, worsening of condition, and failure to improve. I acknowledge that no guarantee or assurance has been made to me as to the results that may be obtained or the likelihood of success. _______________________________________ Signature of patient or authorized representative [employer] [facility] fax: [phone]

## 2021-10-08 NOTE — PROGRESS NOTES
Name: Ivone Flynn    : 1960     Service Dept: [address] [facility] Sports Medicine    Patient's Pharmacies:    [facility] [facility], [address] [place] [address]  J[place] Resendiz [id number]  Phone: [phone] Fax: [phone]       Chief Complaint   Patient presents with    Knee Pain        Visit Vitals  Ht 5' 5\" (1.651 m)   Wt 185 lb (83.9 kg)   BMI 30.79 kg/m²      Allergies   Allergen Reactions    Benazepril Cough    Chantix [Varenicline] Nausea and Vomiting    Codeine Nausea and Vomiting and Other (comments)     Abdominal Pain    Crestor [Rosuvastatin] Myalgia    Lipitor [Atorvastatin] Myalgia    Pravastatin Myalgia    Prevacid [Lansoprazole] Other (comments)     Constipation    Zocor [Simvastatin] Myalgia      Current Outpatient Medications   Medication Sig Dispense Refill    pantoprazole (PROTONIX) 40 mg tablet take 1 tablet by mouth once daily 90 Tablet 3    amLODIPine (NORVASC) 10 mg tablet take 1 tablet by mouth once daily 90 Tablet 3    pitavastatin calcium (Livalo) 1 mg tab tablet take 1 tablet by mouth once daily 90 Tablet 3    cevimeline (EVOXAC) 30 mg capsule take 1 capsule by mouth three times a day 270 Capsule 3    celecoxib (CELEBREX) 200 mg capsule take 1 capsule once daily if needed for pain 90 Capsule 1    methIMAzole (TAPAZOLE) 5 mg tablet take 1 tablet by mouth once daily (Patient not taking: Reported on 2021) 90 Tab 3    zolpidem (AMBIEN) 10 mg tablet Take 1 Tab by mouth nightly as needed for Sleep. Max Daily Amount: 10 mg. 30 Tab 1    albuterol (PROVENTIL HFA, VENTOLIN HFA, PROAIR HFA) 90 mcg/actuation inhaler Take 1 Puff by inhalation every six (6) hours as needed for Cough.  1 Inhaler 0     Current Facility-Administered Medications   Medication Dose Route Frequency Provider Last Rate Last Admin    [COMPLETED] lidocaine (XYLOCAINE) 10 mg/mL (1 %) injection 9 mL  9 mL Other ONCE Mckeon, Jorge CHRISTINE MD   9 mL at 10/08/21 1400    [COMPLETED] triamcinolone acetonide (KENALOG-40) 40 mg/mL injection 40 mg  40 mg Intra artICUlar ONCE Kadie CHRISTINE MD   40 mg at 10/08/21 1400      Patient Active Problem List   Diagnosis Code    Hyperlipidemia E78.5    Colon polyps Dr. Jeremy Newman , adenoma  K63.5    Sjogren's disease, probable Dr. Kalpana Emerson M35.00    Essential hypertension I10    Arthritis, degenerative knees Dr Kacy Ernandez M19.90    Gastroesophageal reflux disease without esophagitis K21.9    Allergic rhinitis J30.9    Obesity (BMI 30.0-34. 9) E66.9    Hyperthyroidism E05.90    IFG (impaired fasting glucose) R73.01      Family History   Problem Relation Age of Onset    Hypertension Mother     Heart Disease Father     Cancer Paternal Grandmother         ovarian    Cancer Paternal Aunt     Ovarian Cancer Maternal Grandmother       Social History     Socioeconomic History    Marital status: SINGLE     Spouse name: Not on file    Number of children: 3    Years of education: Not on file    Highest education level: Not on file   Occupational History    Occupation: [employer]   Tobacco Use    Smoking status: Former Smoker     Packs/day: 0.50     Years: 15.00     Pack years: 7.50     Quit date: 2000     Years since quittin.7    Smokeless tobacco: Never Used   Vaping Use    Vaping Use: Never used   Substance and Sexual Activity    Alcohol use: Yes     Alcohol/week: 1.0 standard drinks     Types: 1 Glasses of wine per week    Drug use: No    Sexual activity: Not Currently     Social Determinants of Health     Financial Resource Strain:     Difficulty of Paying Living Expenses:    Food Insecurity:     Worried About Running Out of Food in the Last Year:     Ran Out of Food in the Last Year:    Transportation Needs:     Lack of Transportation (Medical):      Lack of Transportation (Non-Medical):    Physical Activity:     Days of Exercise per Week:     Minutes of Exercise per Session:    Stress:     Feeling of Stress :    Social Connections:     Frequency of Communication with Friends and Family:     Frequency of Social Gatherings with Friends and Family:     Attends Christian Services:     Active Member of Clubs or Organizations:     Attends Club or Organization Meetings:     Marital Status:       Past Surgical History:   Procedure Laterality Date    HX APPENDECTOMY  1980s    HX BREAST BIOPSY Right     HX CHOLECYSTECTOMY  1980s    HX COLONOSCOPY      [address], Dr. Nelly Boateng 2011, 8/16 adenoma    HX KNEE REPLACEMENT Right 2017    Dr Lottie Feliz        Past Medical History:   Diagnosis Date    Agatston CAC score, <100 07/2019    ca score 51; mild plaque LAD and RCA    Allergic rhinitis Dr. Garrett Saldana 2011     Colon adenoma 08/2016    Dr Alex Boateng 2007, 2011    COVID-19 virus infection 01/2021    FHx: heart disease     Gastritis 07/2017    h pylori+ Dr Kenyatta Singh GERD (gastroesophageal reflux disease) 2007    [facility]    Hyperlipidemia     calculated risk score 1.9% (2/13); elected to take statins due to Fhx, elev hscrp/ldl-p but intol of 4 diff ones    Hypertension     Hyperthyroidism 06/2018    Dr Carlyn Diaz; tapazole    IFG (impaired fasting glucose) 2017    Obesity (BMI 30.0-34.9)     peak weight 189 lbs, bmi 31.5 from 12/16; IF 6/18 start weight 183 lbs but not doing    Osteoarthritis of both knees     Dr. David Richardson Overweight (BMI 25.0-29.9)     saw Dr Yuniel Sher; qsymia ineffective 6/14-6/15    Sjogren's disease, probable Dr. Fontenot Screen 8/12        I have reviewed and agree with [facility] and intake form in chart and the record furthermore I have reviewed prior medical record(s) regarding this patients care during this appointment.      Review of Systems:   Patient is a pleasant appearing individual, appropriately dressed, well hydrated, well nourished, who is alert, appropriately oriented for age, and in no acute distress with a normal gait and normal affect who does not appear to be in any significant pain. Physical Exam:  Left Knee -Decrease range of motion with flexion, Knee arc of greater than 50 degrees, Some crepitation, Grossly neurovascularly intact, Good cap refill, No skin lesion, Moderate swelling, No gross instability, Some quadriceps weakness, Kellgren and Donald at least grade 3    Right Knee - Full Range of Motion, No crepitation, Grossly neurovascularly intact, Good cap refill, No skin lesion, No swelling, No gross instability, No quadriceps weakness    Procedure Documentation:    I discussed in detail the risks, benefits and complications of an injection which included but are not limited to infection, skin reactions, hot swollen joint, and anaphylaxis with the patient. The patient verbalized understanding and gave informed consent for the injection. The patient's knee was flexed to 90° and the skin prepped using sterile alcohol solution. A sterile needle was inserted into the left knee and the mixture of 9 mL Lidocaine 1%, 1 mL Kenalog 40 mg was injected under sterile technique. The needle was withdrawn and the puncture site sealed with a Band-Aid. Technique: Under sterile conditions a [employer] ultrasound unit with a variable frequency (7.0-14.0 MHz) linear transducer was used to localize the placement of needle into the left knee joint. Findings: Successful needle placement for knee injection. Final images were taken and saved for permanent record. The patient tolerated the injection well. The patient was instructed to call the office immediately if there is any pain, redness, warmth, fever, or chills. Encounter Diagnoses     ICD-10-CM ICD-9-CM   1. Left knee pain, unspecified chronicity  M25.562 719.46   2.  Osteoarthritis of left knee, unspecified osteoarthritis type  M17.12 715.96       HPI:  The patient is here with a chief complaint of left knee pain, dull, throbbing pain, progressively getting worse. Pain is 5/10. X-rays of the left knee are positive for severe OA. Failed conservative treatment. Assessment/Plan:  Plan at this point, we will give her cortisone injection for temporary relief. For left total knee replacement, general medical clearance, outpatient surgery, and we will go from there. As part of continued conservative pain management options the patient was advised to utilize Tylenol or OTC NSAIDS as long as it is not medically contraindicated. Return to Office: Follow-up and Dispositions    · Return for schedule for surgery. Administrations This Visit     lidocaine (XYLOCAINE) 10 mg/mL (1 %) injection 9 mL     Admin Date  10/08/2021 Action  Given Dose  9 mL Route  Other Administered By  Ramy Meier LPN          triamcinolone acetonide (KENALOG-40) 40 mg/mL injection 40 mg     Admin Date  10/08/2021 Action  Given Dose  40 mg Route  Intra artICUlar Administered By  Ramy Meier LPN               Scribed by Floyd Anders LPN as dictated by [facility] - [facility] [place] RAHUL Young MD.  Documentation True and Accepted Jorge RAHUL Young MD

## 2021-10-19 ENCOUNTER — PATIENT MESSAGE (OUTPATIENT)
Dept: INTERNAL MEDICINE CLINIC | Age: 61
End: 2021-10-19

## 2021-10-19 DIAGNOSIS — M17.9 OSTEOARTHRITIS OF KNEE, UNSPECIFIED LATERALITY, UNSPECIFIED OSTEOARTHRITIS TYPE: Primary | ICD-10-CM

## 2021-10-20 NOTE — TELEPHONE ENCOUNTER
From: Ewa Bautista  To: Ranjith Coronado MD  Sent: 10/19/2021 2:22 PM EDT  Subject: Non-Urgent Medical Question    Dr. Zhao Desai,  As of August 14, 2021, my knee is now \"shot\". I don't know what other word to use for it. I can barely walk - I am in constant pain - and I can't sleep. I made an appointment to see Dr. Minerva Donald; I have heard amazing things about him and the fact that he does the \"jiffy knee\" and doesn't cut muscle was very appealing to me. I saw Dr. Minerva Donald on 10/8 and have scheduled my knee replacement surgery for 2/17/22 (his first available appointment). I am writing you because I don't know if I can make it for almost 4 more months. Do you have any recommendations to help me until I can have this surgery? Dr. Minerva Donald gave me a cortisone shot during my visit - it didn't help. He told me that I can take ibuprofen and Tylenol - neither seem to help. Is there a brace that I can use - I tried several but the braces seem to aggravate my knee and the pain is worse. I've switched beds in my house - which seems to help me sleep a little better (moved to a softer mattress). Any help would be greatly appreciated.   Cleven Drilling

## 2021-10-22 RX ORDER — DULOXETIN HYDROCHLORIDE 30 MG/1
30 CAPSULE, DELAYED RELEASE ORAL DAILY
Qty: 30 CAPSULE | Refills: 5 | Status: SHIPPED | OUTPATIENT
Start: 2021-10-22 | End: 2022-07-13

## 2021-11-01 DIAGNOSIS — G47.00 INSOMNIA, UNSPECIFIED TYPE: ICD-10-CM

## 2021-11-02 NOTE — TELEPHONE ENCOUNTER
[facility] last [facility] date for Ambien as 10/13/20 for a 30 d/s. Last Visit: 7/19/21 with MD Freitas Poag  Next Appointment: 7/19/22 with MD Freitas Poag  Previous Refill Encounter(s): 7/16/20 #30 with 1 refill    Requested Prescriptions     Pending Prescriptions Disp Refills    zolpidem (AMBIEN) 10 mg tablet 30 Tablet 1     Sig: Take 1 Tablet by mouth nightly as needed for Sleep. Max Daily Amount: 10 mg.

## 2021-11-03 RX ORDER — ZOLPIDEM TARTRATE 10 MG/1
10 TABLET ORAL
Qty: 30 TABLET | Refills: 1 | Status: SHIPPED | OUTPATIENT
Start: 2021-11-03 | End: 2022-03-07 | Stop reason: SDUPTHER

## 2021-11-05 DIAGNOSIS — G47.00 INSOMNIA, UNSPECIFIED TYPE: ICD-10-CM

## 2021-11-05 RX ORDER — ZOLPIDEM TARTRATE 10 MG/1
10 TABLET ORAL
Qty: 30 TABLET | Refills: 1 | Status: CANCELLED | OUTPATIENT
Start: 2021-11-05

## 2021-11-23 ENCOUNTER — DOCUMENTATION ONLY (OUTPATIENT)
Dept: INTERNAL MEDICINE CLINIC | Age: 61
End: 2021-11-23

## 2021-11-23 NOTE — PROGRESS NOTES
Prior Auth for pitavastatin calcium (Livalo) 1 mg tab tablet   Status: Approved, Coverage Starts on: 11/23/2021 12:00:00 AM, Coverage Ends on: 11/23/2022 12:00:00 AM.

## 2022-01-07 ENCOUNTER — HOSPITAL ENCOUNTER (OUTPATIENT)
Dept: LAB | Age: 62
Discharge: HOME | End: 2022-01-07
Payer: COMMERCIAL

## 2022-01-07 ENCOUNTER — HOSPITAL ENCOUNTER (OUTPATIENT)
Dept: LAB | Age: 62
Discharge: HOME | End: 2022-01-07

## 2022-01-07 ENCOUNTER — HOSPITAL ENCOUNTER (OUTPATIENT)
Dept: GENERAL RADIOLOGY | Age: 62
Discharge: HOME | End: 2022-01-07
Payer: COMMERCIAL

## 2022-01-07 DIAGNOSIS — M17.12 OSTEOARTHRITIS OF LEFT KNEE, UNSPECIFIED OSTEOARTHRITIS TYPE: ICD-10-CM

## 2022-01-07 DIAGNOSIS — M25.562 LEFT KNEE PAIN, UNSPECIFIED CHRONICITY: ICD-10-CM

## 2022-01-07 DIAGNOSIS — Z00.00 PHYSICAL EXAM: ICD-10-CM

## 2022-01-07 LAB
ATRIAL RATE: 69 BPM
CALCULATED P AXIS, ECG09: 31 DEGREES
CALCULATED R AXIS, ECG10: 36 DEGREES
CALCULATED T AXIS, ECG11: 28 DEGREES
DIAGNOSIS, 93000: NORMAL
P-R INTERVAL, ECG05: 126 MS
Q-T INTERVAL, ECG07: 394 MS
QRS DURATION, ECG06: 78 MS
QTC CALCULATION (BEZET), ECG08: 422 MS
VENTRICULAR RATE, ECG03: 69 BPM
XX-LABCORP SPECIMEN COL,LCBCF: NORMAL

## 2022-01-07 PROCEDURE — 93005 ELECTROCARDIOGRAM TRACING: CPT

## 2022-01-07 PROCEDURE — 71046 X-RAY EXAM CHEST 2 VIEWS: CPT

## 2022-01-07 PROCEDURE — 99001 SPECIMEN HANDLING PT-LAB: CPT

## 2022-01-14 DIAGNOSIS — M17.9 OSTEOARTHRITIS OF KNEE, UNSPECIFIED LATERALITY, UNSPECIFIED OSTEOARTHRITIS TYPE: ICD-10-CM

## 2022-01-17 ENCOUNTER — OFFICE VISIT (OUTPATIENT)
Dept: INTERNAL MEDICINE CLINIC | Age: 62
End: 2022-01-17
Payer: COMMERCIAL

## 2022-01-17 VITALS
RESPIRATION RATE: 16 BRPM | BODY MASS INDEX: 32.15 KG/M2 | OXYGEN SATURATION: 94 % | TEMPERATURE: 97.2 F | WEIGHT: 193 LBS | DIASTOLIC BLOOD PRESSURE: 81 MMHG | HEIGHT: 65 IN | HEART RATE: 79 BPM | SYSTOLIC BLOOD PRESSURE: 128 MMHG

## 2022-01-17 DIAGNOSIS — M17.9 OSTEOARTHRITIS OF KNEE, UNSPECIFIED LATERALITY, UNSPECIFIED OSTEOARTHRITIS TYPE: ICD-10-CM

## 2022-01-17 DIAGNOSIS — Z01.818 PREOP EXAM FOR INTERNAL MEDICINE: Primary | ICD-10-CM

## 2022-01-17 DIAGNOSIS — I10 ESSENTIAL HYPERTENSION: ICD-10-CM

## 2022-01-17 DIAGNOSIS — R73.01 IFG (IMPAIRED FASTING GLUCOSE): ICD-10-CM

## 2022-01-17 PROCEDURE — 99214 OFFICE O/P EST MOD 30 MIN: CPT | Performed by: INTERNAL MEDICINE

## 2022-01-17 RX ORDER — CELECOXIB 200 MG/1
CAPSULE ORAL
Qty: 90 CAPSULE | Refills: 1 | Status: SHIPPED | OUTPATIENT
Start: 2022-01-17 | End: 2022-07-06

## 2022-01-17 NOTE — PROGRESS NOTES
Constance Krause presents with   Chief Complaint   Patient presents with    Pre-op Exam     Scheduled 2-17-22 with dr. Jose Cruz Castro- Left TKA          1. \"Have you been to the ER, urgent care clinic since your last visit? Hospitalized since your last visit? \" YES, 12-28-21 [facility] for Cough    2. \"Have you seen or consulted any other health care providers outside of the [address] [place] [address] since your last visit? \" YES, Archana    3. For patients aged 39-70: Has the patient had a colonoscopy? No     If the patient is female:    4. For patients aged 41-77: Has the patient had a mammogram within the past 2 years? Yes - no Care Gap present    5. For patients aged 21-65: Has the patient had a pap smear?  Yes - no Care Gap present

## 2022-02-09 DIAGNOSIS — M25.562 LEFT KNEE PAIN, UNSPECIFIED CHRONICITY: Primary | ICD-10-CM

## 2022-02-09 DIAGNOSIS — M17.12 OSTEOARTHRITIS OF LEFT KNEE, UNSPECIFIED OSTEOARTHRITIS TYPE: ICD-10-CM

## 2022-02-10 ENCOUNTER — OFFICE VISIT (OUTPATIENT)
Dept: ORTHOPEDIC SURGERY | Age: 62
End: 2022-02-10
Payer: COMMERCIAL

## 2022-02-10 ENCOUNTER — ANESTHESIA EVENT (OUTPATIENT)
Dept: SURGERY | Age: 62
End: 2022-02-10
Payer: COMMERCIAL

## 2022-02-10 ENCOUNTER — HOSPITAL ENCOUNTER (OUTPATIENT)
Dept: LAB | Age: 62
Discharge: HOME | End: 2022-02-10
Payer: COMMERCIAL

## 2022-02-10 ENCOUNTER — HOSPITAL ENCOUNTER (OUTPATIENT)
Dept: PREADMISSION TESTING | Age: 62
Discharge: HOME | End: 2022-02-10
Payer: COMMERCIAL

## 2022-02-10 ENCOUNTER — TRANSCRIBE ORDER (OUTPATIENT)
Dept: REGISTRATION | Age: 62
End: 2022-02-10

## 2022-02-10 DIAGNOSIS — M17.12 OSTEOARTHRITIS OF LEFT KNEE, UNSPECIFIED OSTEOARTHRITIS TYPE: ICD-10-CM

## 2022-02-10 DIAGNOSIS — M25.562 LEFT KNEE PAIN: ICD-10-CM

## 2022-02-10 DIAGNOSIS — M25.562 LEFT KNEE PAIN, UNSPECIFIED CHRONICITY: Primary | ICD-10-CM

## 2022-02-10 DIAGNOSIS — M25.562 LEFT KNEE PAIN: Primary | ICD-10-CM

## 2022-02-10 LAB — SARS-COV-2, COV2: NORMAL

## 2022-02-10 PROCEDURE — U0003 INFECTIOUS AGENT DETECTION BY NUCLEIC ACID (DNA OR RNA); SEVERE ACUTE RESPIRATORY SYNDROME CORONAVIRUS 2 (SARS-COV-2) (CORONAVIRUS DISEASE [COVID-19]), AMPLIFIED PROBE TECHNIQUE, MAKING USE OF HIGH THROUGHPUT TECHNOLOGIES AS DESCRIBED BY CMS-2020-01-R: HCPCS

## 2022-02-10 PROCEDURE — 99214 OFFICE O/P EST MOD 30 MIN: CPT | Performed by: ORTHOPAEDIC SURGERY

## 2022-02-10 RX ORDER — HYDROMORPHONE HYDROCHLORIDE 4 MG/1
4 TABLET ORAL
Qty: 30 TABLET | Refills: 0 | Status: SHIPPED | OUTPATIENT
Start: 2022-02-10 | End: 2022-02-20

## 2022-02-10 RX ORDER — MAGNESIUM SULFATE 100 %
4 CRYSTALS MISCELLANEOUS AS NEEDED
Status: CANCELLED | OUTPATIENT
Start: 2022-02-10

## 2022-02-10 RX ORDER — CEPHALEXIN 500 MG/1
500 CAPSULE ORAL EVERY 8 HOURS
Qty: 9 CAPSULE | Refills: 0 | Status: SHIPPED | OUTPATIENT
Start: 2022-02-10 | End: 2022-02-13

## 2022-02-10 RX ORDER — ONDANSETRON 4 MG/1
4 TABLET, ORALLY DISINTEGRATING ORAL
Qty: 20 TABLET | Refills: 0 | Status: SHIPPED | OUTPATIENT
Start: 2022-02-10 | End: 2022-07-13

## 2022-02-10 RX ORDER — DEXTROSE 50 % IN WATER (D50W) INTRAVENOUS SYRINGE
25-50 AS NEEDED
Status: CANCELLED | OUTPATIENT
Start: 2022-02-10

## 2022-02-10 NOTE — H&P (VIEW-ONLY)
Name: Bhupendra Conroy    : 1960     Service Dept: [address] [place] [address] Sports Medicine    Chief Complaint   Patient presents with    Pre-op Exam    Knee Pain        There were no vitals taken for this visit. Allergies   Allergen Reactions    Benazepril Cough    Chantix [Varenicline] Nausea and Vomiting    Codeine Nausea and Vomiting and Other (comments)     Abdominal Pain    Crestor [Rosuvastatin] Myalgia    Lipitor [Atorvastatin] Myalgia    Pravastatin Myalgia    Prevacid [Lansoprazole] Other (comments)     Constipation    Zocor [Simvastatin] Myalgia        Current Outpatient Medications   Medication Sig Dispense Refill    celecoxib (CELEBREX) 200 mg capsule take 1 capsule once daily if needed for pain 90 Capsule 1    zolpidem (AMBIEN) 10 mg tablet Take 1 Tablet by mouth nightly as needed for Sleep. Max Daily Amount: 10 mg. 30 Tablet 1    DULoxetine (CYMBALTA) 30 mg capsule Take 1 Capsule by mouth daily. 30 Capsule 5    pantoprazole (PROTONIX) 40 mg tablet take 1 tablet by mouth once daily 90 Tablet 3    amLODIPine (NORVASC) 10 mg tablet take 1 tablet by mouth once daily 90 Tablet 3    pitavastatin calcium (Livalo) 1 mg tab tablet take 1 tablet by mouth once daily 90 Tablet 3    cevimeline (EVOXAC) 30 mg capsule take 1 capsule by mouth three times a day 270 Capsule 3      Patient Active Problem List   Diagnosis Code    Hyperlipidemia E78.5    Colon polyps Dr. Kary Guzman , adenoma  K63.5    Sjogren's disease, probable Dr. Stephanie Perez M35.00    Essential hypertension I10    Arthritis, degenerative knees Dr Roque Bang M19.90    Gastroesophageal reflux disease without esophagitis K21.9    Allergic rhinitis J30.9    Obesity (BMI 30.0-34. 9) E66.9    Hyperthyroidism E05.90    IFG (impaired fasting glucose) R73.01      Family History   Problem Relation Age of Onset    Hypertension Mother     Heart Disease Father     Cancer Paternal Grandmother         ovarian  Cancer Paternal Aunt     Ovarian Cancer Maternal Grandmother       Social History     Socioeconomic History    Marital status: SINGLE    Number of children: 3   Occupational History    Occupation: [employer]   Tobacco Use    Smoking status: Former Smoker     Packs/day: 0.50     Years: 15.00     Pack years: 7.50     Quit date: 2000     Years since quittin.1    Smokeless tobacco: Never Used   Vaping Use    Vaping Use: Never used   Substance and Sexual Activity    Alcohol use:  Yes     Alcohol/week: 1.0 standard drink     Types: 1 Glasses of wine per week    Drug use: No    Sexual activity: Not Currently      Past Surgical History:   Procedure Laterality Date    HX APPENDECTOMY      HX BREAST BIOPSY Right     HX CHOLECYSTECTOMY      HX COLONOSCOPY      [facility] , Dr. Violette Ding ,  adenoma    HX KNEE REPLACEMENT Right 2017    Dr Silva Gallegos     [address]        Past Medical History:   Diagnosis Date    Agatston CAC score, <100 2019    ca score 51; mild plaque LAD and RCA    Allergic rhinitis Dr. Shubham Healy      Colon adenoma 2016    Dr William Ding ,     COVID-19 virus infection 2021    FHx: heart disease     Gastritis 2017    h pylori+ Dr Elicia Garrett GERD (gastroesophageal reflux disease)     [facility]    Hyperlipidemia     calculated risk score 1.9% (); elected to take statins due to Fhx, elev hscrp/ldl-p but intol of 4 diff ones    Hypertension     Hyperthyroidism 2018    Dr Courtney Gee; tapazole    IFG (impaired fasting glucose)     Obesity (BMI 30.0-34.9)     peak weight 189 lbs, bmi 31.5 from ; IF  start weight 183 lbs but not doing    Osteoarthritis of both knees     Dr. Kodi Crooks Overweight (BMI 25.0-29.9)     saw Dr Cecilia Galarza; qsymia ineffective -6/15    Sjogren's disease, probable Dr. Mert Abraham         I have reviewed and agree with [facility] and intake form in chart and the record furthermore I have reviewed prior medical record(s) regarding this patients care during this appointment. Review of Systems:   Patient is a pleasant appearing individual, appropriately dressed, well hydrated, well nourished, who is alert, appropriately oriented for age, and in no acute distress with a normal gait and normal affect who does not appear to be in any significant pain. Physical Exam:  Left Knee -Decrease range of motion with flexion, Knee arc of greater than 50 degrees, Some crepitation, Grossly neurovascularly intact, Good cap refill, No skin lesion, Moderate swelling, No gross instability, Some quadriceps weakness, Kellgren and Donald at least grade 3    Right Knee - Full Range of Motion, No crepitation, Grossly neurovascularly intact, Good cap refill, No skin lesion, No swelling, No gross instability, No quadriceps weakness    Procedure Documentation:    I discussed in detail the risks, benefits and complications of an injection which included but are not limited to infection, skin reactions, hot swollen joint, and anaphylaxis with the patient. The patient verbalized understanding and gave informed consent for the injection. The patient's knee was flexed to 90° and the skin prepped using sterile alcohol solution. A sterile needle was inserted into the left knee and the mixture of 9 mL Lidocaine 1%, 1 mL Kenalog 40 mg was injected under sterile technique. The needle was withdrawn and the puncture site sealed with a Band-Aid. Technique: Under sterile conditions a [employer] ultrasound unit with a variable frequency (7.0-14.0 MHz) linear transducer was used to localize the placement of needle into the left knee joint. Findings: Successful needle placement for knee injection. Final images were taken and saved for permanent record. The patient tolerated the injection well.  The patient was instructed to call the office immediately if there is any pain, redness, warmth, fever, or chills. Encounter Diagnoses     ICD-10-CM ICD-9-CM   1. Left knee pain, unspecified chronicity  M25.562 719.46   2. Osteoarthritis of left knee, unspecified osteoarthritis type  M17.12 715.96       HPI:  The patient is here with a chief complaint of left knee pain, throbbing, burning pain, progressively getting worse. Pain is 5/10. X-rays of the left knee are positive for severe OA. Failed conservative treatment. Assessment/Plan:  Plan will be for left total knee replacement, Dilaudid, Keflex, Zofran and aspirin for DVT prophylaxis. No history of blood clots, blood thinners or any surgical complications in the past.      As part of continued conservative pain management options the patient was advised to utilize Tylenol or OTC NSAIDS as long as it is not medically contraindicated. Return to Office: Follow-up and Dispositions    · Return for already scheduled for surgery. Scribed by Farrah Thacker LPN as dictated by [facility] - [facility] RESPON[facility] [place] RAHUL Vega MD.  Documentation True and Accepted Jorge RAHUL Vega MD

## 2022-02-10 NOTE — PATIENT INSTRUCTIONS
Knee Arthritis: Care Instructions  Your Care Instructions     Knee arthritis is a breakdown of the cartilage that cushions your knee joint. When the cartilage wears down, your bones rub against each other. This causes pain and stiffness. Knee arthritis tends to get worse with time. Treatment for knee arthritis involves reducing pain, making the leg muscles stronger, and staying at a healthy body weight. The treatment usually does not improve the health of the cartilage, but it can reduce pain and improve how well your knee works. You can take simple measures to protect your knee joints, ease your pain, and help you stay active. Follow-up care is a key part of your treatment and safety. Be sure to make and go to all appointments, and call your doctor if you are having problems. It's also a good idea to know your test results and keep a list of the medicines you take. How can you care for yourself at home? · Know that knee arthritis will cause more pain on some days than on others. · Stay at a healthy weight. Lose weight if you are overweight. When you stand up, the pressure on your knees from every pound of body weight is multiplied four times. So if you lose 10 pounds, you will reduce the pressure on your knees by 40 pounds. · Talk to your doctor or physical therapist about exercises that will help ease joint pain. ? Stretch to help prevent stiffness and to prevent injury before you exercise. You may enjoy gentle forms of yoga to help keep your knee joints and muscles flexible. ? Walk instead of jog.  ? Ride a bike. This makes your thigh muscles stronger and takes pressure off your knee. ? Wear well-fitting and comfortable shoes. ? Exercise in chest-deep water. This can help you exercise longer with less pain. ? Avoid exercises that include squatting or kneeling. They can put a lot of strain on your knees.   ? Talk to your doctor to make sure that the exercise you do is not making the arthritis worse.  · Do not sit for long periods of time. Try to walk once in a while to keep your knee from getting stiff. · Ask your doctor or physical therapist whether shoe inserts may reduce your arthritis pain. · If you can afford it, get new athletic shoes at least every year. This can help reduce the strain on your knees. · Use a device to help you do everyday activities. ? A cane or walking stick can help you keep your balance when you walk. Hold the cane or walking stick in the hand opposite the painful knee. ? If you feel like you may fall when you walk, try using crutches or a front-wheeled walker. These can prevent falls that could cause more damage to your knee. ? A knee brace may help keep your knee stable and prevent pain. ? You also can use other things to make life easier, such as a higher toilet seat and handrails in the bathtub or shower. · Take pain medicines exactly as directed. ? Do not wait until you are in severe pain. You will get better results if you take it sooner. ? If you are not taking a prescription pain medicine, take an over-the-counter medicine such as acetaminophen (Tylenol), ibuprofen (Advil, Motrin), or naproxen (Aleve). Read and follow all instructions on the label. ? Do not take two or more pain medicines at the same time unless the doctor told you to. Many pain medicines have acetaminophen, which is Tylenol. Too much acetaminophen (Tylenol) can be harmful. ? Tell your doctor if you take a blood thinner, have diabetes, or have allergies to shellfish. · Ask your doctor if you might benefit from a shot of steroid medicine into your knee. This may provide pain relief for several months. · Many people take the supplements glucosamine and chondroitin for osteoarthritis. Some people feel they help, but the medical research does not show that they work. Talk to your doctor before you take these supplements. When should you call for help?    Call your doctor now or seek immediate medical care if:    · You have sudden swelling, warmth, or pain in your knee.     · You have knee pain and a fever or rash.     · You have such bad pain that you cannot use your knee. Watch closely for changes in your health, and be sure to contact your doctor if you have any problems. Where can you learn more? Go to http://www.gray.com/  Enter W187 in the search box to learn more about \"Knee Arthritis: Care Instructions. \"  Current as of: April 30, 2021               Content Version: 13.0  © 3069-0813 [employer]. Care instructions adapted under license by [employer] (which disclaims liability or warranty for this information). If you have questions about a medical condition or this instruction, always ask your healthcare professional. [address] any warranty or liability for your use of this information.

## 2022-02-10 NOTE — LETTER
2/11/2022    Patient: Giana Soto   YOB: 1960   Date of Visit: 2/10/2022     Mio Obregon MD  [address] [facility] [place]  [address] [place] [address]    Dear Mio Obregon MD,      Thank you for referring Ms. Steven Turcios to [address] [place] [address] AND [facility] for evaluation. My notes for this consultation are attached. If you have questions, please do not hesitate to call me. I look forward to following your patient along with you.       Sincerely,    Cielo Kaur MD

## 2022-02-10 NOTE — PROGRESS NOTES
Name: Sam Jamil    : 1960     Service Dept: [id number] [facility]    Chief Complaint   Patient presents with    Pre-op Exam    Knee Pain        There were no vitals taken for this visit. Allergies   Allergen Reactions    Benazepril Cough    Chantix [Varenicline] Nausea and Vomiting    Codeine Nausea and Vomiting and Other (comments)     Abdominal Pain    Crestor [Rosuvastatin] Myalgia    Lipitor [Atorvastatin] Myalgia    Pravastatin Myalgia    Prevacid [Lansoprazole] Other (comments)     Constipation    Zocor [Simvastatin] Myalgia        Current Outpatient Medications   Medication Sig Dispense Refill    celecoxib (CELEBREX) 200 mg capsule take 1 capsule once daily if needed for pain 90 Capsule 1    zolpidem (AMBIEN) 10 mg tablet Take 1 Tablet by mouth nightly as needed for Sleep. Max Daily Amount: 10 mg. 30 Tablet 1    DULoxetine (CYMBALTA) 30 mg capsule Take 1 Capsule by mouth daily. 30 Capsule 5    pantoprazole (PROTONIX) 40 mg tablet take 1 tablet by mouth once daily 90 Tablet 3    amLODIPine (NORVASC) 10 mg tablet take 1 tablet by mouth once daily 90 Tablet 3    pitavastatin calcium (Livalo) 1 mg tab tablet take 1 tablet by mouth once daily 90 Tablet 3    cevimeline (EVOXAC) 30 mg capsule take 1 capsule by mouth three times a day 270 Capsule 3      Patient Active Problem List   Diagnosis Code    Hyperlipidemia E78.5    Colon polyps Dr. Dora Foote , adenoma  K63.5    Sjogren's disease, probable Dr. Maurice Vyas M35.00    Essential hypertension I10    Arthritis, degenerative knees Dr Yvrose Bowles M19.90    Gastroesophageal reflux disease without esophagitis K21.9    Allergic rhinitis J30.9    Obesity (BMI 30.0-34. 9) E66.9    Hyperthyroidism E05.90    IFG (impaired fasting glucose) R73.01      Family History   Problem Relation Age of Onset    Hypertension Mother     Heart Disease Father     Cancer Paternal Grandmother         ovarian  Cancer Paternal Aunt     Ovarian Cancer Maternal Grandmother       Social History     Socioeconomic History    Marital status: SINGLE    Number of children: 3   Occupational History    Occupation: Well   Tobacco Use    Smoking status: Former Smoker     Packs/day: 0.50     Years: 15.00     Pack years: 7.50     Quit date: 2000     Years since quittin.1    Smokeless tobacco: Never Used   Vaping Use    Vaping Use: Never used   Substance and Sexual Activity    Alcohol use:  Yes     Alcohol/week: 1.0 standard drink     Types: 1 Glasses of wine per week    Drug use: No    Sexual activity: Not Currently      Past Surgical History:   Procedure Laterality Date    HX APPENDECTOMY      HX BREAST BIOPSY Right     HX CHOLECYSTECTOMY      HX COLONOSCOPY      [facility] , Dr. Otilio Garcia ,  adenoma    HX KNEE REPLACEMENT Right 2017    Dr Shantel Ji     [address]        Past Medical History:   Diagnosis Date    Agatston CAC score, <100 2019    ca score 51; mild plaque LAD and RCA    Allergic rhinitis Dr. Tim Corrales      Colon adenoma 2016    Dr Sabra Garcia ,     COVID-19 virus infection 2021    FHx: heart disease     Gastritis 2017    h pylori+ Dr Eugenia Yan GERD (gastroesophageal reflux disease)     [facility]    Hyperlipidemia     calculated risk score 1.9% (); elected to take statins due to Fhx, elev hscrp/ldl-p but intol of 4 diff ones    Hypertension     Hyperthyroidism 2018    Dr Prakash Nash; tapazole    IFG (impaired fasting glucose)     Obesity (BMI 30.0-34.9)     peak weight 189 lbs, bmi 31.5 from ; IF  start weight 183 lbs but not doing    Osteoarthritis of both knees     Dr. Rani Casillas Overweight (BMI 25.0-29.9)     saw Dr Carmelita Wise; qsymia ineffective -6/15    Sjogren's disease, probable Dr. Daylin Rosales         I have reviewed and agree with [facility] and intake form in chart and the record furthermore I have reviewed prior medical record(s) regarding this patients care during this appointment. Review of Systems:   Patient is a pleasant appearing individual, appropriately dressed, well hydrated, well nourished, who is alert, appropriately oriented for age, and in no acute distress with a normal gait and normal affect who does not appear to be in any significant pain. Physical Exam:  Left Knee -Decrease range of motion with flexion, Knee arc of greater than 50 degrees, Some crepitation, Grossly neurovascularly intact, Good cap refill, No skin lesion, Moderate swelling, No gross instability, Some quadriceps weakness, Kellgren and Donald at least grade 3    Right Knee - Full Range of Motion, No crepitation, Grossly neurovascularly intact, Good cap refill, No skin lesion, No swelling, No gross instability, No quadriceps weakness    Procedure Documentation:    I discussed in detail the risks, benefits and complications of an injection which included but are not limited to infection, skin reactions, hot swollen joint, and anaphylaxis with the patient. The patient verbalized understanding and gave informed consent for the injection. The patient's knee was flexed to 90° and the skin prepped using sterile alcohol solution. A sterile needle was inserted into the left knee and the mixture of 9 mL Lidocaine 1%, 1 mL Kenalog 40 mg was injected under sterile technique. The needle was withdrawn and the puncture site sealed with a Band-Aid. Technique: Under sterile conditions a H-umus ultrasound unit with a variable frequency (7.0-14.0 MHz) linear transducer was used to localize the placement of needle into the left knee joint. Findings: Successful needle placement for knee injection. Final images were taken and saved for permanent record. The patient tolerated the injection well.  The patient was instructed to call the office immediately if there is any pain, redness, warmth, fever, or chills. Encounter Diagnoses     ICD-10-CM ICD-9-CM   1. Left knee pain, unspecified chronicity  M25.562 719.46   2. Osteoarthritis of left knee, unspecified osteoarthritis type  M17.12 715.96       HPI:  The patient is here with a chief complaint of left knee pain, throbbing, burning pain, progressively getting worse. Pain is 5/10. X-rays of the left knee are positive for severe OA. Failed conservative treatment. Assessment/Plan:  Plan will be for left total knee replacement, Dilaudid, Keflex, Zofran and aspirin for DVT prophylaxis. No history of blood clots, blood thinners or any surgical complications in the past.      As part of continued conservative pain management options the patient was advised to utilize Tylenol or OTC NSAIDS as long as it is not medically contraindicated. Return to Office: Follow-up and Dispositions    · Return for already scheduled for surgery. Scribed by Nhung Ha LPN as dictated by Shamar Stephenson MD.  Documentation True and Accepted Jorge Stephenson MD

## 2022-02-11 LAB
BACTERIA SPEC CULT: ABNORMAL
BACTERIA SPEC CULT: ABNORMAL
SARS-COV-2, NAA: NOT DETECTED
SPECIAL REQUESTS,SREQ: ABNORMAL

## 2022-02-17 ENCOUNTER — APPOINTMENT (OUTPATIENT)
Dept: GENERAL RADIOLOGY | Age: 62
End: 2022-02-17
Attending: NURSE PRACTITIONER
Payer: COMMERCIAL

## 2022-02-17 ENCOUNTER — ANESTHESIA (OUTPATIENT)
Dept: SURGERY | Age: 62
End: 2022-02-17
Payer: COMMERCIAL

## 2022-02-17 ENCOUNTER — HOSPITAL ENCOUNTER (OUTPATIENT)
Age: 62
Discharge: HOME | End: 2022-02-17
Attending: ORTHOPAEDIC SURGERY | Admitting: NURSE PRACTITIONER
Payer: COMMERCIAL

## 2022-02-17 ENCOUNTER — TELEPHONE (OUTPATIENT)
Dept: ORTHOPEDIC SURGERY | Age: 62
End: 2022-02-17

## 2022-02-17 VITALS
HEART RATE: 62 BPM | RESPIRATION RATE: 15 BRPM | WEIGHT: 196.2 LBS | OXYGEN SATURATION: 93 % | SYSTOLIC BLOOD PRESSURE: 119 MMHG | DIASTOLIC BLOOD PRESSURE: 68 MMHG | BODY MASS INDEX: 32.65 KG/M2 | TEMPERATURE: 97.2 F

## 2022-02-17 DIAGNOSIS — Z96.652 STATUS POST LEFT KNEE REPLACEMENT: Primary | ICD-10-CM

## 2022-02-17 PROBLEM — M17.9 OA (OSTEOARTHRITIS) OF KNEE: Status: ACTIVE | Noted: 2022-02-17

## 2022-02-17 PROCEDURE — 77030029372 HC ADH SKN CLSR PRINEO J&J -C: Performed by: ORTHOPAEDIC SURGERY

## 2022-02-17 PROCEDURE — 74011250636 HC RX REV CODE- 250/636: Performed by: NURSE ANESTHETIST, CERTIFIED REGISTERED

## 2022-02-17 PROCEDURE — 76210000063 HC OR PH I REC FIRST 0.5 HR: Performed by: ORTHOPAEDIC SURGERY

## 2022-02-17 PROCEDURE — 64450 NJX AA&/STRD OTHER PN/BRANCH: CPT | Performed by: NURSE ANESTHETIST, CERTIFIED REGISTERED

## 2022-02-17 PROCEDURE — 77030038692 HC WND DEB SYS IRMX -B: Performed by: ORTHOPAEDIC SURGERY

## 2022-02-17 PROCEDURE — C1776 JOINT DEVICE (IMPLANTABLE): HCPCS | Performed by: ORTHOPAEDIC SURGERY

## 2022-02-17 PROCEDURE — 76060000034 HC ANESTHESIA 1.5 TO 2 HR: Performed by: ORTHOPAEDIC SURGERY

## 2022-02-17 PROCEDURE — 74011250636 HC RX REV CODE- 250/636: Performed by: NURSE PRACTITIONER

## 2022-02-17 PROCEDURE — 77030013079 HC BLNKT BAIR HGGR 3M -A: Performed by: NURSE ANESTHETIST, CERTIFIED REGISTERED

## 2022-02-17 PROCEDURE — 77030003601 HC NDL NRV BLK BBMI -A: Performed by: NURSE ANESTHETIST, CERTIFIED REGISTERED

## 2022-02-17 PROCEDURE — 76010000153 HC OR TIME 1.5 TO 2 HR: Performed by: ORTHOPAEDIC SURGERY

## 2022-02-17 PROCEDURE — 97161 PT EVAL LOW COMPLEX 20 MIN: CPT

## 2022-02-17 PROCEDURE — 77030031140 HC SUT VCRL3 J&J -A: Performed by: ORTHOPAEDIC SURGERY

## 2022-02-17 PROCEDURE — 77030010783 HC BOWL MX BN CEM J&J -B: Performed by: ORTHOPAEDIC SURGERY

## 2022-02-17 PROCEDURE — 73560 X-RAY EXAM OF KNEE 1 OR 2: CPT

## 2022-02-17 PROCEDURE — 97116 GAIT TRAINING THERAPY: CPT

## 2022-02-17 PROCEDURE — 77030040393 HC DRSG OPTIFOAM GENT MDII -B: Performed by: ORTHOPAEDIC SURGERY

## 2022-02-17 PROCEDURE — 77030018673: Performed by: ORTHOPAEDIC SURGERY

## 2022-02-17 PROCEDURE — 76210000025 HC REC RM PH II 3 TO 3.5 HR: Performed by: ORTHOPAEDIC SURGERY

## 2022-02-17 PROCEDURE — 77030041690 HC SYS PINNING KN JNJ -D: Performed by: ORTHOPAEDIC SURGERY

## 2022-02-17 PROCEDURE — C1713 ANCHOR/SCREW BN/BN,TIS/BN: HCPCS | Performed by: ORTHOPAEDIC SURGERY

## 2022-02-17 PROCEDURE — 77030040361 HC SLV COMPR DVT MDII -B: Performed by: ORTHOPAEDIC SURGERY

## 2022-02-17 PROCEDURE — 76942 ECHO GUIDE FOR BIOPSY: CPT | Performed by: NURSE ANESTHETIST, CERTIFIED REGISTERED

## 2022-02-17 PROCEDURE — 77030006812 HC BLD SAW RECIP STRY -B: Performed by: ORTHOPAEDIC SURGERY

## 2022-02-17 PROCEDURE — 74011000258 HC RX REV CODE- 258: Performed by: NURSE ANESTHETIST, CERTIFIED REGISTERED

## 2022-02-17 PROCEDURE — 77030007866 HC KT SPN ANES BBMI -B: Performed by: NURSE ANESTHETIST, CERTIFIED REGISTERED

## 2022-02-17 PROCEDURE — 77030000032 HC CUF TRNQT ZIMM -B: Performed by: ORTHOPAEDIC SURGERY

## 2022-02-17 PROCEDURE — 77030013708 HC HNDPC SUC IRR PULS STRY –B: Performed by: ORTHOPAEDIC SURGERY

## 2022-02-17 PROCEDURE — 77030031139 HC SUT VCRL2 J&J -A: Performed by: ORTHOPAEDIC SURGERY

## 2022-02-17 PROCEDURE — 2709999900 HC NON-CHARGEABLE SUPPLY: Performed by: ORTHOPAEDIC SURGERY

## 2022-02-17 PROCEDURE — 74011250637 HC RX REV CODE- 250/637: Performed by: NURSE ANESTHETIST, CERTIFIED REGISTERED

## 2022-02-17 PROCEDURE — 74011250637 HC RX REV CODE- 250/637: Performed by: NURSE PRACTITIONER

## 2022-02-17 PROCEDURE — 74011000272 HC RX REV CODE- 272: Performed by: ORTHOPAEDIC SURGERY

## 2022-02-17 PROCEDURE — 77030011266 HC ELECTRD BLD INSL COVD -A: Performed by: ORTHOPAEDIC SURGERY

## 2022-02-17 PROCEDURE — 74011000250 HC RX REV CODE- 250: Performed by: NURSE ANESTHETIST, CERTIFIED REGISTERED

## 2022-02-17 PROCEDURE — 77030006835 HC BLD SAW SAG STRY -B: Performed by: ORTHOPAEDIC SURGERY

## 2022-02-17 PROCEDURE — 74011000250 HC RX REV CODE- 250: Performed by: ORTHOPAEDIC SURGERY

## 2022-02-17 DEVICE — COMPONENT PAT DIA38MM POLYETH DOME CEM MEDIALIZED ATTUNE: Type: IMPLANTABLE DEVICE | Site: KNEE | Status: FUNCTIONAL

## 2022-02-17 DEVICE — BASEPLATE TIB SZ 5 KNEE ROT PLATFRM CEM SYS ATTUNE: Type: IMPLANTABLE DEVICE | Site: KNEE | Status: FUNCTIONAL

## 2022-02-17 DEVICE — INSERT TIB SZ 6 THK5MM KNEE POST STBL ROT PLATFRM ATTUNE: Type: IMPLANTABLE DEVICE | Site: KNEE | Status: FUNCTIONAL

## 2022-02-17 DEVICE — CEMENT BNE 40GM FULL DOSE PMMA W/ GENT HI VISC RADPQ LNG: Type: IMPLANTABLE DEVICE | Site: KNEE | Status: FUNCTIONAL

## 2022-02-17 DEVICE — KNEE K1 TOT HEMI STD CEM IMPL CAPPED SYNTHES: Type: IMPLANTABLE DEVICE | Site: KNEE | Status: FUNCTIONAL

## 2022-02-17 DEVICE — COMPONENT FEM SZ 6 L KNEE NAR POST STBL CEM ATTUNE: Type: IMPLANTABLE DEVICE | Site: KNEE | Status: FUNCTIONAL

## 2022-02-17 RX ORDER — FACIAL-BODY WIPES
10 EACH TOPICAL DAILY PRN
Status: CANCELLED | OUTPATIENT
Start: 2022-02-17

## 2022-02-17 RX ORDER — MAGNESIUM SULFATE 100 %
4 CRYSTALS MISCELLANEOUS AS NEEDED
Status: CANCELLED | OUTPATIENT
Start: 2022-02-17

## 2022-02-17 RX ORDER — TRAMADOL HYDROCHLORIDE 50 MG/1
50 TABLET ORAL
Qty: 30 TABLET | Refills: 0 | Status: SHIPPED | OUTPATIENT
Start: 2022-02-17 | End: 2022-03-03

## 2022-02-17 RX ORDER — CEFAZOLIN SODIUM IN 0.9 % NACL 2 G/100 ML
2 PLASTIC BAG, INJECTION (ML) INTRAVENOUS EVERY 8 HOURS
Status: CANCELLED | OUTPATIENT
Start: 2022-02-17 | End: 2022-02-17

## 2022-02-17 RX ORDER — CEFAZOLIN SODIUM IN 0.9 % NACL 2 G/100 ML
2 PLASTIC BAG, INJECTION (ML) INTRAVENOUS ONCE
Status: COMPLETED | OUTPATIENT
Start: 2022-02-17 | End: 2022-02-17

## 2022-02-17 RX ORDER — SENNOSIDES 8.6 MG/1
1 TABLET ORAL 2 TIMES DAILY
Status: CANCELLED | OUTPATIENT
Start: 2022-02-17

## 2022-02-17 RX ORDER — SODIUM CHLORIDE 0.9 % (FLUSH) 0.9 %
5-40 SYRINGE (ML) INJECTION AS NEEDED
Status: DISCONTINUED | OUTPATIENT
Start: 2022-02-17 | End: 2022-02-17 | Stop reason: HOSPADM

## 2022-02-17 RX ORDER — SODIUM CHLORIDE 0.9 % (FLUSH) 0.9 %
5-40 SYRINGE (ML) INJECTION EVERY 8 HOURS
Status: CANCELLED | OUTPATIENT
Start: 2022-02-17

## 2022-02-17 RX ORDER — BUPIVACAINE HYDROCHLORIDE AND EPINEPHRINE 2.5; 5 MG/ML; UG/ML
INJECTION, SOLUTION EPIDURAL; INFILTRATION; INTRACAUDAL; PERINEURAL AS NEEDED
Status: DISCONTINUED | OUTPATIENT
Start: 2022-02-17 | End: 2022-02-17 | Stop reason: HOSPADM

## 2022-02-17 RX ORDER — ASPIRIN 325 MG
325 TABLET, DELAYED RELEASE (ENTERIC COATED) ORAL 2 TIMES DAILY
Status: CANCELLED | OUTPATIENT
Start: 2022-02-18

## 2022-02-17 RX ORDER — SODIUM CHLORIDE, SODIUM LACTATE, POTASSIUM CHLORIDE, CALCIUM CHLORIDE 600; 310; 30; 20 MG/100ML; MG/100ML; MG/100ML; MG/100ML
25 INJECTION, SOLUTION INTRAVENOUS CONTINUOUS
Status: DISCONTINUED | OUTPATIENT
Start: 2022-02-17 | End: 2022-02-17 | Stop reason: HOSPADM

## 2022-02-17 RX ORDER — GABAPENTIN 300 MG/1
300 CAPSULE ORAL ONCE
Status: COMPLETED | OUTPATIENT
Start: 2022-02-17 | End: 2022-02-17

## 2022-02-17 RX ORDER — ALBUTEROL SULFATE 0.83 MG/ML
2.5 SOLUTION RESPIRATORY (INHALATION)
Status: DISCONTINUED | OUTPATIENT
Start: 2022-02-17 | End: 2022-02-17 | Stop reason: HOSPADM

## 2022-02-17 RX ORDER — SODIUM CHLORIDE 0.9 % (FLUSH) 0.9 %
5-40 SYRINGE (ML) INJECTION EVERY 8 HOURS
Status: DISCONTINUED | OUTPATIENT
Start: 2022-02-17 | End: 2022-02-17 | Stop reason: HOSPADM

## 2022-02-17 RX ORDER — FLUMAZENIL 0.1 MG/ML
0.2 INJECTION INTRAVENOUS
Status: DISCONTINUED | OUTPATIENT
Start: 2022-02-17 | End: 2022-02-17 | Stop reason: HOSPADM

## 2022-02-17 RX ORDER — CELECOXIB 200 MG/1
400 CAPSULE ORAL
Status: COMPLETED | OUTPATIENT
Start: 2022-02-17 | End: 2022-02-17

## 2022-02-17 RX ORDER — NALOXONE HYDROCHLORIDE 0.4 MG/ML
0.4 INJECTION, SOLUTION INTRAMUSCULAR; INTRAVENOUS; SUBCUTANEOUS AS NEEDED
Status: CANCELLED | OUTPATIENT
Start: 2022-02-17

## 2022-02-17 RX ORDER — ONDANSETRON 2 MG/ML
INJECTION INTRAMUSCULAR; INTRAVENOUS AS NEEDED
Status: DISCONTINUED | OUTPATIENT
Start: 2022-02-17 | End: 2022-02-17 | Stop reason: HOSPADM

## 2022-02-17 RX ORDER — DEXAMETHASONE SODIUM PHOSPHATE 4 MG/ML
INJECTION, SOLUTION INTRA-ARTICULAR; INTRALESIONAL; INTRAMUSCULAR; INTRAVENOUS; SOFT TISSUE
Status: SHIPPED | OUTPATIENT
Start: 2022-02-17 | End: 2022-02-17

## 2022-02-17 RX ORDER — MIDAZOLAM HYDROCHLORIDE 1 MG/ML
INJECTION, SOLUTION INTRAMUSCULAR; INTRAVENOUS
Status: SHIPPED | OUTPATIENT
Start: 2022-02-17 | End: 2022-02-17

## 2022-02-17 RX ORDER — SODIUM CHLORIDE 0.9 % (FLUSH) 0.9 %
5-40 SYRINGE (ML) INJECTION AS NEEDED
Status: CANCELLED | OUTPATIENT
Start: 2022-02-17

## 2022-02-17 RX ORDER — DEXTROSE 50 % IN WATER (D50W) INTRAVENOUS SYRINGE
25-50 AS NEEDED
Status: CANCELLED | OUTPATIENT
Start: 2022-02-17

## 2022-02-17 RX ORDER — DIPHENHYDRAMINE HYDROCHLORIDE 50 MG/ML
12.5 INJECTION, SOLUTION INTRAMUSCULAR; INTRAVENOUS
Status: DISCONTINUED | OUTPATIENT
Start: 2022-02-17 | End: 2022-02-17 | Stop reason: HOSPADM

## 2022-02-17 RX ORDER — OXYCODONE AND ACETAMINOPHEN 10; 325 MG/1; MG/1
1 TABLET ORAL
Status: CANCELLED | OUTPATIENT
Start: 2022-02-17

## 2022-02-17 RX ORDER — FENTANYL CITRATE 50 UG/ML
50 INJECTION, SOLUTION INTRAMUSCULAR; INTRAVENOUS
Status: DISCONTINUED | OUTPATIENT
Start: 2022-02-17 | End: 2022-02-17 | Stop reason: HOSPADM

## 2022-02-17 RX ORDER — FENTANYL CITRATE 50 UG/ML
50 INJECTION, SOLUTION INTRAMUSCULAR; INTRAVENOUS AS NEEDED
Status: DISCONTINUED | OUTPATIENT
Start: 2022-02-17 | End: 2022-02-17 | Stop reason: HOSPADM

## 2022-02-17 RX ORDER — NALOXONE HYDROCHLORIDE 0.4 MG/ML
0.2 INJECTION, SOLUTION INTRAMUSCULAR; INTRAVENOUS; SUBCUTANEOUS AS NEEDED
Status: DISCONTINUED | OUTPATIENT
Start: 2022-02-17 | End: 2022-02-17 | Stop reason: HOSPADM

## 2022-02-17 RX ORDER — OXYCODONE AND ACETAMINOPHEN 5; 325 MG/1; MG/1
2 TABLET ORAL
Status: CANCELLED | OUTPATIENT
Start: 2022-02-17

## 2022-02-17 RX ORDER — BUPIVACAINE HYDROCHLORIDE 5 MG/ML
INJECTION, SOLUTION EPIDURAL; INTRACAUDAL
Status: SHIPPED | OUTPATIENT
Start: 2022-02-17 | End: 2022-02-17

## 2022-02-17 RX ORDER — PROPOFOL 10 MG/ML
VIAL (ML) INTRAVENOUS
Status: DISCONTINUED | OUTPATIENT
Start: 2022-02-17 | End: 2022-02-17 | Stop reason: HOSPADM

## 2022-02-17 RX ORDER — KETOROLAC TROMETHAMINE 30 MG/ML
15 INJECTION, SOLUTION INTRAMUSCULAR; INTRAVENOUS
Status: DISCONTINUED | OUTPATIENT
Start: 2022-02-17 | End: 2022-02-17 | Stop reason: HOSPADM

## 2022-02-17 RX ORDER — ACETAMINOPHEN 500 MG
1000 TABLET ORAL ONCE
Status: COMPLETED | OUTPATIENT
Start: 2022-02-17 | End: 2022-02-17

## 2022-02-17 RX ORDER — ONDANSETRON 2 MG/ML
4 INJECTION INTRAMUSCULAR; INTRAVENOUS
Status: DISCONTINUED | OUTPATIENT
Start: 2022-02-17 | End: 2022-02-17 | Stop reason: HOSPADM

## 2022-02-17 RX ORDER — ONDANSETRON 2 MG/ML
4 INJECTION INTRAMUSCULAR; INTRAVENOUS ONCE
Status: DISCONTINUED | OUTPATIENT
Start: 2022-02-17 | End: 2022-02-17 | Stop reason: HOSPADM

## 2022-02-17 RX ORDER — ACETAMINOPHEN 325 MG/1
650 TABLET ORAL
Status: CANCELLED | OUTPATIENT
Start: 2022-02-17

## 2022-02-17 RX ORDER — DIPHENHYDRAMINE HYDROCHLORIDE 50 MG/ML
12.5 INJECTION, SOLUTION INTRAMUSCULAR; INTRAVENOUS
Status: CANCELLED | OUTPATIENT
Start: 2022-02-17

## 2022-02-17 RX ORDER — HYDROMORPHONE HYDROCHLORIDE 2 MG/1
4 TABLET ORAL
Status: DISCONTINUED | OUTPATIENT
Start: 2022-02-17 | End: 2022-02-17 | Stop reason: HOSPADM

## 2022-02-17 RX ADMIN — FENTANYL CITRATE 50 MCG: 50 INJECTION, SOLUTION INTRAMUSCULAR; INTRAVENOUS at 10:19

## 2022-02-17 RX ADMIN — BUPIVACAINE HYDROCHLORIDE 10 MG: 5 INJECTION, SOLUTION EPIDURAL; INTRACAUDAL at 08:12

## 2022-02-17 RX ADMIN — KETOROLAC TROMETHAMINE 15 MG: 30 INJECTION, SOLUTION INTRAMUSCULAR at 12:37

## 2022-02-17 RX ADMIN — SODIUM CHLORIDE, POTASSIUM CHLORIDE, SODIUM LACTATE AND CALCIUM CHLORIDE 25 ML/HR: 600; 310; 30; 20 INJECTION, SOLUTION INTRAVENOUS at 12:29

## 2022-02-17 RX ADMIN — DEXAMETHASONE SODIUM PHOSPHATE 4 MG: 4 INJECTION, SOLUTION INTRAMUSCULAR; INTRAVENOUS at 07:27

## 2022-02-17 RX ADMIN — ONDANSETRON 4 MG: 2 INJECTION INTRAMUSCULAR; INTRAVENOUS at 12:17

## 2022-02-17 RX ADMIN — CELECOXIB 400 MG: 200 CAPSULE ORAL at 07:13

## 2022-02-17 RX ADMIN — GABAPENTIN 300 MG: 300 CAPSULE ORAL at 07:13

## 2022-02-17 RX ADMIN — Medication 2 G: at 08:13

## 2022-02-17 RX ADMIN — MIDAZOLAM HYDROCHLORIDE 2 MG: 1 INJECTION, SOLUTION INTRAMUSCULAR; INTRAVENOUS at 08:12

## 2022-02-17 RX ADMIN — PROPOFOL 28.09 MCG/KG/MIN: 10 INJECTION, EMULSION INTRAVENOUS at 08:13

## 2022-02-17 RX ADMIN — ONDANSETRON HYDROCHLORIDE 4 MG: 2 INJECTION, SOLUTION INTRAMUSCULAR; INTRAVENOUS at 08:13

## 2022-02-17 RX ADMIN — SODIUM CHLORIDE, POTASSIUM CHLORIDE, SODIUM LACTATE AND CALCIUM CHLORIDE 25 ML/HR: 600; 310; 30; 20 INJECTION, SOLUTION INTRAVENOUS at 06:53

## 2022-02-17 RX ADMIN — BUPIVACAINE HYDROCHLORIDE 25 ML: 5 INJECTION, SOLUTION EPIDURAL; INTRACAUDAL; PERINEURAL at 07:27

## 2022-02-17 RX ADMIN — TRANEXAMIC ACID 1 G: 1 INJECTION, SOLUTION INTRAVENOUS at 08:23

## 2022-02-17 RX ADMIN — HYDROMORPHONE HYDROCHLORIDE 4 MG: 2 TABLET ORAL at 11:10

## 2022-02-17 RX ADMIN — ACETAMINOPHEN 1000 MG: 500 TABLET ORAL at 07:12

## 2022-02-17 RX ADMIN — TRANEXAMIC ACID 1 G: 1 INJECTION, SOLUTION INTRAVENOUS at 08:51

## 2022-02-17 RX ADMIN — MIDAZOLAM HYDROCHLORIDE 4 MG: 2 INJECTION, SOLUTION INTRAMUSCULAR; INTRAVENOUS at 07:27

## 2022-02-17 RX ADMIN — FENTANYL CITRATE 50 MCG: 50 INJECTION, SOLUTION INTRAMUSCULAR; INTRAVENOUS at 10:12

## 2022-02-17 NOTE — PERIOP NOTES
Hans Vencille with PT in to eval pt, teach home exercises, and ambulate pt in hallway. VSS. Pt stable. Pt states her nausea is better and pt's color is back to baseline.

## 2022-02-17 NOTE — OP NOTES
Operative Note    Patient: Stewart Plata MRN: [id number]  Surgery Date: 2/17/2022  [unfilled]          Procedure  Primary Surgeon    LEFT TKA  Marco Henry MD    * Panel 2 does not exist *  * Panel 2 does not exist *    * Panel 3 does not exist *  * Panel 3 does not exist *     Surgeon(s) and Role:     * Marco Henry MD - Primary    Other OR Staff/Assistants:  Circ-1: Emily Valle  Registered Nurse First Assistant: Elda Billyub Tech-2: Kiara Major  Surg Asst-1: Enrrique Guan  Nurse Practitioner: Kaleb Vasquez [address] [place] [address] Assistant Tasks:  Closing    Pre-operative Diagnosis:  Osteoarthritis of left knee, unspecified osteoarthritis type [M17.12]    Post-operative Diagnosis: same as preop diagnosis    Anesthesia Type: Spinal     Findings: djd    Complications: No    EBL: 50 cc    Specimens: None    Implants     Implant    Baseplt Tib Pc Tritnm Sz 5 -- Triathlon - [id number] - Implanted   (Right) Knee    Inventory item: BASEPLT TIB PC TRITNM SZ 5 -- TRIATHLON Model/Cat number: [id number]    : [employer] Lot number: [id number]    As of 3/12/2018     Status: Implanted                  Insert Tib Artc Brng Sz5 9mm -- Triathlon X3 - [id number] - Implanted   (Right) Knee    Inventory item: INSERT TIB ARTC BRNG SZ5 9MM -- TRIATHLON X3 Model/Cat number: [id number]    : [employer] Lot number: [id number]    As of 3/12/2018     Status: Implanted                  Compnt Fem Cr Triathln 5 R Pa -- - [id number] - Implanted   (Right) Knee    Inventory item: COMPNT FEM CR TRIATHLN 5 R PA --  Model/Cat number: [id number]    : [employer] Lot number: [id number]    As of 3/12/2018     Status: Implanted                  Pat Asym Mtl-Bk 10mm Sz A35 -- Triathlon - [id number] - Implanted   (Right) Knee    Inventory item: COMPONENT PAT [id number] WHR20AG SUPERIOR/INFERIOR KNEE Model/Cat number: [id number]    : [address] Lot number: Lyle Del Valle    As of 3/12/2018     Status: Implanted                  Knee Ttl Por/Hyb Triathlon X3 -- K6 Bsv - Sconstruct - Implanted   (Right) Knee    Inventory item: COMPONENT TOT KNEE HYBRID POROUS X3 TRIATHLON Model/Cat number: [id number]    Serial number: CONSTRUCT : "[employer] ORTH[employer]EDICS HOW    As of 3/12/2018     Status: Implanted                  Cement Bne 40gm Full Dose Pmma W/ Gent Hi Visc Radpq Lng - [id number] - Implanted   (Left) Knee    Inventory item: CEMENT BNE 40GM FULL DOSE PMMA W/ GENT HI VISC RADPQ LNG Model/Cat number: [id number]    : [employer] Lot number: [id number]    As of 2/17/2022     Status: Implanted                  Pat Gato Dome Medial 38mm -- Attune - [id number] - Implanted   (Left) Knee    Inventory item: PAT GATO DOME MEDIAL 38MM -- ATTUNE Model/Cat number: [id number]    : [employer] Lot number: [id number]    As of 2/17/2022     Status: Implanted                  Insert Tib Sz 6 Thk5mm Knee Post Stbl Rot Platfrm Attune - [id number] - Implanted   (Left) Knee    Inventory item: INSERT TIB SZ 6 THK5MM KNEE POST STBL ROT PLATFRM ATTUNE Model/Cat number: [id number]    : [employer] Lot number: [id number]    As of 2/17/2022     Status: Implanted                  Baseplate Tib Sz 5 Knee Rot Platfrm Gato Sys Attune - [id number] - Implanted   (Left) Knee    Inventory item: BASEPLATE TIB SZ 5 KNEE ROT PLATFRM GATO SYS ATTUNE Model/Cat number: [id number]    : [employer] Lot number: [id number]    As of 2/17/2022     Status: Implanted                  Component Fem Sz 6 L Knee Lindy Post Stbl Gato Attune - [id number] - Implanted   (Left) Knee    Inventory item: COMPONENT FEM SZ 6 L KNEE LINDY POST STBL GATO ATTUNE Model/Cat number: [id number]    : [employer] Lot number: [id number]    As of 2/17/2022     Status: Implanted OPERATIVE PROCEDURE:  Please note the first assistant role was to help in patient positioning and draping of the extremity in a sterile fashion. Also during the surgery the assistant's responsibilities included but not limited to extremity positioning during critical portions of the surgery. Assisting in using and placement of retractors during surgery. Lower extremity was prepped and draped in a sterile fashion. After adequate anesthesia was given, the patient was placed in a well-padded supine position. Subvastus arthrotomy from the tibial tubercle to the superior pole of the patella was made. Knee was hyperflexed. Intramedullary reaming of distal femur and proximal tibia was performed. 10 mm of distal femur was cut. Anterior-posterior sizing guide was used. Anterior, posterior, chamfer cuts, and box cuts were made next. Proximal tibial cut and preparation performed. Posterior osteophyte meniscal remnants were removed, and also patella was everted. Free-hand cut of the patella was made. Trial components were placed. The patient was found to have excellent range of motion and stability with all trial components. All the trial components removed. Copious irrigation performed. Distal femur, proximal tibia, and patella were impacted in place. Excessive cement was removed. After the cement was hard, Subvastus arthrotomy closed with Vicryl and Prineo stitch. Compressive dressing was applied. The patient was taken to PACU in stable condition. Please note due to the patient's BMI of greater than 30 significant surgical effort was required compared to the standard patient with a BMI lower than 30. Surgical time increased approximately 30% from the normal surgical time due to the patient's high BMI. Because of the high BMI patient's knee would be considered a complex total knee replacement rather than a standard total knee replacement.       Arturo Bowden MD

## 2022-02-17 NOTE — INTERVAL H&P NOTE
Update History & Physical    The Patient's History and Physical was reviewed with the patient. There was no change. The surgical site was confirmed by the patient and me. Patient understands and wants to proceed with the procedure. If applicable, I have discussed with the patient / power of  the rationale for blood component transfusion; its benefits in treating or preventing fatigue, organ damage, or death; and its risk which includes mild transfusion reactions, rare risk of blood borne infection, or more serious but rare reactions. I have discussed the alternatives to transfusion, including the risk and consequences of not receiving transfusion. The [facility] / [facility]  had an opportunity to ask questions and had agreed to proceed with transfusion of blood components. Plan:  The risk, benefits, expected outcome, and alternative to the recommended procedure have been discussed with the patient.       Electronically signed by KHADAR Rain on 2/17/2022 at 7:36 AM

## 2022-02-17 NOTE — ANESTHESIA POSTPROCEDURE EVALUATION
Procedure(s):  LEFT TKA. regional, spinal    Anesthesia Post Evaluation      Multimodal analgesia: multimodal analgesia used between 6 hours prior to anesthesia start to PACU discharge  Patient location during evaluation: bedside  Patient participation: complete - patient cannot participate  Level of consciousness: awake and alert  Pain management: adequate  Airway patency: patent  Anesthetic complications: no  Cardiovascular status: stable  Respiratory status: acceptable  Hydration status: acceptable  Comments: DC when criteria met. Post anesthesia nausea and vomiting:  none  Final Post Anesthesia Temperature Assessment:  Normothermia (36.0-37.5 degrees C)      INITIAL Post-op Vital signs: No vitals data found for the desired time range.

## 2022-02-17 NOTE — PERIOP NOTES
Pt and sister given discharge and ice wrap therapy Instructions with verbalized understanding. No questions voiced. Pt left unit via wheelchair. Pt discharged home with sister.

## 2022-02-17 NOTE — PROGRESS NOTES
Problem: Mobility Impaired (Adult and Pediatric)  Goal: *Acute Goals and Plan of Care (Insert Text)  Description: Pt is MOD (I)  with gait and navigates stairs with MOD (I) . PLOF: Community ambulator who did not use an AD and who was (I) with ADLs. Outcome: Resolved/Met     Problem: Patient Education: Go to Patient Education Activity  Goal: Patient/Family Education  Outcome: Resolved/Met   PHYSICAL THERAPY EVALUATION AND DISCHARGE    Patient: Aj Vicente (58 y.o. female)  Date: 2/17/2022  Primary Diagnosis: Osteoarthritis of left knee, unspecified osteoarthritis type [M17.12]  OA (osteoarthritis) of knee [M17.10]  Procedure(s) (LRB):  LEFT TKA (Left) Day of Surgery   Precautions:  WBAT    ASSESSMENT :  Based on the objective data described below, the patient presents s/p L TKA and she is WBAT. She is able to ambulate with a RW with MOD (I) and she is able to navigate stairs with MOD (I). She only c/o nausea during session. She is educated on a HEP and tolerates well. She can return home with outpatient P.T. Patient does not require further skilled intervention at this level of care. PLAN :  Recommendations and Planned Interventions:   No formal PT needs identified at this time. Discharge Recommendations: Outpatient  Further Equipment Recommendations for Discharge: RW     SUBJECTIVE:   Patient states i'm so nauseous.     OBJECTIVE DATA SUMMARY:     Past Medical History:   Diagnosis Date    Agatston CAC score, <100 07/2019    ca score 51; mild plaque LAD and RCA    Allergic rhinitis Dr. Deisy Polk 2011     Colon adenoma 08/2016    Dr Swanson Novel polyps     Dr Josselyn Obrien 2007, 2011    COVID-19 virus infection 01/2021    FHx: heart disease     Gastritis 07/2017    h pylori+ Dr Josselyn Obrien    GERD (gastroesophageal reflux disease) 2007    [facility]    Hyperlipidemia     calculated risk score 1.9% (2/13); elected to take statins due to Fhx, elev hscrp/ldl-p but intol of 4 diff ones    Hypertension Hyperthyroidism 06/2018    Dr Fely Putnam; tapazole    IFG (impaired fasting glucose) 2017    Obesity (BMI 30.0-34.9)     peak weight 189 lbs, bmi 31.5 from 12/16; IF 6/18 start weight 183 lbs but not doing    Osteoarthritis of both knees     Dr. Chi Deem    Overweight (BMI 25.0-29.9)     saw Dr Mukesh Donnelly; qsymia ineffective 6/14-6/15    Sjogren's disease, probable Dr. Dora Gibson 8/12     Past Surgical History:   Procedure Laterality Date    HX APPENDECTOMY  1980s    HX BREAST BIOPSY Right     HX CHOLECYSTECTOMY  1980s    [address], Dr. Josefina Gallo 2011, 8/16 adenoma    HX KNEE REPLACEMENT Right 2017    Dr Elvis Hamilton TUBAL LIGATION       Barriers to Learning/Limitations: None  Compensate with: N/A  Home Situation:   Home Situation  Home Environment: Private residence  # Steps to Enter: 7  Rails to Enter: Yes  Hand Rails : Bilateral  One/Two Story Residence: Two story  # of Interior Steps: 13  Interior Rails: Both  Living Alone: Yes  Support Systems: Child(opal),Other Family Member(s)  Patient Expects to be Discharged to[de-identified] Home with outpatient services  Current DME Used/Available at Home: None  Critical Behavior:  Neurologic State: Alert  Orientation Level: Oriented X4     Skin Integrity: Incision (comment) (L knee)  Skin Integumentary  Skin Integrity: Incision (comment) (L knee)     Strength:    Strength: Generally decreased, functional (L knee 4/5, SLR 1100, 2.75 hours after spinal)    Tone & Sensation:   Tone: Normal  Sensation: Intact      Range Of Motion:   AROM: Generally decreased, functional (L knee 14-90)      PROM: Generally decreased, functional (L knee 13-99)    Posture:  Posture (WDL): Within defined limits    Functional Mobility:  Bed Mobility:  Rolling: Modified independent  Supine to Sit: Modified independent  Sit to Supine: Modified independent  Scooting: Modified independent  Transfers:  Sit to Stand: Modified independent  Stand to Sit: Modified independent  Balance:   Sitting: Intact  Standing: Intact  Ambulation/Gait Training:  Distance (ft): 510 Feet (ft)  Assistive Device: Walker, rolling  Ambulation - Level of Assistance: Modified independent     Gait Description (WDL): Exceptions to WDL  Gait Abnormalities: Antalgic     Left Side Weight Bearing: As tolerated    Stairs:  Number of Stairs Trained: 5 (noneciprocating)  Stairs - Level of Assistance: Modified independent  Rail Use: Both     Today's TX:   Pt is able to ambulate with MOD (I)  with a RW. She is able to navigate stairs with MOD (I). She is educated on a HEP and states understanding. Pain:  Pain level pre-treatment: 6/10   Pain level post-treatment: 5/10  Pain Location: L knee  Pain Intervention(s): Medication (see MAR); Rest, Ice, Repositioning   Response to intervention: Nurse notified, See doc flow    Activity Tolerance:   Good  Please refer to the flowsheet for vital signs taken during this treatment. After treatment:   []         Patient left in no apparent distress sitting up in chair  [x]         Patient left in no apparent distress in bed  []         Call bell left within reach  [x]         Nursing notified  []         Caregiver present  []         Bed alarm activated  []         SCDs applied    COMMUNICATION/EDUCATION:   [x]         Role of Physical Therapy in the acute care setting. [x]         Fall prevention education was provided and the patient/caregiver indicated understanding. [x]         Patient/family have participated as able in goal setting and plan of care. [x]         Patient/family agree to work toward stated goals and plan of care. []         Patient understands intent and goals of therapy, but is neutral about his/her participation. []         Patient is unable to participate in goal setting/plan of care: ongoing with therapy staff.  []         Other:     Thank you for this referral.  Fany Mcbride, PT, DPT   Time Calculation: 30 mins

## 2022-02-17 NOTE — PERIOP NOTES
Spoke to Liana Tran, NP in office about pt's prescribed pain meds post-op, she has ordered Percocet but has codeine allergy and can't take that. New order received for Dilaudid 4 mg PO Q4hrs PRN pain.

## 2022-02-17 NOTE — ANESTHESIA PROCEDURE NOTES
Spinal Block    Start time: 2/17/2022 8:05 AM  End time: 2/17/2022 8:13 AM  Performed by: Hiren Delarosa CRNA  Authorized by: Hiren Delarosa CRNA     Pre-procedure:   Indications: at surgeon's request and primary anesthetic  Preanesthetic Checklist: patient identified, risks and benefits discussed, anesthesia consent, site marked, patient being monitored and timeout performed    Timeout Time: 08:05 EST          Spinal Block:   Patient Position:  Seated  Prep Region:  Lumbar  Prep: Betadine      Location:  L3-4  Technique:  Single shot    Local Dose (mL):  2    Needle:   Needle Type:  Pencan  Needle Gauge:  25 G  Attempts:  2      Events: CSF confirmed, no blood with aspiration and no paresthesia        Assessment:  Insertion:  Uncomplicated  Patient tolerance:  Patient tolerated the procedure well with no immediate complications

## 2022-02-17 NOTE — DISCHARGE INSTRUCTIONS
TOTAL KNEE REPLACEMENT DISCHARGE INFORMATION    You have undergone a Total Knee Replacement. The following list is to provide you with some expectations over the next week upon your discharge from the hospital.     1. Please begin Aspirin 325mg every 12 hours (twice daily) starting tomorrow as directed until Dr. Janny Chávez instructs you to discontinue it. If you are not sure which blood thinner to take please contact Dr. Shelly Alvarez office next business day for clarification. 2. Please be sure to continue your thigh-high compression stockings on both sides until instructed to discontinue them. 3. Over the course of the next week, you should continue thigh high stockings on the operative leg, DO NOT GET THE INCISION WET until instructed to do so. Please make sure the stockings on the operative leg are pulled up all the way to the thigh to prevent any creases which may result in abrasions or creases in the skin. If the stockings are creating creases resulting in abrasions or blistering on the operative leg please remove the stockings. You may take the stockings off on the nonoperative leg once you arrive home. 4. You may notice some bruising on your thigh and it may extend all the way to the ankles. That is perfectly normal early on.  5. You may experience a clicking noise in your knee and that is normal because of the artificial knee. 6. It is important to remember if you have any surgical procedure including dental procedures which may result in bleeding that an antibiotic 1 hour before the procedure will be required. Please let the provider performing the procedure know that you have artificial joint. If an antibiotic is not given by them please call our office and give us at least 5 business days to get you the appropriate antibiotics if needed. This rule applies indefinitely. 7. If an Ace wrap is placed on your knee you may remove the Ace wrap only 48 hours after your surgery.   We will leave the stockings on.  8. During the course of your  over the next week, should you experience fevers of 101.5 F, a white drainage from the incision, extreme redness around the incision, or the incision begins to have a pungent smell; Please call our office or page Dr. Joaquin Santana whose numbers are provided in your discharge paperwork. To Page Dr. Joaquin Santana please call [phone] and dial [phone]. Have the  page whomever is on call for Orthopedics. These are signs of infection and it should be addressed immediately. 9. Please do not drive until instructed to do so. 10. If you need a refill on pain medication please allow at least 2 business days notice for any refills. Immediate refill request may not be possible. Medication refill requests will not be addressed during non-business hours. Please do not page the on-call provider for pain medication refills after hours. 11. It is very important for you to begin your Outpatient Physical Therapy within a couple days of the day of your discharge and your appointment should have been set up. If your physical therapy has not been set up please call our office the next business day for assistance. Details provided in a separate sheet. 12. Remove ace wrap in 48 hrs after surgery but keep stockings on. 15. Finish all antibiotics, start the antibiotics as soon as you go home if you have prescribed antibiotics. 14.  You should perform your daily home exercises at least 4 times a day 30 minutes each time. Perform foot pumps on both feet at least 10 times every 15 minutes while awake. This helps prevent swelling in the leg and can help prevent blood clots in the leg. On the operative leg if you have significant swelling you can also lay down flat and put 3 pillows under the heel so the heel is above the heart level and then perform foot pumps 4 times a day for 10 minutes to help bring the swelling down. 15.  Do not place anything under your knee while sleeping at night.   Elevate your heel so your  is straight while sleeping at night. 16.  Perform deep breathing exercises 10 times every hour while awake. 17.  If you had a nerve block and you are not having pain the day of the surgery, at nighttime it is okay to take 1 pain medication before going to sleep to help prevent excruciating pain when the nerve block wears off. 18.  You may be given an ice pack machine use that to help prevent swelling. Do not apply heat to the incision area. 19.  While you are awake at least 10 times every 30 minutes move your foot up and down as if you are pumping gas from both feet to help prevent swelling and to promote blood circulation in the calf. 20.  If you develop sudden onset of shortness of breath or severe calf pain please go to closest emergency room. 21. Your pain medicine is a Narcotic and may cause constipation. You may take an over the counter stool softener while taking pain medicine. 22.  You may get text messages regarding questionnaires. Please assist Dr. Max Stephens and filling out those questionnaires. ICE THERAPY WRAP:    · Keep ice therapy wrap on when resting. · DO not wear when moving or walking. · Ice packs are reusable. · Ice Therapy wrap holds two ice packs at a time. Things to watch for:             Increased swelling of the surgical site             Spreading of redness around the incision site             Drainage of pus from the incision site             Developing a fever of 101.5 °F or higher             If any of these symptoms occur you have any questions please contact our office at [phone]. If you need to talk to Dr. Max Stephens on an urgent basis please call the hospital at [phone] for the . Please let the  know you are a surgical patient of Dr. Max Stephens and you wish to get in contact with him. If Dr. Max Stephens or his staff do not call you back within 30 minutes. Please tell the  to try again.     Phone: [phone]  [email or web address]

## 2022-02-17 NOTE — PERIOP NOTES
Pt c/o nausea, pt is pale looking, pt given Zofran IV at this time. Pt sipping on ginger ale and eating saltine crackers. Sister at bedside, will continue to monitor.

## 2022-02-17 NOTE — ANESTHESIA PROCEDURE NOTES
Peripheral Block    Start time: 2/17/2022 7:20 AM  End time: 2/17/2022 7:27 AM  Performed by: Jacinda Wayne CRNA  Authorized by: Jacinda Wayne CRNA       Pre-procedure: Indications: at surgeon's request and post-op pain management    Preanesthetic Checklist: patient identified, risks and benefits discussed, site marked, timeout performed, anesthesia consent given and patient being monitored    Timeout Time: 07:20 EST          Block Type:   Block Type:   Adductor canal block  Laterality:  Left  Monitoring:  Responsive to questions, standard ASA monitoring, continuous pulse ox, oxygen, frequent vital sign checks and heart rate  Injection Technique:  Single shot  Procedures: ultrasound guided    Patient Position: supine  Location:  Mid thigh  Needle Type:  Ultraplex  Needle Gauge:  21 G  Needle Localization:  Ultrasound guidance  Medication Injected:  Midazolam (VERSED) injection, 4 mg  bupivacaine (PF) (MARCAINE) 0.5% injection, 25 mL  dexamethasone (DECADRON) 4 mg/mL injection, 4 mg  Med Admin Time: 2/17/2022 7:27 AM    Assessment:  Number of attempts:  1  Injection Assessment:  Incremental injection every 5 mL, local visualized surrounding nerve on ultrasound, no paresthesia, negative aspiration for blood, no intravascular symptoms and ultrasound image on chart  Patient tolerance:  Patient tolerated the procedure well with no immediate complications

## 2022-02-17 NOTE — TELEPHONE ENCOUNTER
Pt's sister called in and is requesting a refill for her sisters tramadol.      Pharmacy- [facility] [address] [place] [address]

## 2022-02-17 NOTE — PERIOP NOTES
Pt educated on [employer] with verbalized understanding. No questions voiced. Return demonstration by pt, pt got up to 1500 ml on IS.

## 2022-02-18 ENCOUNTER — HOSPITAL ENCOUNTER (OUTPATIENT)
Dept: PHYSICAL THERAPY | Age: 62
Discharge: HOME | End: 2022-02-18
Payer: COMMERCIAL

## 2022-02-18 PROCEDURE — 97140 MANUAL THERAPY 1/> REGIONS: CPT

## 2022-02-18 PROCEDURE — 97161 PT EVAL LOW COMPLEX 20 MIN: CPT

## 2022-02-18 PROCEDURE — 97110 THERAPEUTIC EXERCISES: CPT

## 2022-02-18 NOTE — PROGRESS NOTES
PT DAILY TREATMENT NOTE/KNEE EVAL     Patient Name: Maria De Jesus Shah  LDGQ:[id number]  : 1960  [x]  Patient  Verified  Payor: Ebony Lucio / Plan: Mai Baron / Product Type: HMO /    In time:3:35  Out time:4:15  Total Treatment Time (min): 45  Visit #: 1 of 10    Medicare/BCBS Only   Total Timed Codes (min):    1:1 Treatment Time:          Treatment Area: Pain in left knee [M25.562]      SUBJECTIVE  Pain Level (0-10 scale): 4-5 on pain medication  [x]constant []intermittent []improving []worsening []no change since onset     Any medication changes, allergies to medications, adverse drug reactions, diagnosis change, or new procedure performed?: [x] No    [] Yes (see summary sheet for update)  Subjective functional status/changes:       Subjective: Patient is a 57 y.o. female referred to PT with the above Dx. Patient seen today S/P left TKR on 22. Pain with bending and patient reports that she is not able to do any of her usual exercise. Patient presents to PT with an impaired gait, decreased balance, decreased strength, decreased flexibility, and decreased mobility of the left knee. Patient s/s appear to be consistent w/ diagnosis. Patient demonstrates the potential to make functional gains within a reasonable time frame. Patient will benefit from skilled PT to address impairments and improve functional mobility, strength, gait and balance for an improved quality of life. 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  Stairs, Step to gait pattern    What type of daily activities/hobbies? Golf, walk, palates, [place], TRX training    Limitations to Activity/Recreation/PLOF: Walking, not able to do anything right now         Barriers: [x]pain []financial []time []transportation []other    Motivation: High    FABQ Score: []low []elevate    Cognition: A & O x 3    Other:    Risk For Falls:   [x]No  []low []elevate           OBJECTIVE/EXAMINATION  Demonstrated Balance: Good                 SLS: Right NT  Left  NT     Tandem Stance: NT          Romberg:  EO 30   EC 30  Demonstrated Mobility: (I)  Gait: Slow reciprocal gait (I) with a left antalgic gait  - Transfer sit - supine and sit - stand (I)  - Decr WB left LE with sit - stand  -          AROM PROM Strength Pain? ?    Right Left Right Left Right Left    Hip Flx      5    Hip Ext      5    Hib ABD          Hip ADD          Hip IR          Hip ER          Knee Flx  92    NT    Knee Ext  -17    NT                  Modality rationale: decrease inflammation and decrease pain to improve the patients ability to tolerate post treatment soreness   Min Type Additional Details      [] Estim:  []Unatt       []IFC  []Premod                        []Other:  []w/ice   []w/heat  Position:  Location:      [] Estim: []Att    []TENS instruct  []NMES                    []Other:  []w/US   []w/ice   []w/heat  Position:  Location:       []  Traction: [] Cervical       []Lumbar                       [] Prone          []Supine                       []Intermittent   []Continuous Lbs:  [] before manual  [] after manual      []  Ultrasound: []Continuous   [] Pulsed                           []1MHz   []3MHz W/cm2:  Location:      []  Iontophoresis with dexamethasone         Location: [] Take home patch   [] In clinic    10  [x]  Ice     []  heat  []  Ice massage  []  Laser   []  Anodyne Position:  Location:      []  Laser with stim  []  Other:  Position:  Location:      []  Vasopneumatic Device Pressure:       [] lo [] med [] hi   Temperature: [] lo [] med [] hi    [x] Skin assessment post-treatment:  [x]intact []redness- no adverse reaction    []redness - adverse reaction:      10 min [x]Eval                  []Re-Eval         17 min Therapeutic Exercise:  [x] See flow sheet : Develop and instruct HEP   Rationale: increase ROM, increase strength, and improve coordination to improve the patients ability to Initiate HEP and improve daily function     8 min Manual Therapy:  PROM with Knee Flx/Ext overstretch   Rationale: decrease pain, increase ROM and increase tissue extensibility to tolerate increased activity                                                                   With   [x] TE   [] TA   [] neuro   [] other: Patient Education: [x] Review HEP    [] Progressed/Changed HEP based on:   [] positioning   [] body mechanics   [] transfers   [] heat/ice application    [] other:       Other Objective/Functional Measures:     Access Code: [email or web address]  URL: [email or web address]  Date: 02/18/2022  Prepared by: Franc Esparza  Long Sitting Quad Set - 2 x daily - 7 x weekly - 1 sets - 10 reps - 5 hold  Supine Knee Extension Strengthening - 2 x daily - 7 x weekly - 3 sets - 10 reps  Supine Straight Leg Raises - 2 x daily - 7 x weekly - 3 sets - 10 reps  Seated March - 2 x daily - 7 x weekly - 3 sets - 10 reps  Sit to Stand - 2 x daily - 7 x weekly - 3 sets - 10 reps  Mini Squat with Counter Support - 2 x daily - 7 x weekly - 3 sets - 10 reps  Standing Knee Flexion - 2 x daily - 7 x weekly - 3 sets - 10 reps  Supine Heel Slide - 2 x daily - 7 x weekly - 3 sets - 10 reps - 5 hold  Seated Heel Slide - 2 x daily - 7 x weekly - 3 sets - 10 reps - 5 hold     Pain Level (0-10 scale) post treatment: 6     ASSESSMENT/Changes in Function:    - Knee Flx at 109* after MT      [x]  See Plan of Care  []  See progress note/recertification  []  See Discharge Summary         Progress towards goals / Updated goals:  Short Term Goals: To be accomplished in 5 treatments:  1. Pt will be compliant and independent with HEP in order to facilitate PT sessions and aid with self management   Eval Status:  Initiated   Current Status:  2. Pt to tolerate 30 min or more of TE and/or Interventions w/o increased s/s   Eval Status:  Initiated   Current Status:    Long Term Goals: To be accomplished in 10 treatments:  1. Pt will report 50% improvement or better with dysfunction to show a significant increase in ability to tolerate ADL   Eval Status:  Initiated   Current Status:  2. Pt will have decreased pain at 2/10 or better to allow her to tolerate increased activity levels for progress to return to performing her usual activity   Eval Status:  Pain 5/10   Current Status:  3. Pt will demonstrate PRE 3x10 reps with little to no difficulty for carryover to return working out 3x/wk with little difficulty     Eval Status:  Not able to do usual workout routine 3x/wk   Current Status:  4. Pt will ambulate 1/2 mile on TM with a good pace and little to no difficulty for carryover to walking 1 mile or more for exercise with little difficulty      Eval Status:  Not able to walk for exercise   Current Status:  5.   Pt will improve FOTO score to 55 in 14 visits to show significant improvement in function for progress to performing usual activity   Eval Status: FOTO 33    Current Status:      PLAN  [x]  Upgrade activities as tolerated     [x]  Continue plan of care  []  Update interventions per flow sheet       []  Discharge due to:_  []  Other:_          Pérez Ly, PT 2/18/2022  3:32 PM

## 2022-02-21 ENCOUNTER — VIRTUAL VISIT (OUTPATIENT)
Dept: ORTHOPEDIC SURGERY | Age: 62
End: 2022-02-21
Payer: COMMERCIAL

## 2022-02-21 DIAGNOSIS — Z96.652 STATUS POST LEFT KNEE REPLACEMENT: Primary | ICD-10-CM

## 2022-02-21 PROCEDURE — 99024 POSTOP FOLLOW-UP VISIT: CPT | Performed by: NURSE PRACTITIONER

## 2022-02-21 RX ORDER — PROMETHAZINE HYDROCHLORIDE 25 MG/1
25 TABLET ORAL
Qty: 30 TABLET | Refills: 1 | Status: SHIPPED | OUTPATIENT
Start: 2022-02-21 | End: 2022-07-13

## 2022-02-21 NOTE — PROGRESS NOTES
Subjective:      Patient presents for postop care following left TKA. Surgery was on 2/17/2022. Ambulating independantly. Pain is controlled with current analgesics. Medication(s) being used: Percocet. States frequent nausea and vomiting since surgery. Sister states that she is going to take her to an ER for \"fluids. \"    Objective: There were no vitals taken for this visit. General:  alert, cooperative, no distress   ROM: Able to independently raise leg per sister   Incision:   healing well, no drainage, no erythema, incision well approximated, mild swelling per sister     Assessment:     Postoperative course complicated by severe N&V not responding to Zofran    Plan:     1. Continue PT. 2. Wound care/showering discussed. 3. Continue DVT prophylaxis as directed. 4. Pt is to increase activities as tolerated. 5. Phenergan ordered for nausea and vomiting. 6. Follow up in 2 weeks for repeat evaluation. Yasir Phillips, who was evaluated through a synchronous (real-time) audio only encounter, and/or her healthcare decision maker, is aware that it is a billable service, with coverage as determined by her insurance carrier. She provided verbal consent to proceed: Yes, and patient identification was verified. It was conducted pursuant to the emergency declaration under the [facility], [address] [place] [address] authority and the [employer] Act. A caregiver was present when appropriate. Ability to conduct physical exam was limited. I was in the office. The patient was at home.

## 2022-02-23 ENCOUNTER — APPOINTMENT (OUTPATIENT)
Dept: PHYSICAL THERAPY | Age: 62
End: 2022-02-23
Payer: COMMERCIAL

## 2022-02-25 ENCOUNTER — APPOINTMENT (OUTPATIENT)
Dept: PHYSICAL THERAPY | Age: 62
End: 2022-02-25
Payer: COMMERCIAL

## 2022-02-25 ENCOUNTER — PATIENT OUTREACH (OUTPATIENT)
Dept: CASE MANAGEMENT | Age: 62
End: 2022-02-25

## 2022-02-25 NOTE — PROGRESS NOTES
Care Transitions Initial Call     Call within 2 business days of discharge: Yes     Patient: Judah Rodriguez Patient : 1960 MRN: [id number]    Last Discharge [address]       Complaint Diagnosis Description Type Department Provider    22   Admission (Discharged) MATT Palmer          Was this an external facility discharge? Yes, [facility]    Discharge Date:  22    Care Transitions Nurse ( CTN) attempted to contact patient via telephone call. There was no response. A voicemail message was left requesting a non-emergency return telephone call. CTN  contact information provided. Request for PCP Transition of Care appointment submitted.

## 2022-02-28 ENCOUNTER — PATIENT OUTREACH (OUTPATIENT)
Dept: CASE MANAGEMENT | Age: 62
End: 2022-02-28

## 2022-02-28 ENCOUNTER — HOSPITAL ENCOUNTER (OUTPATIENT)
Dept: PHYSICAL THERAPY | Age: 62
Discharge: HOME | End: 2022-02-28
Payer: COMMERCIAL

## 2022-02-28 PROCEDURE — 97110 THERAPEUTIC EXERCISES: CPT

## 2022-02-28 PROCEDURE — 97140 MANUAL THERAPY 1/> REGIONS: CPT

## 2022-02-28 NOTE — PROGRESS NOTES
PT DAILY TREATMENT NOTE     Patient Name: Aj Vicente  Date:2022  : 1960  [x]  Patient  Verified  Payor: Albertha Osler / Plan: Ila Mujica / Product Type: HMO /    In time:205  Out time:255  Total Treatment Time (min): 50  Visit #: 2 of 10    Medicare/BCBS Only   Total Timed Codes (min):   1:1 Treatment Time:         Treatment Area: Pain in left knee [M25.562]    SUBJECTIVE  Pain Level (0-10 scale): 6  Any medication changes, allergies to medications, adverse drug reactions, diagnosis change, or new procedure performed?: [x] No    [] Yes (see summary sheet for update)  Subjective functional status/changes:   [] No changes reported  Pt reported hospitalized last week for acute pancreatitis. She was admitted for 4 days. Patient amb in without AD but painful swelling in left knee.        OBJECTIVE    Modality rationale: decrease inflammation and decrease pain to improve the patients ability to perform ADLs   Min Type Additional Details    [] Estim:  []Unatt       []IFC  []Premod                        []Other:  []w/ice   []w/heat  Position:  Location:    [] Estim: []Att    []TENS instruct  []NMES                    []Other:  []w/US   []w/ice   []w/heat  Position:  Location:    []  Traction: [] Cervical       []Lumbar                       [] Prone          []Supine                       []Intermittent   []Continuous Lbs:  [] before manual  [] after manual    []  Ultrasound: []Continuous   [] Pulsed                           []1MHz   []3MHz W/cm2:  Location:    []  Iontophoresis with dexamethasone         Location: [] Take home patch   [] In clinic   10 [x]  Ice     []  heat  []  Ice massage  []  Laser   []  Anodyne Position:supine  Location:left knee    []  Laser with stim  []  Other:  Position:  Location:    []  Vasopneumatic Device    []  Right     []  Left  Pre-treatment girth:  Post-treatment girth:  Measured at (location):  Pressure:       [] lo [] med [] hi   Temperature: [] lo [] med [] hi   [] Skin assessment post-treatment:  []intact []redness- no adverse reaction    []redness - adverse reaction:         30 min Therapeutic Exercise:  [] See flow sheet :   Rationale: increase ROM and increase strength to improve the patients ability to perform ADLs        10 min Manual Therapy:  Gentle PROM, patellar mobs, STM and edema massage calf, lateral quad and medial HS and pes anserine on left. The manual therapy interventions were performed at a separate and distinct time from the therapeutic activities interventions. Rationale: decrease pain, increase ROM, increase tissue extensibility and decrease edema  to perform ADLs              With   [] TE   [] TA   [] neuro   [] other: Patient Education: [x] Review HEP    [] Progressed/Changed HEP based on:   [] positioning   [] body mechanics   [] transfers   [] heat/ice application    [] other:      Other Objective/Functional Measures: initiated treatment within limitations     AROM post manual  degs left knee    Pain Level (0-10 scale) post treatment: 4    ASSESSMENT/Changes in Function: patient tolerated limited therex and gentle manual treatment well today with decreased pain and improved gait post.  Patient instructed to contact MD for note to allow continuation of PT post hospitalization before NV. Patient will continue to benefit from skilled PT services to modify and progress therapeutic interventions, address functional mobility deficits, address ROM deficits, address strength deficits, analyze and address soft tissue restrictions and analyze and cue movement patterns to attain remaining goals. [x]  See Plan of Care  []  See progress note/recertification  []  See Discharge Summary         Progress towards goals / Updated goals:  Short Term Goals: To be accomplished in 5 treatments:  1.   Pt will be compliant and independent with HEP in order to facilitate PT sessions and aid with self management             Eval Status: Initiated             Current Status:  2. Pt to tolerate 30 min or more of TE and/or Interventions w/o increased s/s             Eval Status:  Initiated             Current Status:     Long Term Goals: To be accomplished in 10 treatments:  1. Pt will report 50% improvement or better with dysfunction to show a significant increase in ability to tolerate ADL             Eval Status:  Initiated             Current Status:  2. Pt will have decreased pain at 2/10 or better to allow her to tolerate increased activity levels for progress to return to performing her usual activity             Eval Status:  Pain 5/10             Current Status:  3. Pt will demonstrate PRE 3x10 reps with little to no difficulty for carryover to return working out 3x/wk with little difficulty               Eval Status:  Not able to do usual workout routine 3x/wk             Current Status:  4. Pt will ambulate 1/2 mile on TM with a good pace and little to no difficulty for carryover to walking 1 mile or more for exercise with little difficulty                Eval Status:  Not able to walk for exercise             Current Status:  5.   Pt will improve FOTO score to 55 in 14 visits to show significant improvement in function for progress to performing usual activity             Eval Status: FOTO 33              Current Status:     PLAN  []  Upgrade activities as tolerated     [x]  Continue plan of care  []  Update interventions per flow sheet       []  Discharge due to:_  []  Other:_      Josi Cox PTA 2/28/2022  2:44 PM    Future Appointments   Date Time Provider Asael Solano   3/2/2022  2:45 PM Malgorzata Winchester PTA [facility]   3/3/2022 10:40 AM Tono Beltran MD [facility] BS AMB   3/4/2022  2:45 PM Birdie Harada, PT [facility]   3/7/2022  3:30 PM Malgorzata Winchester PTA [facility]   3/9/2022  2:45 PM Malgorzata Winchester PTA [facility]   3/11/2022  2:45 PM Birdie Harada, PT [facility]   3/14/2022  3:30 PM Malgorzata Winchester, PTA [facility]   7/12/2022  8:05 AM [facility] LAB VISIT [facility] BS AMB   7/19/2022  9:40 AM Carola Prabhakar MD [facility] BS AMB

## 2022-02-28 NOTE — PROGRESS NOTES
Care Transitions Initial Call    Call within 2 business days of discharge: Yes     Patient: Maria De Jesus Shah Patient : 1960 MRN: [id number]    Last Discharge [address]       Complaint Diagnosis Description Type Department Provider    22   Admission (Discharged) MATT Gandhi          Was this an external facility discharge? Yes, 22 Discharge Facility: [place] [address] Transitions Nurse ( CTN) attempted to contact patient via telephone call. There was no response. A voicemail message was left for patient indicting this was not an emergency phone call and CTN contact information provided. Per chart review PCP Transition of Care appointment is scheduled for 3/3/22.

## 2022-03-02 ENCOUNTER — APPOINTMENT (OUTPATIENT)
Dept: PHYSICAL THERAPY | Age: 62
End: 2022-03-02
Payer: COMMERCIAL

## 2022-03-03 ENCOUNTER — OFFICE VISIT (OUTPATIENT)
Dept: INTERNAL MEDICINE CLINIC | Age: 62
End: 2022-03-03
Payer: COMMERCIAL

## 2022-03-03 VITALS
RESPIRATION RATE: 16 BRPM | WEIGHT: 192 LBS | TEMPERATURE: 97 F | OXYGEN SATURATION: 93 % | SYSTOLIC BLOOD PRESSURE: 114 MMHG | DIASTOLIC BLOOD PRESSURE: 67 MMHG | HEIGHT: 65 IN | HEART RATE: 77 BPM | BODY MASS INDEX: 31.99 KG/M2

## 2022-03-03 DIAGNOSIS — I10 ESSENTIAL HYPERTENSION: ICD-10-CM

## 2022-03-03 DIAGNOSIS — I10 ESSENTIAL HYPERTENSION: Primary | ICD-10-CM

## 2022-03-03 DIAGNOSIS — K85.90 ACUTE PANCREATITIS, UNSPECIFIED COMPLICATION STATUS, UNSPECIFIED PANCREATITIS TYPE: ICD-10-CM

## 2022-03-03 PROBLEM — E05.00 TOXIC DIFFUSE GOITER WITHOUT CRISIS: Status: ACTIVE | Noted: 2018-10-30

## 2022-03-03 PROCEDURE — 99215 OFFICE O/P EST HI 40 MIN: CPT | Performed by: INTERNAL MEDICINE

## 2022-03-03 RX ORDER — OXYCODONE HYDROCHLORIDE 5 MG/1
5-10 TABLET ORAL
COMMUNITY
Start: 2022-02-24 | End: 2022-07-13 | Stop reason: ALTCHOICE

## 2022-03-03 RX ORDER — ASPIRIN 325 MG
325 TABLET ORAL 2 TIMES DAILY
COMMUNITY
End: 2022-07-13

## 2022-03-03 NOTE — PROGRESS NOTES
Giana Soto presents with No chief complaint on file. 1. \"Have you been to the ER, urgent care clinic since your last visit? Hospitalized since your last visit? \" [facility] 2-21-22 to 2-24-22 for pancreatitis, [facility] for Left TKA    2. \"Have you seen or consulted any other health care providers outside of the [address] [place] [address] since your last visit? \" [facility] for ED/Hosp    3. For patients aged 39-70: Has the patient had a colonoscopy? Yes - Care Gap present. Most recent result on file     If the patient is female:    4. For patients aged 41-77: Has the patient had a mammogram within the past 2 years? Yes - no Care Gap present    5. For patients aged 21-65: Has the patient had a pap smear?  Yes - no Care Gap present

## 2022-03-03 NOTE — PROGRESS NOTES
Patient being seen today for transitional care management    Patient was admitted to [facility] from 2/21-24/22             I thoroughly reviewed the discharge summary, notes, consults, labs and imaging studies in the electronic record. Pertinent details are summarized below and the record has been updated to reflect recent events    Here to follow up after her admission to [facility]. She had undergone left TKA by Dr Kelly Aguiar. She then developed progressive epig pain associated with nausea and vomiting. She had elevated LFTS and lipase and admitted. CT showed acute pancreatitis, probable liver cysts/hemangiomas, 1.4cm L adrenal mass. She was npo on ivf and eventually sent home. She is s/p choley, ca/trig ok, averages 1 drink/night at most.  No prior pancreatic issues. She is still having anorexia, had upset stomach with eating rice and bland chicken last night.   Moving her bowels, no bleeding, fever, weight is down some    Past Medical History:   Diagnosis Date    Agatston CAC score, <100 07/2019    ca score 51; mild plaque LAD and RCA    Allergic rhinitis Dr. Stephanie Dinero 2011     Colon adenoma 08/2016    Dr Arturo Quiroz 2007, 2011    COVID-19 virus infection 01/2021    FHx: heart disease     Gastritis 07/2017    h pylori+ Dr Harry Horne GERD (gastroesophageal reflux disease) 2007    Critical access hospital    Hyperlipidemia     calculated risk score 1.9% (2/13); elected to take statins due to Fhx, elev hscrp/ldl-p but intol of 4 diff ones    Hypertension     Hyperthyroidism 06/2018    Dr Jay Jay Sesay; tapazole    IFG (impaired fasting glucose) 2017    Obesity (BMI 30.0-34.9)     peak weight 189 lbs, bmi 31.5 from 12/16; IF 6/18 start weight 183 lbs but not doing    Osteoarthritis of both knees     Dr. Neha Jerome Overweight (BMI 25.0-29.9)     saw Dr Sarah Gruber; qsymia ineffective 6/14-6/15    Pancreatitis 02/2022    postop    Sjogren's disease, probable Dr. Jeramie Hartley 8/12     Past Surgical History:   Procedure Laterality Date    HX APPENDECTOMY      HX BREAST BIOPSY Right     HX CHOLECYSTECTOMY      HX COLONOSCOPY      [facility] 2007, Dr. Roseann Plata ,  adenoma    HX KNEE REPLACEMENT Bilateral     Dr Jose Rivero (); Dr Piero Denton ()    HX TUBAL LIGATION       Social History     Socioeconomic History    Marital status: SINGLE     Spouse name: Not on file    Number of children: 1    Years of education: Not on file    Highest education level: Not on file   Occupational History    Occupation:  [employer]   Tobacco Use    Smoking status: Former Smoker     Packs/day: 0.50     Years: 15.00     Pack years: 7.50     Quit date: 2000     Years since quittin.1    Smokeless tobacco: Never Used   Vaping Use    Vaping Use: Never used   Substance and Sexual Activity    Alcohol use: Yes     Alcohol/week: 1.0 standard drink     Types: 1 Glasses of wine per week    Drug use: No    Sexual activity: Not Currently   Other Topics Concern    Not on file   Social History Narrative    Not on file     Social Determinants of Health     Financial Resource Strain:     Difficulty of Paying Living Expenses: Not on file   Food Insecurity:     Worried About Running Out of Food in the Last Year: Not on file    Ramiro of Food in the Last Year: Not on file   Transportation Needs:     Lack of Transportation (Medical): Not on file    Lack of Transportation (Non-Medical):  Not on file   Physical Activity:     Days of Exercise per Week: Not on file    Minutes of Exercise per Session: Not on file   Stress:     Feeling of Stress : Not on file   Social Connections:     Frequency of Communication with Friends and Family: Not on file    Frequency of Social Gatherings with Friends and Family: Not on file    Attends [employer] Services: Not on file    Active Member of Clubs or Organizations: Not on file    Attends Club or Organization Meetings: Not on file    Marital Status: Not on file   Intimate Partner Violence:     Fear of Current or Ex-Partner: Not on file    Emotionally Abused: Not on file    Physically Abused: Not on file    Sexually Abused: Not on file   Housing Stability:     Unable to Pay for Housing in the Last Year: Not on file    Number of [place] in the Last Year: Not on file    Unstable Housing in the Last Year: Not on file     Current Outpatient Medications   Medication Sig    oxyCODONE IR (ROXICODONE) 5 mg immediate release tablet Take 5-10 mg by mouth every four (4) hours as needed.  pantoprazole (PROTONIX) 40 mg tablet take 1 tablet by mouth once daily    amLODIPine (NORVASC) 10 mg tablet take 1 tablet by mouth once daily    pitavastatin calcium (Livalo) 1 mg tab tablet take 1 tablet by mouth once daily    cevimeline (EVOXAC) 30 mg capsule take 1 capsule by mouth three times a day    aspirin (ASPIRIN) 325 mg tablet Take 325 mg by mouth two (2) times a day.  promethazine (PHENERGAN) 25 mg tablet Take 1 Tablet by mouth every eight (8) hours as needed for Nausea. (Patient not taking: Reported on 3/3/2022)    traMADoL (ULTRAM) 50 mg tablet Take 1 Tablet by mouth every six (6) hours as needed for Pain for up to 14 days. Max Daily Amount: 200 mg. (Patient not taking: Reported on 3/3/2022)    ondansetron (ZOFRAN ODT) 4 mg disintegrating tablet Take 1 Tablet by mouth every eight (8) hours as needed for Nausea, Vomiting or Nausea or Vomiting for up to 20 doses. (Patient not taking: Reported on 3/3/2022)    celecoxib (CELEBREX) 200 mg capsule take 1 capsule once daily if needed for pain (Patient not taking: Reported on 3/3/2022)    zolpidem (AMBIEN) 10 mg tablet Take 1 Tablet by mouth nightly as needed for Sleep. Max Daily Amount: 10 mg. (Patient not taking: Reported on 3/3/2022)    DULoxetine (CYMBALTA) 30 mg capsule Take 1 Capsule by mouth daily. (Patient not taking: Reported on 3/3/2022)     No current facility-administered medications for this visit.      Allergies Allergen Reactions    Benazepril Cough    Chantix [Varenicline] Nausea and Vomiting    Codeine Nausea and Vomiting and Other (comments)     Abdominal Pain    Crestor [Rosuvastatin] Myalgia    Lipitor [Atorvastatin] Myalgia    Pravastatin Myalgia    Prevacid [Lansoprazole] Other (comments)     Constipation    Zocor [Simvastatin] Myalgia     Visit Vitals  /67   Pulse 77   Temp 97 °F (36.1 °C) (Temporal)   Resp 16   Ht 5' 5\" (1.651 m)   Wt 192 lb (87.1 kg)   SpO2 93%   BMI 31.95 kg/m²   A&O x3  Affect is appropriate. Mood stable  No apparent distress  Anicteric, no JVD, adenopathy or thyromegaly. No carotid bruits or radiated murmur  Lungs clear to auscultation, no wheezes or rales  Heart showed regular rate and rhythm. No murmur, rubs, gallops  Abdomen soft nontender, no hepatosplenomegaly or masses. Extremities without edema. Pulses 1-2+ symmetrically    Impression CT abd/pelv [facility] 2/21/22  Performed by RADIOLOGY    Findings of acute edematous pancreatitis, noting peripancreatic stranding/fluid and phlegmonous change with no fully formed drainable collection at this time. Indeterminate hepatic lesions of relatively low suspicion, likely a combination of hemangiomas and cysts. Recommend comparison with any prior imaging versus precautionary further evaluation with hepatic protocol MR imaging to be performed on an outpatient basis. This examination could also potentially process an indeterminate 1.4 cm left adrenal mass. Assessment and plan:  1. S/P Left TKA. Follow up Dr Home Garcia as scheduled  2. Pancreatitis. Idiopathic? She will stop all etoh, will send to Dr López Sánchez for opinion. Recheck labs today  3. Adrenal mass. She wants Dr Yumiko Causey to pursue, has appt in April. Will forward note          Above conditions discussed at length and patient vocalized understanding.   All questions answered to patient satisfaction

## 2022-03-04 ENCOUNTER — APPOINTMENT (OUTPATIENT)
Dept: PHYSICAL THERAPY | Age: 62
End: 2022-03-04
Payer: COMMERCIAL

## 2022-03-04 ENCOUNTER — DOCUMENTATION ONLY (OUTPATIENT)
Dept: INTERNAL MEDICINE CLINIC | Age: 62
End: 2022-03-04

## 2022-03-04 NOTE — PROGRESS NOTES
Faxed yesterday's note to Dr Brennon Ordoñez at [phone] and to Dr Jaymie Jones at [phone] per Dr Ester Dawn

## 2022-03-05 LAB
ALBUMIN SERPL-MCNC: 3.7 G/DL (ref 3.8–4.8)
ALBUMIN/GLOB SERPL: 1.5 {RATIO} (ref 1.2–2.2)
ALP SERPL-CCNC: 421 IU/L (ref 44–121)
ALT SERPL-CCNC: 124 IU/L (ref 0–32)
AMYLASE SERPL-CCNC: 228 U/L (ref 31–110)
AST SERPL-CCNC: 157 IU/L (ref 0–40)
BILIRUB SERPL-MCNC: 0.4 MG/DL (ref 0–1.2)
BUN SERPL-MCNC: 12 MG/DL (ref 8–27)
BUN/CREAT SERPL: 16 (ref 12–28)
CALCIUM SERPL-MCNC: 8.9 MG/DL (ref 8.7–10.3)
CHLORIDE SERPL-SCNC: 98 MMOL/L (ref 96–106)
CO2 SERPL-SCNC: 23 MMOL/L (ref 20–29)
CREAT SERPL-MCNC: 0.77 MG/DL (ref 0.57–1)
EGFR: 87 ML/MIN/1.73
ERYTHROCYTE [DISTWIDTH] IN BLOOD BY AUTOMATED COUNT: 12.8 % (ref 11.7–15.4)
GLOBULIN SER CALC-MCNC: 2.4 G/DL (ref 1.5–4.5)
GLUCOSE SERPL-MCNC: 92 MG/DL (ref 65–99)
HCT VFR BLD AUTO: 35.7 % (ref 34–46.6)
HGB BLD-MCNC: 11.5 G/DL (ref 11.1–15.9)
INTERPRETATION: NORMAL
LIPASE SERPL-CCNC: 298 U/L (ref 14–72)
MCH RBC QN AUTO: 29.9 PG (ref 26.6–33)
MCHC RBC AUTO-ENTMCNC: 32.2 G/DL (ref 31.5–35.7)
MCV RBC AUTO: 93 FL (ref 79–97)
PLATELET # BLD AUTO: 407 X10E3/UL (ref 150–450)
POTASSIUM SERPL-SCNC: 4.4 MMOL/L (ref 3.5–5.2)
PROT SERPL-MCNC: 6.1 G/DL (ref 6–8.5)
RBC # BLD AUTO: 3.85 X10E6/UL (ref 3.77–5.28)
SODIUM SERPL-SCNC: 139 MMOL/L (ref 134–144)
WBC # BLD AUTO: 7.4 X10E3/UL (ref 3.4–10.8)

## 2022-03-06 ENCOUNTER — TELEPHONE (OUTPATIENT)
Dept: INTERNAL MEDICINE CLINIC | Age: 62
End: 2022-03-06

## 2022-03-06 DIAGNOSIS — G47.00 INSOMNIA, UNSPECIFIED TYPE: ICD-10-CM

## 2022-03-06 NOTE — LETTER
NOTIFICATION RETURN TO WORK / SCHOOL    3/7/2022 2:18 PM    Ms. Yon Ewing  [address] [address]      To Whom It May Concern:    Yon Ewing is currently under the care of Emily Murry. From 3-3-22 to 3-17-22    She will return to work/school on: 3-18-22    If there are questions or concerns please have the patient contact our office.         Sincerely,      Bassam Ballesteros MD

## 2022-03-07 ENCOUNTER — TELEPHONE (OUTPATIENT)
Dept: INTERNAL MEDICINE CLINIC | Age: 62
End: 2022-03-07

## 2022-03-07 ENCOUNTER — APPOINTMENT (OUTPATIENT)
Dept: PHYSICAL THERAPY | Age: 62
End: 2022-03-07
Payer: COMMERCIAL

## 2022-03-07 RX ORDER — ZOLPIDEM TARTRATE 10 MG/1
10 TABLET ORAL
Qty: 30 TABLET | Refills: 0 | Status: SHIPPED | OUTPATIENT
Start: 2022-03-07 | End: 2022-07-19 | Stop reason: SDUPTHER

## 2022-03-07 NOTE — TELEPHONE ENCOUNTER
Ok to give work note.  Can call back PCP next week to get approval for PT since she is in pain currently from pancreatitis

## 2022-03-07 NOTE — TELEPHONE ENCOUNTER
Scott Kelley is calling from [address] in [place] stating patient was hospitalized 2/21 - 2/24 and they are needing a medical release from his PCP stating she has permission to continue PT.     Fax: [phone]

## 2022-03-07 NOTE — LETTER
3/7/2022/8      Ms. Yasir Phillips  [address]  [address] [facility] [address]      Patient is cleared to return to physical therapy for her  knee as long as the back and abdominal pain she has been expering does not interfere.      Please call me if you have any questions: [phone]        Sincerely,      Juan Jose Arellano MD       (Dr. Mary Gabriel)

## 2022-03-07 NOTE — TELEPHONE ENCOUNTER
pls call    Results for orders placed or performed in visit on 27/08/91   METABOLIC PANEL, COMPREHENSIVE   Result Value Ref Range    Glucose 92 65 - 99 mg/dL    BUN 12 8 - 27 mg/dL    Creatinine 0.77 0.57 - 1.00 mg/dL    eGFR 87 >59 mL/min/1.73    BUN/Creatinine ratio 16 12 - 28    Sodium 139 134 - 144 mmol/L    Potassium 4.4 3.5 - 5.2 mmol/L    Chloride 98 96 - 106 mmol/L    CO2 23 20 - 29 mmol/L    Calcium 8.9 8.7 - 10.3 mg/dL    Protein, total 6.1 6.0 - 8.5 g/dL    Albumin 3.7 (L) 3.8 - 4.8 g/dL    GLOBULIN, TOTAL 2.4 1.5 - 4.5 g/dL    A-G Ratio 1.5 1.2 - 2.2    Bilirubin, total 0.4 0.0 - 1.2 mg/dL    Alk.  phosphatase 421 (H) 44 - 121 IU/L    AST (SGOT) 157 (H) 0 - 40 IU/L    ALT (SGPT) 124 (H) 0 - 32 IU/L   CBC W/O DIFF   Result Value Ref Range    WBC 7.4 3.4 - 10.8 x10E3/uL    RBC 3.85 3.77 - 5.28 x10E6/uL    HGB 11.5 11.1 - 15.9 g/dL    HCT 35.7 34.0 - 46.6 %    MCV 93 79 - 97 fL    MCH 29.9 26.6 - 33.0 pg    MCHC 32.2 31.5 - 35.7 g/dL    RDW 12.8 11.7 - 15.4 %    PLATELET 928 394 - 222 x10E3/uL   CKD REPORT   Result Value Ref Range    Interpretation Note    LIPASE   Result Value Ref Range    Lipase 298 (H) 14 - 72 U/L   AMYLASE   Result Value Ref Range    Amylase 228 (H) 31 - 110 U/L     Lfts, lipase, amylase still elevated  If still having pain, would return to ER  If not, pls expedite appt with [facility]

## 2022-03-07 NOTE — TELEPHONE ENCOUNTER
I spoke with the patient and she is currently being added to Dr. Cholo Warner schedule eor an acute pancreatitis episode with ongoing pain. Patient is asking eor a work note to cover her erom 3/3 -3/17/2022, rx eor Ambien, which was discussed with Dr. Rajiv Blum at her last visit, and to restart PT with [facility] post op erom her recent surgery  on her LTKR. Patient only stopped PT because oe her hospitalization with pancreatitis and needs to be approved by her PCP to return.  Thank you

## 2022-03-07 NOTE — LETTER
3/7/2022    Ms. Samreen Luna  [address]  [address] [address]      Patient has been released to return to physical therapy on her knee as long as her back and abdominal pain do not interfere. Please call me if you have any questions: [phone]        Sincerely,      Elizabeth Doll MD    (DR. Latesha Millard)

## 2022-03-07 NOTE — TELEPHONE ENCOUNTER
Ok to release her to go back to PT if she feels the pain she has will not stop her from doing the exercises.  If she is not sure can wait for her PCP to return next week

## 2022-03-08 ENCOUNTER — HOSPITAL ENCOUNTER (OUTPATIENT)
Dept: PHYSICAL THERAPY | Age: 62
Discharge: HOME | End: 2022-03-08
Payer: COMMERCIAL

## 2022-03-08 PROCEDURE — 97110 THERAPEUTIC EXERCISES: CPT

## 2022-03-08 PROCEDURE — 97140 MANUAL THERAPY 1/> REGIONS: CPT

## 2022-03-08 NOTE — PROGRESS NOTES
PT DAILY TREATMENT NOTE     Patient Name: Andre Jim  Date:3/8/2022  : 1960  [x]  Patient  Verified  Payor: Eli Lainez / Plan: Mickie Treadwell / Product Type: HMO /    In time:115  Out time:205  Total Treatment Time (min): 50  Visit #: 3 of 10    Medicare/BCBS Only   Total Timed Codes (min):  40 1:1 Treatment Time:  40       Treatment Area: Pain in left knee [M25.562]    SUBJECTIVE  Pain Level (0-10 scale): 4/10  Any medication changes, allergies to medications, adverse drug reactions, diagnosis change, or new procedure performed?: [x] No    [] Yes (see summary sheet for update)  Subjective functional status/changes:   [] No changes reported  Patient reports decreased pain today     OBJECTIVE    Modality rationale: decrease inflammation, decrease pain and increase tissue extensibility to improve the patients ability to perform ADLs   Min Type Additional Details    [] Estim:  []Unatt       []IFC  []Premod                        []Other:  []w/ice   []w/heat  Position:  Location:    [] Estim: []Att    []TENS instruct  []NMES                    []Other:  []w/US   []w/ice   []w/heat  Position:  Location:    []  Traction: [] Cervical       []Lumbar                       [] Prone          []Supine                       []Intermittent   []Continuous Lbs:  [] before manual  [] after manual    []  Ultrasound: []Continuous   [] Pulsed                           []1MHz   []3MHz W/cm2:  Location:    []  Iontophoresis with dexamethasone         Location: [] Take home patch   [] In clinic   10 [x]  Ice     []  heat  []  Ice massage  []  Laser   []  Anodyne Position: Supine  Location: Left knee     []  Laser with stim  []  Other:  Position:  Location:    []  Vasopneumatic Device    []  Right     []  Left  Pre-treatment girth:  Post-treatment girth:  Measured at (location):  Pressure:       [] lo [] med [] hi   Temperature: [] lo [] med [] hi   [x] Skin assessment post-treatment:  [x]intact []redness- no adverse reaction    []redness - adverse reaction:     25 min Therapeutic Exercise:  [] See flow sheet :   Rationale: increase ROM and increase strength to improve the patients ability to perform ADLs    15 min Manual Therapy:  STM/DTM/Edema Massage left posterior knee and distal quad   The manual therapy interventions were performed at a separate and distinct time from the therapeutic activities interventions. Rationale: decrease pain, increase ROM, increase tissue extensibility, decrease edema  and decrease trigger points to perform ADLs     With   [] TE   [] TA   [] neuro   [] other: Patient Education: [x] Review HEP    [] Progressed/Changed HEP based on:   [] positioning   [] body mechanics   [] transfers   [] heat/ice application    [] other:      Other Objective/Functional Measures:   - Progressed TE per flow sheet      Pain Level (0-10 scale) post treatment: 2/10    ASSESSMENT/Changes in Function: Patient actively participates in treatment and tolerate progressions well. Reports slight increase in pain with knee in extension in long sitting. Reports overall decrease in pain at the end of treatment     Patient will continue to benefit from skilled PT services to modify and progress therapeutic interventions, address functional mobility deficits, address ROM deficits, address strength deficits, analyze and address soft tissue restrictions and analyze and cue movement patterns to attain remaining goals.      []  See Plan of Care  []  See progress note/recertification  []  See Discharge Summary         Progress towards goals / Updated goals:  Short Term Goals: To be accomplished in 5 treatments:  1.  Pt will be compliant and independent with HEP in order to facilitate PT sessions and aid with self management             Eval Status:  Initiated             Current Status: Reports compliance (3/8/22)   2.  Pt to tolerate 30 min or more of TE and/or Interventions w/o increased s/s             Eval Status:  Initiated             Current Status: Progressing, tolerated 25 min of TE no increased s/s (3/8/22)      Long Term Goals: To be accomplished in 10 treatments:  1.  Pt will report 50% improvement or better with dysfunction to show a significant increase in ability to tolerate ADL             Eval Status:  Initiated             Current Status:  2.  Pt will have decreased pain at 2/10 or better to allow her to tolerate increased activity levels for progress to return to performing her usual activity             Eval Status:  Pain 5/10             Current Status:  3.  Pt will demonstrate PRE 3x10 reps with little to no difficulty for carryover to return working out 3x/wk with little difficulty               Eval Status:  Not able to do usual workout routine 3x/wk             Current Status:  4.  Pt will ambulate 1/2 mile on TM with a good pace and little to no difficulty for carryover to walking 1 mile or more for exercise with little difficulty                Eval Status:  Not able to walk for exercise             Current Status:  5.  Pt will improve FOTO score to 55 in 14 visits to show significant improvement in function for progress to performing usual activity             Eval Status: FOTO 33              Current Status:     PLAN  [x]  Upgrade activities as tolerated     [x]  Continue plan of care  []  Update interventions per flow sheet       []  Discharge due to:_  []  Other:_      Jamal Leung PTA 3/8/2022  1:17 PM    Future Appointments   Date Time Provider Asael Solano   3/11/2022  2:45 PM Juanjo Barrios PTA [facility]   3/14/2022  3:30 PM Juanjo Barrios PTA [facility]   7/12/2022  8:05 AM [facility] LAB VISIT [facility] AMB   7/19/2022  9:40 AM Rusty Gee MD [facility] AMB

## 2022-03-09 ENCOUNTER — APPOINTMENT (OUTPATIENT)
Dept: PHYSICAL THERAPY | Age: 62
End: 2022-03-09
Payer: COMMERCIAL

## 2022-03-11 ENCOUNTER — HOSPITAL ENCOUNTER (OUTPATIENT)
Dept: PHYSICAL THERAPY | Age: 62
Discharge: HOME | End: 2022-03-11
Payer: COMMERCIAL

## 2022-03-11 PROCEDURE — 97140 MANUAL THERAPY 1/> REGIONS: CPT

## 2022-03-11 PROCEDURE — 97110 THERAPEUTIC EXERCISES: CPT

## 2022-03-11 NOTE — PROGRESS NOTES
PT DAILY TREATMENT NOTE     Patient Name: Tomasa James  GRGZ:  : 1960  [x]  Patient  Verified  Payor: Cameron Smith / Plan: Mandy Caal / Product Type: HMO /    In time:245  Out time:338  Total Treatment Time (min): 48  Visit #: 4 of 10    Medicare/BCBS Only   Total Timed Codes (min):  45 1:1 Treatment Time:  45       Treatment Area: Pain in left knee [M25.562]    SUBJECTIVE  Pain Level (0-10 scale): 10  Any medication changes, allergies to medications, adverse drug reactions, diagnosis change, or new procedure performed?: [x] No    [] Yes (see summary sheet for update)  Subjective functional status/changes:   [] No changes reported  Patient reports increased pain d/t going back to work     OBJECTIVE    Modality rationale: decrease edema, decrease inflammation and decrease pain to improve the patients ability to perform ADLs   Min Type Additional Details    [] Estim:  []Unatt       []IFC  []Premod                        []Other:  []w/ice   []w/heat  Position:  Location:    [] Estim: []Att    []TENS instruct  []NMES                    []Other:  []w/US   []w/ice   []w/heat  Position:  Location:    []  Traction: [] Cervical       []Lumbar                       [] Prone          []Supine                       []Intermittent   []Continuous Lbs:  [] before manual  [] after manual    []  Ultrasound: []Continuous   [] Pulsed                           []1MHz   []3MHz W/cm2:  Location:    []  Iontophoresis with dexamethasone         Location: [] Take home patch   [] In clinic   8 [x]  Ice     []  heat  []  Ice massage  []  Laser   []  Anodyne Position: Long sitting  Location: left knee     []  Laser with stim  []  Other:  Position:  Location:    []  Vasopneumatic Device    []  Right     []  Left  Pre-treatment girth:  Post-treatment girth:  Measured at (location):  Pressure:       [] lo [] med [] hi   Temperature: [] lo [] med [] hi   [] Skin assessment post-treatment:  []intact []redness- no adverse reaction    []redness - adverse reaction:     33 min Therapeutic Exercise:  [x] See flow sheet :   Rationale: increase ROM and increase strength to improve the patients ability to perform ADLs    12 min Manual Therapy:  Edema massage, STM/DTM distal quad    The manual therapy interventions were performed at a separate and distinct time from the therapeutic activities interventions. Rationale: decrease pain, increase ROM, increase tissue extensibility, decrease edema  and decrease trigger points to perform ADLs    With   [x] TE   [] TA   [] neuro   [] other: Patient Education: [x] Review HEP    [] Progressed/Changed HEP based on:   [] positioning   [] body mechanics   [] transfers   [] heat/ice application    [] other:      Other Objective/Functional Measures:   - increased sit to stands and mini squats to 2x10  - Increased SLR to 2x10  - Increased step ups to 6\" box for 2x10     Pain Level (0-10 scale) post treatment: 2.5/10    ASSESSMENT/Changes in Function: Patient actively participates in therapy and tolerates progressions well with no c/o increased pain       Patient will continue to benefit from skilled PT services to modify and progress therapeutic interventions, address functional mobility deficits, address ROM deficits, address strength deficits, analyze and address soft tissue restrictions and analyze and cue movement patterns to attain remaining goals.      []  See Plan of Care  []  See progress note/recertification  []  See Discharge Summary         Progress towards goals / Updated goals:  Short Term Goals: To be accomplished in 5 treatments:  1.  Pt will be compliant and independent with HEP in order to facilitate PT sessions and aid with self management             Eval Status:  Initiated             Current Status: Reports compliance (3/8/22)   2.  Pt to tolerate 30 min or more of TE and/or Interventions w/o increased s/s             Eval Status:  Initiated             Current Status: Progressing, tolerated 25 min of TE no increased s/s (3/8/22)      Long Term Goals: To be accomplished in 10 treatments:  1.  Pt will report 50% improvement or better with dysfunction to show a significant increase in ability to tolerate ADL             Eval Status:  Initiated             Current Status:  2.  Pt will have decreased pain at 2/10 or better to allow her to tolerate increased activity levels for progress to return to performing her usual activity             Eval Status:  Pain 5/10             Current Status:  3.  Pt will demonstrate PRE 3x10 reps with little to no difficulty for carryover to return working out 3x/wk with little difficulty               Eval Status:  Not able to do usual workout routine 3x/wk             Current Status:  4.  Pt will ambulate 1/2 mile on TM with a good pace and little to no difficulty for carryover to walking 1 mile or more for exercise with little difficulty                Eval Status:  Not able to walk for exercise             Current Status:  5.  Pt will improve FOTO score to 55 in 14 visits to show significant improvement in function for progress to performing usual activity             Eval Status: FOTO 33              Current Status:     PLAN  [x]  Upgrade activities as tolerated     [x]  Continue plan of care  []  Update interventions per flow sheet       []  Discharge due to:_  []  Other:_      NATA Herrera 3/11/2022  2:53 PM    Future Appointments   Date Time Provider Asael Solano   3/14/2022  3:30 PM Yancy Armas PTA [place] [facility]   7/12/2022  8:05 AM [facility] LAB VISIT [facility] BS AMB   7/19/2022  9:40 AM MD KELSEY Mckeon BS AMB

## 2022-03-13 ENCOUNTER — TRANSCRIBE ORDER (OUTPATIENT)
Dept: SCHEDULING | Age: 62
End: 2022-03-13

## 2022-03-13 DIAGNOSIS — K76.9 LIVER LESION: Primary | ICD-10-CM

## 2022-03-13 DIAGNOSIS — E66.3 OVER WEIGHT: ICD-10-CM

## 2022-03-13 DIAGNOSIS — K85.90 PANCREATITIS, ACUTE: ICD-10-CM

## 2022-03-13 DIAGNOSIS — R79.89 LIVER FUNCTION TEST ABNORMALITY: ICD-10-CM

## 2022-03-13 DIAGNOSIS — R10.13 EPIGASTRIC PAIN: ICD-10-CM

## 2022-03-14 ENCOUNTER — HOSPITAL ENCOUNTER (OUTPATIENT)
Dept: PHYSICAL THERAPY | Age: 62
Discharge: HOME | End: 2022-03-14
Payer: COMMERCIAL

## 2022-03-14 PROCEDURE — 97140 MANUAL THERAPY 1/> REGIONS: CPT

## 2022-03-14 PROCEDURE — 97110 THERAPEUTIC EXERCISES: CPT

## 2022-03-14 NOTE — PROGRESS NOTES
PT DAILY TREATMENT NOTE     Patient Name: Shasha Mena  GCWS:[id number]  : 1960  [x]  Patient  Verified  Payor: Darcia Boxer / Plan: Tammi Davis / Product Type: HMO /    In time:328  Out time:425  Total Treatment Time (min): 58  Visit #: 5 of 10    Medicare/BCBS Only   Total Timed Codes (min):  48 1:1 Treatment Time:  48       Treatment Area: Pain in left knee [M25.562]    SUBJECTIVE  Pain Level (0-10 scale): 3/10  Any medication changes, allergies to medications, adverse drug reactions, diagnosis change, or new procedure performed?: [x] No    [] Yes (see summary sheet for update)  Subjective functional status/changes:   [] No changes reported  Patient reports decreased pain compared to last visit d/t decreased work hours     OBJECTIVE    Modality rationale: decrease inflammation, decrease pain and increase tissue extensibility to improve the patients ability to perform ADLs   Min Type Additional Details    [] Estim:  []Unatt       []IFC  []Premod                        []Other:  []w/ice   []w/heat  Position:  Location:    [] Estim: []Att    []TENS instruct  []NMES                    []Other:  []w/US   []w/ice   []w/heat  Position:  Location:    []  Traction: [] Cervical       []Lumbar                       [] Prone          []Supine                       []Intermittent   []Continuous Lbs:  [] before manual  [] after manual    []  Ultrasound: []Continuous   [] Pulsed                           []1MHz   []3MHz W/cm2:  Location:    []  Iontophoresis with dexamethasone         Location: [] Take home patch   [] In clinic   10 [x]  Ice     []  heat  []  Ice massage  []  Laser   []  Anodyne Position: Long sitting  Location: Left knee     []  Laser with stim  []  Other:  Position:  Location:    []  Vasopneumatic Device    []  Right     []  Left  Pre-treatment girth:  Post-treatment girth:  Measured at (location):  Pressure:       [] lo [] med [] hi   Temperature: [] lo [] med [] hi   [x] Skin assessment post-treatment:  [x]intact []redness- no adverse reaction    []redness - adverse reaction:     38 min Therapeutic Exercise:  [x] See flow sheet :   Rationale: increase ROM and increase strength to improve the patients ability to perform ADLs     10 min Manual Therapy:  Edema massage, STM/DTM distal quad    The manual therapy interventions were performed at a separate and distinct time from the therapeutic activities interventions. Rationale: decrease pain, increase ROM, increase tissue extensibility, decrease edema  and decrease trigger points to perform ADLs        With   [] TE   [] TA   [] neuro   [] other: Patient Education: [x] Review HEP    [] Progressed/Changed HEP based on:   [] positioning   [] body mechanics   [] transfers   [] heat/ice application    [] other:      Other Objective/Functional Measures:   - Added 2# ankle weight to SAQ and SLR for 2x10     Pain Level (0-10 scale) post treatment: 1/10    ASSESSMENT/Changes in Function: Patient actively participates in therapy and tolerates progressions well with no c/o increased pain. Continued swelling but is showing improvement. Patient will continue to benefit from skilled PT services to modify and progress therapeutic interventions, address functional mobility deficits, address ROM deficits, address strength deficits, analyze and address soft tissue restrictions and analyze and cue movement patterns to attain remaining goals.      []  See Plan of Care  []  See progress note/recertification  []  See Discharge Summary         Progress towards goals / Updated goals:  Short Term Goals: To be accomplished in 5 treatments:  1.  Pt will be compliant and independent with HEP in order to facilitate PT sessions and aid with self management             Eval Status:  Initiated             Current Status: Reports compliance (3/8/22)   2.  Pt to tolerate 30 min or more of TE and/or Interventions w/o increased s/s             Eval Status:  Initiated             Current Status: Progressing, tolerated 25 min of TE no increased s/s (3/8/22)      Long Term Goals: To be accomplished in 10 treatments:  1.  Pt will report 50% improvement or better with dysfunction to show a significant increase in ability to tolerate ADL             Eval Status:  Initiated             Current Status:  2.  Pt will have decreased pain at 2/10 or better to allow her to tolerate increased activity levels for progress to return to performing her usual activity             Eval Status:  Pain 5/10             Current Status:  3.  Pt will demonstrate PRE 3x10 reps with little to no difficulty for carryover to return working out 3x/wk with little difficulty               Eval Status:  Not able to do usual workout routine 3x/wk             Current Status:  4.  Pt will ambulate 1/2 mile on TM with a good pace and little to no difficulty for carryover to walking 1 mile or more for exercise with little difficulty                Eval Status:  Not able to walk for exercise             Current Status:  5.  Pt will improve FOTO score to 55 in 14 visits to show significant improvement in function for progress to performing usual activity             Eval Status: FOTO 33              Current Status:     PLAN  [x]  Upgrade activities as tolerated     [x]  Continue plan of care  []  Update interventions per flow sheet       []  Discharge due to:_  []  Other:_      Daisy Sousa PTA 3/14/2022  3:31 PM    Future Appointments   Date Time Provider Asael Solano   3/16/2022  2:00 PM Omari Javier PTA [facility]   3/18/2022  4:15 PM Omrai Javier PTA [facility]   7/12/2022  8:05 AM [facility] LAB VISIT [facility] AMB   7/19/2022  9:40 AM Cholo Marin MD [facility] AMB

## 2022-03-16 ENCOUNTER — HOSPITAL ENCOUNTER (OUTPATIENT)
Dept: PHYSICAL THERAPY | Age: 62
Discharge: HOME | End: 2022-03-16
Payer: COMMERCIAL

## 2022-03-16 PROCEDURE — 97110 THERAPEUTIC EXERCISES: CPT

## 2022-03-16 PROCEDURE — 97140 MANUAL THERAPY 1/> REGIONS: CPT

## 2022-03-16 NOTE — PROGRESS NOTES
PT DAILY TREATMENT NOTE     Patient Name: Kaiden Hartley  Date:3/16/2022  : 1960  [x]  Patient  Verified  Payor: Mariaa Acevedo / Plan: Annalise Meléndez / Product Type: HMO /    In time:200  Out time:256  Total Treatment Time (min): 56  Visit #: 6 of 10    Medicare/BCBS Only   Total Timed Codes (min):  46 1:1 Treatment Time:  55       Treatment Area: Pain in left knee [M25.562]    SUBJECTIVE  Pain Level (0-10 scale): 2/10  Any medication changes, allergies to medications, adverse drug reactions, diagnosis change, or new procedure performed?: [x] No    [] Yes (see summary sheet for update)  Subjective functional status/changes:   [] No changes reported  Patient reports decreased pain today     OBJECTIVE    Modality rationale: decrease inflammation and decrease pain to improve the patients ability to perform ADLs   Min Type Additional Details    [] Estim:  []Unatt       []IFC  []Premod                        []Other:  []w/ice   []w/heat  Position:  Location:    [] Estim: []Att    []TENS instruct  []NMES                    []Other:  []w/US   []w/ice   []w/heat  Position:  Location:    []  Traction: [] Cervical       []Lumbar                       [] Prone          []Supine                       []Intermittent   []Continuous Lbs:  [] before manual  [] after manual    []  Ultrasound: []Continuous   [] Pulsed                           []1MHz   []3MHz W/cm2:  Location:    []  Iontophoresis with dexamethasone         Location: [] Take home patch   [] In clinic   10 [x]  Ice     []  heat  []  Ice massage  []  Laser   []  Anodyne Position: Long sitting   Location: Left knee     []  Laser with stim  []  Other:  Position:  Location:    []  Vasopneumatic Device    []  Right     []  Left  Pre-treatment girth:  Post-treatment girth:  Measured at (location):  Pressure:       [] lo [] med [] hi   Temperature: [] lo [] med [] hi   [x] Skin assessment post-treatment:  [x]intact []redness- no adverse reaction []redness - adverse reaction:     36 min Therapeutic Exercise:  [x] See flow sheet :   Rationale: increase ROM and increase strength to improve the patients ability to perform ADLs    10 min Manual Therapy:  Edema massage, STM/DTM distal quad   The manual therapy interventions were performed at a separate and distinct time from the therapeutic activities interventions. Rationale: decrease pain, increase ROM, increase tissue extensibility, decrease edema  and decrease trigger points to perform ADLs       With   [x] TE   [] TA   [] neuro   [] other: Patient Education: [x] Review HEP    [] Progressed/Changed HEP based on:   [] positioning   [] body mechanics   [] transfers   [] heat/ice application    [] other:      Other Objective/Functional Measures:   - Increased SAQ to 3x10  - Increased mini squats and sit-stands to 3x10   - Initiated quad set in standing      Pain Level (0-10 scale) post treatment: 0/10    ASSESSMENT/Changes in Function: Patient actively participates in therapy and tolerates progressions well with no c/o increased pain       Patient will continue to benefit from skilled PT services to modify and progress therapeutic interventions, address functional mobility deficits, address ROM deficits, address strength deficits, analyze and address soft tissue restrictions and analyze and cue movement patterns to attain remaining goals.      []  See Plan of Care  []  See progress note/recertification  []  See Discharge Summary         Progress towards goals / Updated goals:  Short Term Goals: To be accomplished in 5 treatments:  1.  Pt will be compliant and independent with HEP in order to facilitate PT sessions and aid with self management             Eval Status:  Initiated             Current Status: Reports compliance (3/8/22)   2.  Pt to tolerate 30 min or more of TE and/or Interventions w/o increased s/s             Eval Status:  Initiated             Current Status: Progressing, tolerated 25 min of TE no increased s/s (3/8/22)      Long Term Goals: To be accomplished in 10 treatments:  1.  Pt will report 50% improvement or better with dysfunction to show a significant increase in ability to tolerate ADL             Eval Status:  Initiated             Current Status: Met, patient reports 50% improvement (3/16/22)   2.  Pt will have decreased pain at 2/10 or better to allow her to tolerate increased activity levels for progress to return to performing her usual activity             Eval Status:  Pain 5/10             Current Status: Progressing patient reports 4 or less pain the last week (3/16/22)   3.  Pt will demonstrate PRE 3x10 reps with little to no difficulty for carryover to return working out 3x/wk with little difficulty               Eval Status:  Not able to do usual workout routine 3x/wk             Current Status: Met patient able to tolerate 3x10 on multiple exercises with no c/o pain (3/16/22)   4.  Pt will ambulate 1/2 mile on TM with a good pace and little to no difficulty for carryover to walking 1 mile or more for exercise with little difficulty                Eval Status:  Not able to walk for exercise             Current Status:  5.  Pt will improve FOTO score to 55 in 14 visits to show significant improvement in function for progress to performing usual activity             Eval Status: FOTO 33              Current Status:     PLAN  [x]  Upgrade activities as tolerated     [x]  Continue plan of care  []  Update interventions per flow sheet       []  Discharge due to:_  []  Other:_      Chito Golden PTA 3/16/2022  2:01 PM    Future Appointments   Date Time Provider Asael Solano   3/18/2022  4:15 PM Esperanza Coburn PTA [place] [facility]   7/12/2022  8:05 AM [facility] LAB VISIT [facility] BS AMB   7/19/2022  9:40 AM Lili Francis MD [facility] AMB

## 2022-03-18 ENCOUNTER — HOSPITAL ENCOUNTER (OUTPATIENT)
Dept: PHYSICAL THERAPY | Age: 62
Discharge: HOME | End: 2022-03-18
Payer: COMMERCIAL

## 2022-03-18 PROBLEM — R73.01 IFG (IMPAIRED FASTING GLUCOSE): Status: ACTIVE | Noted: 2017-01-01

## 2022-03-18 PROBLEM — M17.9 OA (OSTEOARTHRITIS) OF KNEE: Status: ACTIVE | Noted: 2022-02-17

## 2022-03-18 PROBLEM — E05.00 TOXIC DIFFUSE GOITER WITHOUT CRISIS: Status: ACTIVE | Noted: 2018-10-30

## 2022-03-18 PROCEDURE — 97110 THERAPEUTIC EXERCISES: CPT

## 2022-03-18 PROCEDURE — 97140 MANUAL THERAPY 1/> REGIONS: CPT

## 2022-03-18 NOTE — PROGRESS NOTES
[facility] at [address] [facility], [address] [place] [address]  Phone: [phone]      Fax:  [phone]    Progress Note  Patient name: Amber Shea Start of Care: 2022   Referral source: Chitra Nobles MD : 1960               Medical Diagnosis: Pain in left knee [M25.562]  Payor: Steve Hammond / Plan: Byron Petersen / Product Type: HMO /  Onset Date:22               Treatment Diagnosis: Left Knee Pain   Prior Hospitalization: see medical history Provider#: [id number]   Medications: Verified on Patient summary List   Comorbidities: OA, Thyroid Problems, HTN, Visual Impaired  Prior Level of Function: able to do usual workout routine at least 3 times a week                           Visits from Start of Care: 7    Missed Visits: 0    Established Goals:      Short Term Goals: To be accomplished in 5 treatments:  1.  Pt will be compliant and independent with HEP in order to facilitate PT sessions and aid with self management             Eval Status:  Initiated             Current Status: met, Reports compliance    2.  Pt to tolerate 30 min or more of TE and/or Interventions w/o increased s/s             Eval Status:  Initiated             Current Status: Met , tolerated 36 min of TE no increased s/s       Long Term Goals: To be accomplished in 10 treatments:  1.  Pt will report 50% improvement or better with dysfunction to show a significant increase in ability to tolerate ADL             Eval Status:  Initiated             Current Status: Met, patient reports 50% improvement (3/16/22)   2.  Pt will have decreased pain at 2/10 or better to allow her to tolerate increased activity levels for progress to return to performing her usual activity             Eval Status:  Pain 5/10             Current Status: Met, patient reports 2/10 pain   3.  Pt will demonstrate PRE 3x10 reps with little to no difficulty for carryover to return working out 3x/wk with little difficulty               Eval Status:  Not able to do usual workout routine 3x/wk             Current Status: Met patient able to tolerate 3x10 on multiple exercises with no c/o pain  4.  Pt will ambulate 1/2 mile on TM with a good pace and little to no difficulty for carryover to walking 1 mile or more for exercise with little difficulty                Eval Status:  Not able to walk for exercise             Current Status: Progressing, 150' no pain    5.  Pt will improve FOTO score to 55 in 14 visits to show significant improvement in function for progress to performing usual activity             Eval Status: FOTO 33              Current Status: Met, FOTO = 64     Key Functional Changes: Patient is showing improved function with her current treatment program but it is not sustained to this point. Patient will continue to benefit from skilled PT to address impairments and continue to improve functional mobility and tolerance to daily activity. Reports difficulty with sleeping at night, waking up 3-4x per night d/t discomfort in the left knee. Reports stiffness and discomfort with left knee flx/ext. Updated Goals: to be achieved in 10 treatments:  1. Pt will ambulate 1/2 mile on TM with a good pace and little to no difficulty for carryover to walking 1 mile or more for exercise with little difficulty                Progress Note Status:  Progressing, 150' no pain              Current Status:  2. Pt will demo Left AROM knee flexion 120*and extension 10* or better without c/o discomfort to increase tolerance. Progress Note Status: AROM left knee flexion 115* and 13* extension with c/o discomfort    Current Status:   3. Patient will report decreased discomfort throughout the night to improve sleep.    Progress Note Status: Patient reports waking up 3-4x per night d/t discomfort in the left knee   Current Status:   ASSESSMENT/RECOMMENDATIONS:  [x]Continue therapy per initial plan/protocol at a frequency of  2-3 x per week for 10 visits  []Continue therapy with the following recommended changes:_____________________      _____________________________________________________________________  []Discontinue therapy progressing towards or have reached established goals  []Discontinue therapy due to lack of appreciable progress towards goals  []Discontinue therapy due to lack of attendance or compliance  []Await Physician's recommendations/decisions regarding therapy  []Other:________________________________________________________________    Thank you for this referral.   Kelsey Whitlock, PTA 3/18/2022 4:16 PM  CLARK Abdi   NOTE TO PHYSICIAN:  [address] TO [facility]: [phone]  If you are unable to process this request in 24 hours please contact our office:   [phone]  []  I have read the above report and request that my patient continue as recommended. []  I have read the above report and request that my patient continue therapy with the following changes/special instructions:________________________________________  []I have read the above report and request that my patient be discharged from therapy.     Physician's Signature:____________Date:_________TIME:________     Chitra Nobles MD  ** Signature, Date and Time must be completed for valid certification **

## 2022-03-18 NOTE — PROGRESS NOTES
PT DAILY TREATMENT NOTE     Patient Name: Samreen Luna  Date:3/18/2022  : 1960  [x]  Patient  Verified  Payor: Gerda Virk / Plan: Baldemar Frederick / Product Type: HMO /    In time:410  Out time:510  Total Treatment Time (min): 60  Visit #: 1 of 10    Medicare/BCBS Only   Total Timed Codes (min):  50 1:1 Treatment Time:  50       Treatment Area: Pain in left knee [M25.562]    SUBJECTIVE  Pain Level (0-10 scale): 2/10  Any medication changes, allergies to medications, adverse drug reactions, diagnosis change, or new procedure performed?: [x] No    [] Yes (see summary sheet for update)  Subjective functional status/changes:   [] No changes reported  Continues to report 2/10 pain     OBJECTIVE    Modality rationale: decrease inflammation and decrease pain to improve the patients ability to perform ADLs   Min Type Additional Details    [] Estim:  []Unatt       []IFC  []Premod                        []Other:  []w/ice   []w/heat  Position:  Location:    [] Estim: []Att    []TENS instruct  []NMES                    []Other:  []w/US   []w/ice   []w/heat  Position:  Location:    []  Traction: [] Cervical       []Lumbar                       [] Prone          []Supine                       []Intermittent   []Continuous Lbs:  [] before manual  [] after manual    []  Ultrasound: []Continuous   [] Pulsed                           []1MHz   []3MHz W/cm2:  Location:    []  Iontophoresis with dexamethasone         Location: [] Take home patch   [] In clinic   10 [x]  Ice     []  heat  []  Ice massage  []  Laser   []  Anodyne Position: Long sitting  Location: Left knee     []  Laser with stim  []  Other:  Position:  Location:    []  Vasopneumatic Device    []  Right     []  Left  Pre-treatment girth:  Post-treatment girth:  Measured at (location):  Pressure:       [] lo [] med [] hi   Temperature: [] lo [] med [] hi   [x] Skin assessment post-treatment:  [x]intact []redness- no adverse reaction    []redness - adverse reaction:     35 min Therapeutic Exercise:  [x] See flow sheet :   Rationale: increase ROM and increase strength to improve the patients ability to perform ADLs    15 min Manual Therapy:  STM/DTM/TPR posterior knee/hamstring/distal quad. Gentle PROM with overstretch at end range. Gentle flx/ext mobs. Edema Massage   The manual therapy interventions were performed at a separate and distinct time from the therapeutic activities interventions. Rationale: decrease pain, increase ROM, increase tissue extensibility, decrease edema  and decrease trigger points to perform ADLs          With   [] TE   [] TA   [] neuro   [] other: Patient Education: [x] Review HEP    [] Progressed/Changed HEP based on:   [] positioning   [] body mechanics   [] transfers   [] heat/ice application    [] other:      Other Objective/Functional Measures:   - FOTO = 64  - AROM left knee * after MT   - TTP with tightness posterior knee      Pain Level (0-10 scale) post treatment: 0/10    ASSESSMENT/Changes in Function: Patient actively participates in therapy and tolerates progressions well with no c/o increased pain. Reports interrupted sleep throughout the nights d/t discomfort in the left knee. Continues to report difficulty with flx/ext of the left knee without added discomfort. Patient will continue to benefit from skilled PT services to modify and progress therapeutic interventions, address functional mobility deficits, address ROM deficits, address strength deficits, analyze and address soft tissue restrictions and analyze and cue movement patterns to attain remaining goals.      []  See Plan of Care  []  See progress note/recertification  []  See Discharge Summary         Progress towards goals / Updated goals:  1. Pt will ambulate 1/2 mile on TM with a good pace and little to no difficulty for carryover to walking 1 mile or more for exercise with little difficulty                Progress Note Status:  Progressing, 150' no pain              Current Status:  2. Pt will demo Left AROM knee flexion 120*and extension 10* or better without c/o discomfort to increase tolerance. Progress Note Status: AROM left knee flexion 115* and 13* extension with c/o discomfort              Current Status:   3. Patient will report decreased discomfort throughout the night to improve sleep.               Progress Note Status: Patient reports waking up 3-4x per night d/t discomfort in the left knee              Current Status:     PLAN  [x]  Upgrade activities as tolerated     [x]  Continue plan of care  []  Update interventions per flow sheet       []  Discharge due to:_  []  Other:_      Iza [facility], [facility] 3/18/2022  5:15 PM    Future Appointments   Date Time Provider Asael Solano   3/23/2022  2:45 PM Bobbe Bold, PTA [facility]   3/25/2022  2:45 PM Bobbe Bold, PTA [facility]   3/28/2022  2:45 PM Bobbe Bold, PTA [facility]   3/30/2022  2:45 PM Bobbe Bold, PTA [facility]   4/1/2022  2:45 PM Bobbe Bold, PTA [facility]   4/4/2022  2:45 PM Bobbe Bold, PTA [facility]   4/6/2022  2:45 PM Bobbe Bold, PTA [facility]   4/8/2022  2:45 PM Bobbe Bold, PTA [facility]   4/18/2022  2:45 PM Bobbe Bold, PTA [facility]   4/20/2022  2:45 PM Alverto Rodgers PTA [facility]   7/12/2022  8:05 AM [facility] LAB VISIT [facility] BS AMB   7/19/2022  9:40 AM Nelly Foy MD [facility] AMB

## 2022-03-19 PROBLEM — K85.90 PANCREATITIS: Status: ACTIVE | Noted: 2022-02-21

## 2022-03-20 PROBLEM — E05.90 HYPERTHYROIDISM: Status: ACTIVE | Noted: 2018-07-08

## 2022-03-23 ENCOUNTER — HOSPITAL ENCOUNTER (OUTPATIENT)
Dept: PHYSICAL THERAPY | Age: 62
Discharge: HOME | End: 2022-03-23
Payer: COMMERCIAL

## 2022-03-23 PROCEDURE — 97110 THERAPEUTIC EXERCISES: CPT

## 2022-03-23 PROCEDURE — 97140 MANUAL THERAPY 1/> REGIONS: CPT

## 2022-03-23 NOTE — PROGRESS NOTES
PT DAILY TREATMENT NOTE     Patient Name: Sulma Rivero  Date:3/23/2022  : 1960  [x]  Patient  Verified  Payor: Arlene Padilla / Plan: Atif Choudhary / Product Type: HMO /    In time:240  Out time:345  Total Treatment Time (min): 65  Visit #: 2 of 10    Medicare/BCBS Only   Total Timed Codes (min):  57 1:1 Treatment Time:  62       Treatment Area: Pain in left knee [M25.562]    SUBJECTIVE  Pain Level (0-10 scale): 4-5/10  Any medication changes, allergies to medications, adverse drug reactions, diagnosis change, or new procedure performed?: [x] No    [] Yes (see summary sheet for update)  Subjective functional status/changes:   [] No changes reported  Patient reports increased pain, increased swelling in left LE d/t increased time seated at work     OBJECTIVE    Modality rationale: decrease edema, decrease inflammation and decrease pain to improve the patients ability to perform ADLs   Min Type Additional Details    [] Estim:  []Unatt       []IFC  []Premod                        []Other:  []w/ice   []w/heat  Position:  Location:    [] Estim: []Att    []TENS instruct  []NMES                    []Other:  []w/US   []w/ice   []w/heat  Position:  Location:    []  Traction: [] Cervical       []Lumbar                       [] Prone          []Supine                       []Intermittent   []Continuous Lbs:  [] before manual  [] after manual    []  Ultrasound: []Continuous   [] Pulsed                           []1MHz   []3MHz W/cm2:  Location:    []  Iontophoresis with dexamethasone         Location: [] Take home patch   [] In clinic   8 [x]  Ice     []  heat  []  Ice massage  []  Laser   []  Anodyne Position: Long sitting  Location: left knee     []  Laser with stim  []  Other:  Position:  Location:    []  Vasopneumatic Device    []  Right     []  Left  Pre-treatment girth:  Post-treatment girth:  Measured at (location):  Pressure:       [] lo [] med [] hi   Temperature: [] lo [] med [] hi   [x] Skin assessment post-treatment:  [x]intact []redness- no adverse reaction    []redness - adverse reaction:       37 min Therapeutic Exercise:  [] See flow sheet :   Rationale: increase ROM and increase strength to improve the patients ability to perform ADLs    20 min Manual Therapy:  Effleurage left LE    The manual therapy interventions were performed at a separate and distinct time from the therapeutic activities interventions. Rationale: decrease pain, increase ROM, increase tissue extensibility and decrease edema  to perform ADLs          With   [] TE   [] TA   [] neuro   [] other: Patient Education: [x] Review HEP    [] Progressed/Changed HEP based on:   [] positioning   [] body mechanics   [] transfers   [] heat/ice application    [] other:      Other Objective/Functional Measures:   - Increased pain today d/t returning to work  - MT to decrease swelling in LE      Pain Level (0-10 scale) post treatment: 3/10    ASSESSMENT/Changes in Function: patient actively participates in therapy, modified program today d/t increased pain and swelling. Patient will continue to benefit from skilled PT services to modify and progress therapeutic interventions, address functional mobility deficits, address ROM deficits, address strength deficits, analyze and address soft tissue restrictions and analyze and cue movement patterns to attain remaining goals. []  See Plan of Care  []  See progress note/recertification  []  See Discharge Summary         Progress towards goals / Updated goals:  Progress towards goals / Updated goals:  1. Pt will ambulate 1/2 mile on TM with a good pace and little to no difficulty for carryover to walking 1 mile or more for exercise with little difficulty                Progress Note Status:  Progressing, 150' no pain              Current Status:  2.  Pt will demo Left AROM knee flexion 120*and extension 10* or better without c/o discomfort to increase tolerance.              Progress Note Status: AROM left knee flexion 115* and 13* extension with c/o discomfort              Current Status:   3. Patient will report decreased discomfort throughout the night to improve sleep.              Progress Note Status: Patient reports waking up 3-4x per night d/t discomfort in the left knee              ZXCKAKO Status:     PLAN  [x]  Upgrade activities as tolerated     [x]  Continue plan of care  []  Update interventions per flow sheet       []  Discharge due to:_  []  Other:_      Neto Farooq PTA 3/23/2022  2:48 PM    Future Appointments   Date Time Provider Asael Solano   3/25/2022  2:45 PM Fayrene Showman, PTA [facility]   3/28/2022  2:45 PM Fayrene Showman, PTA [facility]   3/30/2022  2:45 PM Fayrene Showman, PTA [facility]   4/1/2022  2:45 PM Fayrene Showman, PTA [facility]   4/4/2022  2:45 PM Fayrene Showman, PTA [facility]   4/6/2022  2:45 PM Fayrene Showman, PTA [facility]   4/8/2022  2:45 PM Fayrene Showman, PTA [facility]   4/18/2022  2:45 PM Fayrene Showman, PTA [facility]   4/20/2022  2:45 PM Fayrene Showman, PTA [facility]   7/12/2022  8:05 AM [facility] LAB VISIT [facility] BS AMB   7/19/2022  9:40 AM Lazarus Plata MD [facility] BS AMB

## 2022-03-25 ENCOUNTER — HOSPITAL ENCOUNTER (OUTPATIENT)
Dept: PHYSICAL THERAPY | Age: 62
Discharge: HOME | End: 2022-03-25
Payer: COMMERCIAL

## 2022-03-25 PROCEDURE — 97110 THERAPEUTIC EXERCISES: CPT

## 2022-03-25 PROCEDURE — 97140 MANUAL THERAPY 1/> REGIONS: CPT

## 2022-03-25 NOTE — PROGRESS NOTES
PT DAILY TREATMENT NOTE     Patient Name: Sam Jamil  Date:3/25/2022  : 1960  [x]  Patient  Verified  Payor: [place] / Plan: Carmella Gandhi / Product Type: HMO /    In time:241  Out time:338  Total Treatment Time (min): 62  Visit #: 3 of 10    Medicare/BCBS Only   Total Timed Codes (min):  47 1:1 Treatment Time:  52       Treatment Area: Pain in left knee [M25.562]    SUBJECTIVE  Pain Level (0-10 scale): 2-3/10  Any medication changes, allergies to medications, adverse drug reactions, diagnosis change, or new procedure performed?: [x] No    [] Yes (see summary sheet for update)  Subjective functional status/changes:   [] No changes reported  Reports minimal pain with stiffness     OBJECTIVE    Modality rationale: decrease edema, decrease inflammation and decrease pain to improve the patients ability to perform ADLs   Min Type Additional Details    [] Estim:  []Unatt       []IFC  []Premod                        []Other:  []w/ice   []w/heat  Position:  Location:    [] Estim: []Att    []TENS instruct  []NMES                    []Other:  []w/US   []w/ice   []w/heat  Position:  Location:    []  Traction: [] Cervical       []Lumbar                       [] Prone          []Supine                       []Intermittent   []Continuous Lbs:  [] before manual  [] after manual    []  Ultrasound: []Continuous   [] Pulsed                           []1MHz   []3MHz W/cm2:  Location:    []  Iontophoresis with dexamethasone         Location: [] Take home patch   [] In clinic   10 [x]  Ice     []  heat  []  Ice massage  []  Laser   []  Anodyne Position: Long sitting  Location: left knee     []  Laser with stim  []  Other:  Position:  Location:    []  Vasopneumatic Device    []  Right     []  Left  Pre-treatment girth:  Post-treatment girth:  Measured at (location):  Pressure:       [] lo [] med [] hi   Temperature: [] lo [] med [] hi   [x] Skin assessment post-treatment:  [x]intact []redness- no adverse reaction    []redness - adverse reaction:     32 min Therapeutic Exercise:  [] See flow sheet :   Rationale: increase ROM and increase strength to improve the patients ability to perform ADLs    15 min Manual Therapy:  Effleurage left ankle/shin/knee/quad    The manual therapy interventions were performed at a separate and distinct time from the therapeutic activities interventions. Rationale: decrease pain, increase ROM and decrease edema  to perform ADLs       With   [x] TE   [] TA   [] neuro   [] other: Patient Education: [x] Review HEP    [] Progressed/Changed HEP based on:   [] positioning   [] body mechanics   [] transfers   [] heat/ice application    [] other:      Other Objective/Functional Measures:   - MT to decrease swelling and stiffness in the joint     Pain Level (0-10 scale) post treatment: 0/10    ASSESSMENT/Changes in Function: Patient actively participates in therapy and tolerates progressions well with no c/o increased pain       Patient will continue to benefit from skilled PT services to modify and progress therapeutic interventions, address functional mobility deficits, address ROM deficits, address strength deficits, analyze and address soft tissue restrictions and analyze and cue movement patterns to attain remaining goals. []  See Plan of Care  []  See progress note/recertification  []  See Discharge Summary         Progress towards goals / Updated goals:  1. Pt will ambulate 1/2 mile on TM with a good pace and little to no difficulty for carryover to walking 1 mile or more for exercise with little difficulty                Progress Note Status:  Progressing, 150' no pain              Current Status:  2.  Pt will demo Left AROM knee flexion 120*and extension 10* or better without c/o discomfort to increase tolerance.              Progress Note Status: AROM left knee flexion 115* and 13* extension with c/o discomfort              Current Status:   3. Patient will report decreased discomfort throughout the night to improve sleep.              Progress Note Status: Patient reports waking up 3-4x per night d/t discomfort in the left knee              RHSHKHQ Status:        PLAN  [x]  Upgrade activities as tolerated     [x]  Continue plan of care  []  Update interventions per flow sheet       []  Discharge due to:_  []  Other:_      Kecia Vargas PTA 3/25/2022  3:02 PM    Future Appointments   Date Time Provider Asael Solano   3/28/2022  2:45 PM Aaron Rodriguez, PTA [facility]   3/30/2022  2:45 PM Aarondavid Rodriguez, PTA [facility]   4/1/2022  2:45 PM Aarondavid Rodriguez, PTA [facility]   4/4/2022  2:45 PM Aarondavid Rodriguez, PTA [facility]   4/6/2022  2:45 PM Aarondavid Rodriguez, PTA [facility]   4/8/2022  2:45 PM Aarondavid Rodriguez, PTA [facility]   4/18/2022  2:45 PM Aaron Rodriguez, PTA [facility]   4/20/2022  2:45 PM Aarondavid Rodriguez, PTA [facility]   7/12/2022  8:05 AM [facility] LAB VISIT IO BS AMB   7/19/2022  9:40 AM Greg Gant MD IO BS AMB

## 2022-03-28 ENCOUNTER — HOSPITAL ENCOUNTER (OUTPATIENT)
Dept: PHYSICAL THERAPY | Age: 62
Discharge: HOME | End: 2022-03-28
Payer: COMMERCIAL

## 2022-03-28 PROCEDURE — 97140 MANUAL THERAPY 1/> REGIONS: CPT

## 2022-03-28 PROCEDURE — 97110 THERAPEUTIC EXERCISES: CPT

## 2022-03-28 NOTE — PROGRESS NOTES
PT DAILY TREATMENT NOTE     Patient Name: Kirill Vincent  Date:3/28/2022  : 1960  [x]  Patient  Verified  Payor: Beni Castelan / Plan: Maria Elena Davison / Product Type: HMO /    In time:245  Out time:343  Total Treatment Time (min): 58  Visit #: 4 of 10    Medicare/BCBS Only   Total Timed Codes (min):  50 1:1 Treatment Time:  50       Treatment Area: Pain in left knee [M25.562]    SUBJECTIVE  Pain Level (0-10 scale): 4/10  Any medication changes, allergies to medications, adverse drug reactions, diagnosis change, or new procedure performed?: [x] No    [] Yes (see summary sheet for update)  Subjective functional status/changes:   [] No changes reported  Reports increased pain today potentially d/t wearing heels, but reports decreased swelling d/t ability to wear different shoes     OBJECTIVE    Modality rationale: decrease inflammation and decrease pain to improve the patients ability to perform ADLs   Min Type Additional Details    [] Estim:  []Unatt       []IFC  []Premod                        []Other:  []w/ice   []w/heat  Position:  Location:    [] Estim: []Att    []TENS instruct  []NMES                    []Other:  []w/US   []w/ice   []w/heat  Position:  Location:    []  Traction: [] Cervical       []Lumbar                       [] Prone          []Supine                       []Intermittent   []Continuous Lbs:  [] before manual  [] after manual    []  Ultrasound: []Continuous   [] Pulsed                           []1MHz   []3MHz W/cm2:  Location:    []  Iontophoresis with dexamethasone         Location: [] Take home patch   [] In clinic   8 [x]  Ice     []  heat  []  Ice massage  []  Laser   []  Anodyne Position: long sitting   Location: Left knee     []  Laser with stim  []  Other:  Position:  Location:    []  Vasopneumatic Device    []  Right     []  Left  Pre-treatment girth:  Post-treatment girth:  Measured at (location):  Pressure:       [] lo [] med [] hi   Temperature: [] lo [] med [] hi [x] Skin assessment post-treatment:  [x]intact []redness- no adverse reaction    []redness - adverse reaction:     40 min Therapeutic Exercise:  [] See flow sheet :   Rationale: increase ROM and increase strength to improve the patients ability to perform ADLs    10 min Manual Therapy:  Effleurage left ankle, shin, and knee    The manual therapy interventions were performed at a separate and distinct time from the therapeutic activities interventions. Rationale: decrease pain, increase ROM, increase tissue extensibility and decrease edema  to perform ADLs        With   [] TE   [] TA   [] neuro [] other: Patient Education: [x] Review HEP    [] Progressed/Changed HEP based on:   [] positioning   [] body mechanics   [] transfers   [] heat/ice application    [] other:      Other Objective/Functional Measures:   - Increased step-ups to 3x10   - Increased SLR to 3x10      Pain Level (0-10 scale) post treatment: 1/10    ASSESSMENT/Changes in Function: Patient actively participates in therapy and tolerates progressions well with no c/o increased pain       Patient will continue to benefit from skilled PT services to modify and progress therapeutic interventions, address functional mobility deficits, address ROM deficits, address strength deficits, analyze and address soft tissue restrictions and analyze and cue movement patterns to attain remaining goals. []  See Plan of Care  []  See progress note/recertification  []  See Discharge Summary         Progress towards goals / Updated goals:  Progress towards goals / Updated goals:  1. Pt will ambulate 1/2 mile on TM with a good pace and little to no difficulty for carryover to walking 1 mile or more for exercise with little difficulty                Progress Note Status:  Progressing, 150' no pain              Current Status:  2.  Pt will demo Left AROM knee flexion 120*and extension 10* or better without c/o discomfort to increase tolerance.              Progress Note Status: AROM left knee flexion 115* and 13* extension with c/o discomfort              Current Status:   3. Patient will report decreased discomfort throughout the night to improve sleep.              Progress Note Status: Patient reports waking up 3-4x per night d/t discomfort in the left knee              DMHDNKK Status:     PLAN  [x]  Upgrade activities as tolerated     [x]  Continue plan of care  []  Update interventions per flow sheet       []  Discharge due to:_  []  Other:_      Yi Chavez PTA 3/28/2022  3:50 PM    Future Appointments   Date Time Provider Asael Solano   3/30/2022  2:45 PM Skylar Jane, PTA [facility]   4/1/2022  2:45 PM Raiford Jane, PTA [facility]   4/4/2022  2:45 PM Skylar Jane, PTA [facility]   4/6/2022  2:45 PM Skylar Jane, PTA [facility]   4/8/2022  2:45 PM Skylar Jane, PTA [facility]   4/18/2022  2:45 PM Skylar Jane, PTA [facility]   4/20/2022  2:45 PM Raiford Jane, PTA [facility]   7/12/2022  8:05 AM [facility] LAB VISIT IO BS AMB   7/19/2022  9:40 AM Cristhian Aguilar MD [facility] AMB

## 2022-03-30 ENCOUNTER — HOSPITAL ENCOUNTER (OUTPATIENT)
Dept: PHYSICAL THERAPY | Age: 62
Discharge: HOME | End: 2022-03-30
Payer: COMMERCIAL

## 2022-03-30 PROCEDURE — 97140 MANUAL THERAPY 1/> REGIONS: CPT

## 2022-03-30 PROCEDURE — 97110 THERAPEUTIC EXERCISES: CPT

## 2022-03-30 NOTE — PROGRESS NOTES
PT DAILY TREATMENT NOTE     Patient Name: Jennie Lion  Date:3/30/2022  : 1960  [x]  Patient  Verified  Payor: Luis Miguel Covington / Plan: Elise Donaldson / Product Type: HMO /    In time:240  Out time:335  Total Treatment Time (min): 55  Visit #: 5 of 10    Medicare/BCBS Only   Total Timed Codes (min):  45 1:1 Treatment Time:  45       Treatment Area: Pain in left knee [M25.562]    SUBJECTIVE  Pain Level (0-10 scale): 0/10  Any medication changes, allergies to medications, adverse drug reactions, diagnosis change, or new procedure performed?: [x] No    [] Yes (see summary sheet for update)  Subjective functional status/changes:   [] No changes reported  No c/o pain today     OBJECTIVE    Modality rationale: decrease inflammation and decrease pain to improve the patients ability to perform ADLs   Min Type Additional Details    [] Estim:  []Unatt       []IFC  []Premod                        []Other:  []w/ice   []w/heat  Position:  Location:    [] Estim: []Att    []TENS instruct  []NMES                    []Other:  []w/US   []w/ice   []w/heat  Position:  Location:    []  Traction: [] Cervical       []Lumbar                       [] Prone          []Supine                       []Intermittent   []Continuous Lbs:  [] before manual  [] after manual    []  Ultrasound: []Continuous   [] Pulsed                           []1MHz   []3MHz W/cm2:  Location:    []  Iontophoresis with dexamethasone         Location: [] Take home patch   [] In clinic   10 [x]  Ice     []  heat  []  Ice massage  []  Laser   []  Anodyne Position: Long sitting   Location: Left knee     []  Laser with stim  []  Other:  Position:  Location:    []  Vasopneumatic Device    []  Right     []  Left  Pre-treatment girth:  Post-treatment girth:  Measured at (location):  Pressure:       [] lo [] med [] hi   Temperature: [] lo [] med [] hi   [x] Skin assessment post-treatment:  [x]intact []redness- no adverse reaction    []redness - adverse reaction:     35 min Therapeutic Exercise:  [] See flow sheet :   Rationale: increase ROM and increase strength to improve the patients ability to perform ADLs    10 min Manual Therapy:  Effleurage    The manual therapy interventions were performed at a separate and distinct time from the therapeutic activities interventions. Rationale: decrease pain, increase ROM, increase tissue extensibility and decrease edema  to perform ADLs           With   [x] TE   [] TA   [] neuro   [] other: Patient Education: [x] Review HEP    [] Progressed/Changed HEP based on:   [] positioning   [] body mechanics   [] transfers   [] heat/ice application    [] other:      Other Objective/Functional Measures:   - Amb (I) 350' no pain   - MT to decrease edema      Pain Level (0-10 scale) post treatment: 0/10    ASSESSMENT/Changes in Function: Patient actively participates in therapy and tolerates progressions well with no c/o increased pain       Patient will continue to benefit from skilled PT services to modify and progress therapeutic interventions, address functional mobility deficits, address ROM deficits, address strength deficits, analyze and address soft tissue restrictions and analyze and cue movement patterns to attain remaining goals. []  See Plan of Care  []  See progress note/recertification  []  See Discharge Summary           Progress towards goals / Updated goals:  1. Pt will ambulate 1/2 mile on TM with a good pace and little to no difficulty for carryover to walking 1 mile or more for exercise with little difficulty                Progress Note Status:  Progressing, 150' no pain              Current Status: Progressing 350' no pain   2.  Pt will demo Left AROM knee flexion 120*and extension 10* or better without c/o discomfort to increase tolerance.              Progress Note Status: AROM left knee flexion 115* and 13*  extension with c/o discomfort              Current Status:   3. Patient will report decreased discomfort throughout the night to improve sleep.              Progress Note Status: Patient reports waking up 3-4x per  night d/t discomfort in the left knee              ATKVQQP Status:     PLAN  [x]  Upgrade activities as tolerated     [x]  Continue plan of care  []  Update interventions per flow sheet       []  Discharge due to:_  []  Other:_      Walt Cornea, PTA 3/30/2022  3:05 PM    Future Appointments   Date Time Provider Asael Solano   4/1/2022  2:45 PM Stephy Jimenez, NATA [facility]   4/4/2022  2:45 PM Stephy Jimenez, PTA [facility]   4/6/2022  2:45 PM Stephy Jimenez, PTA [facility]   4/8/2022  2:45 PM Stephy Jimenez, PTA [facility]   4/18/2022  2:45 PM Stephy Jimenez, PTA [facility]   4/20/2022  2:45 PM Stephy Jimenez, PTA [facility]   7/12/2022  8:05 AM [facility] LAB VISIT IO BS AMB   7/19/2022  9:40 AM Ana Rudolph MD [facility] BS AMB

## 2022-04-01 ENCOUNTER — HOSPITAL ENCOUNTER (OUTPATIENT)
Dept: PHYSICAL THERAPY | Age: 62
Discharge: HOME | End: 2022-04-01
Payer: COMMERCIAL

## 2022-04-01 PROCEDURE — 97110 THERAPEUTIC EXERCISES: CPT

## 2022-04-01 PROCEDURE — 97140 MANUAL THERAPY 1/> REGIONS: CPT

## 2022-04-01 PROCEDURE — 97530 THERAPEUTIC ACTIVITIES: CPT

## 2022-04-01 NOTE — PROGRESS NOTES
PT DAILY TREATMENT NOTE     Patient Name: Raeann Spears  Date:2022  : 1960  [x]  Patient  Verified  Payor: Sindhu Galo / Plan: Kim Gallardoer / Product Type: HMO /    In time:256  Out time:334  Total Treatment Time (min): 38  Visit #: 5 of 10    Medicare/BCBS Only   Total Timed Codes (min):  38 1:1 Treatment Time:  38       Treatment Area: Pain in left knee [M25.562]    SUBJECTIVE  Pain Level (0-10 scale): 0/10  Any medication changes, allergies to medications, adverse drug reactions, diagnosis change, or new procedure performed?: [x] No    [] Yes (see summary sheet for update)  Subjective functional status/changes:   [] No changes reported  No c/o pain today, (B) LE swelling present with c/o tightness   OBJECTIVE    20 min Therapeutic Exercise:  [x] See flow sheet :   Rationale: increase ROM and increase strength to improve the patients ability to perform ADLs    8 min Therapeutic Activity:  [x]  See flow sheet :   Rationale: increase ROM, increase strength, improve coordination and improve balance  to improve the patients ability to perform ADLs    10 min Manual Therapy:  Effleurage left LE    The manual therapy interventions were performed at a separate and distinct time from the therapeutic activities interventions. Rationale: decrease pain, increase ROM, increase tissue extensibility and decrease edema  to perform ADLs           With   [x] TE   [] TA   [] neuro   [] other: Patient Education: [x] Review HEP    [] Progressed/Changed HEP based on:   [] positioning   [] body mechanics   [] transfers   [] heat/ice application    [] other:      Other Objective/Functional Measures:   - MT to decrease swelling in the left LE   - Decreased SLR to 2x10 today d/t left hip pain with this movement     Pain Level (0-10 scale) post treatment: 0/10    ASSESSMENT/Changes in Function: Patient actively participates in therapy and tolerates progressions well with no c/o increased pain.  Continues to present with swelling in the left LE and reporting tightness with no pain. Shortened treatment time d/t time constraint, patient late d/t traffic. Patient will continue to benefit from skilled PT services to modify and progress therapeutic interventions, address functional mobility deficits, address ROM deficits, address strength deficits, analyze and address soft tissue restrictions and analyze and cue movement patterns to attain remaining goals. []  See Plan of Care  []  See progress note/recertification  []  See Discharge Summary         Progress towards goals / Updated goals:  1. Pt will ambulate 1/2 mile on TM with a good pace and little to no difficulty for carryover to walking 1 mile or more for exercise with little difficulty                Progress Note Status:  Progressing, 150' no pain              Current Status: Progressing 350' no pain   2.  Pt will demo Left AROM knee flexion 120*and extension 10* or better without c/o discomfort to increase tolerance.              Progress Note Status: AROM left knee flexion 115* and 13*        extension with c/o discomfort              Current Status:   3. Patient will report decreased discomfort throughout the night to improve sleep.              Progress Note Status: Patient reports waking up 3-4x per             night d/t discomfort in the left knee              ZHXVITD Status:     PLAN  [x]  Upgrade activities as tolerated     [x]  Continue plan of care  []  Update interventions per flow sheet       []  Discharge due to:_  []  Other:_      Bernadine Valdez PTA 4/1/2022  3:06 PM    Future Appointments   Date Time Provider Asael Solano   4/4/2022  2:45 PM Lexi Fuller PTA [facility]   4/6/2022  2:45 PM Lexi Fuller PTA [facility]   4/8/2022  2:45 PM Lexi Fuller PTA [facility]   4/18/2022  2:45 PM Lexi Fuller PTA [facility]   4/20/2022  2:45 PM Lexi Fuller PTA [facility]   7/12/2022  8:05 AM [facility] LAB VISIT [facility] JAKE AMB 7/19/2022  9:40 AM Shanelle Prabhakar MD [facility] BS AMB

## 2022-04-04 ENCOUNTER — APPOINTMENT (OUTPATIENT)
Dept: PHYSICAL THERAPY | Age: 62
End: 2022-04-04
Payer: COMMERCIAL

## 2022-04-06 ENCOUNTER — HOSPITAL ENCOUNTER (OUTPATIENT)
Dept: PHYSICAL THERAPY | Age: 62
Discharge: HOME | End: 2022-04-06
Payer: COMMERCIAL

## 2022-04-06 PROCEDURE — 97530 THERAPEUTIC ACTIVITIES: CPT

## 2022-04-06 PROCEDURE — 97110 THERAPEUTIC EXERCISES: CPT

## 2022-04-06 NOTE — PROGRESS NOTES
PT DAILY TREATMENT NOTE     Patient Name: Judah Milliken  Date:2022  : 1960  [x]  Patient  Verified  Payor: Travis Britton / Plan: Arthur Davison / Product Type: HMO /    In time:2:50  Out time:3:39  Total Treatment Time (min): 49  Visit #: 6 of 10    Medicare/BCBS Only   Total Timed Codes (min):  39 1:1 Treatment Time:  39       Treatment Area: Pain in left knee [M25.562]    SUBJECTIVE  Pain Level (0-10 scale): 0  Any medication changes, allergies to medications, adverse drug reactions, diagnosis change, or new procedure performed?: [x] No    [] Yes (see summary sheet for update)  Subjective functional status/changes:   [] No changes reported  Pt reports 100% improvement than she was a month ago    OBJECTIVE    Modality rationale: decrease inflammation and decrease pain to improve the patients ability to tolerate normal activity   Min Type Additional Details    [] Estim:  []Unatt       []IFC  []Premod                        []Other:  []w/ice   []w/heat  Position:  Location:    [] Estim: []Att    []TENS instruct  []NMES                    []Other:  []w/US   []w/ice   []w/heat  Position:  Location:    []  Traction: [] Cervical       []Lumbar                       [] Prone          []Supine                       []Intermittent   []Continuous Lbs:  [] before manual  [] after manual    []  Ultrasound: []Continuous   [] Pulsed                           []1MHz   []3MHz W/cm2:  Location:    []  Iontophoresis with dexamethasone         Location: [] Take home patch   [] In clinic   10 [x]  Ice     []  heat  []  Ice massage  []  Laser   []  Anodyne Position: Long Sitting    Location: Left Knee    []  Laser with stim  []  Other:  Position:  Location:    []  Vasopneumatic Device    []  Right     []  Left  Pre-treatment girth:  Post-treatment girth:  Measured at (location):  Pressure:       [] lo [] med [] hi   Temperature: [] lo [] med [] hi   [x] Skin assessment post-treatment:  [x]intact []redness- no adverse reaction    []redness - adverse reaction:     16 min Therapeutic Exercise:  [x] See flow sheet :   Rationale: increase ROM, increase strength and improve coordination to improve the patients ability to tolerate increased activity    23 min Therapeutic Activity:  [x]  See flow sheet :   Rationale: increase ROM, increase strength and improve coordination  to improve the patients ability to improve daily function           With   [x] TE   [x] TA   [] neuro   [] other: Patient Education: [x] Review HEP    [] Progressed/Changed HEP based on:   [] positioning   [] body mechanics   [] transfers   [] heat/ice application    [] other:      Other Objective/Functional Measures:   - Knee Flx 122*  - Minor to no antalgic gait  - Initiate Monster squats, staggered squats, squats, table squats     Pain Level (0-10 scale) post treatment: 0    ASSESSMENT/Changes in Function:   - Good tolerance with added exercises per flow sheet    Patient will continue to benefit from skilled PT services to modify and progress therapeutic interventions, address functional mobility deficits, address ROM deficits, address strength deficits, analyze and address soft tissue restrictions and analyze and cue movement patterns to attain remaining goals. []  See Plan of Care  []  See progress note/recertification  []  See Discharge Summary         Progress towards goals / Updated goals:  1. Pt will ambulate 1/2 mile on TM with a good pace and little to no difficulty for carryover to walking 1 mile or more for exercise with little difficulty                Progress Note Status:  Progressing, 150' no pain              Current Status: Progressing 500' no pain 4/6/22   2.  Pt will demo Left AROM knee flexion 120*and extension 10* or better without c/o discomfort to increase tolerance.              Progress Note Status: AROM left knee flexion 115* and 13*        extension with c/o discomfort              Current Status: Met at 122* Flx and 8* Ext upon arrival 4/6/22  3.  Patient will report decreased discomfort throughout the night to improve sleep.              Progress Note Status: Patient reports waking up 3-4x per             OXNEK d/t discomfort in the left knee              Current Status: Not missing sleep due to the left knee 4/6/22     PLAN  [x]  Upgrade activities as tolerated     [x]  Continue plan of care  []  Update interventions per flow sheet       []  Discharge due to:_  []  Other:_      Raad Jaime, PT 4/6/2022  2:52 PM    Future Appointments   Date Time Provider Asael Solano   4/8/2022  2:45 PM [facility]   4/18/2022  2:45 PM [facility]   4/20/2022  2:45 PM [facility], PTA [facility]   7/12/2022  8:05 AM [facility] LAB VISIT [facility] BS AMB   7/19/2022  9:40 AM Deo Crain MD [facility] BS AMB

## 2022-04-08 ENCOUNTER — HOSPITAL ENCOUNTER (OUTPATIENT)
Dept: PHYSICAL THERAPY | Age: 62
Discharge: HOME | End: 2022-04-08
Payer: COMMERCIAL

## 2022-04-08 PROCEDURE — 97530 THERAPEUTIC ACTIVITIES: CPT

## 2022-04-08 PROCEDURE — 97110 THERAPEUTIC EXERCISES: CPT

## 2022-04-08 NOTE — PROGRESS NOTES
PT DAILY TREATMENT NOTE     Patient Name: Thomas Krueger  Date:2022  : 1960  [x]  Patient  Verified  Payor: Heidi Vitale / Plan: Jacki Blanchard / Product Type: HMO /    In time:2:03  Out time: 2:56  Total Treatment Time (min): 53  Visit #: 7 of 10    Medicare/BCBS Only   Total Timed Codes (min):  43 1:1 Treatment Time:  43       Treatment Area: Pain in left knee [M25.562]    SUBJECTIVE  Pain Level (0-10 scale): 1  Any medication changes, allergies to medications, adverse drug reactions, diagnosis change, or new procedure performed?: [x] No    [] Yes (see summary sheet for update)  Subjective functional status/changes:   [] No changes reported  Minor tightness and pain upon arrival today    OBJECTIVE    Modality rationale: decrease inflammation and decrease pain to improve the patients ability to tolerate normal activity   Min Type Additional Details    [] Estim:  []Unatt       []IFC  []Premod                        []Other:  []w/ice   []w/heat  Position:  Location:    [] Estim: []Att    []TENS instruct  []NMES                    []Other:  []w/US   []w/ice   []w/heat  Position:  Location:    []  Traction: [] Cervical       []Lumbar                       [] Prone          []Supine                       []Intermittent   []Continuous Lbs:  [] before manual  [] after manual    []  Ultrasound: []Continuous   [] Pulsed                           []1MHz   []3MHz W/cm2:  Location:    []  Iontophoresis with dexamethasone         Location: [] Take home patch   [] In clinic   10 [x]  Ice     []  heat  []  Ice massage  []  Laser   []  Anodyne Position: Long Sitting    Location: Left Knee    []  Laser with stim  []  Other:  Position:  Location:    []  Vasopneumatic Device    []  Right     []  Left  Pre-treatment girth:  Post-treatment girth:  Measured at (location):  Pressure:       [] lo [] med [] hi   Temperature: [] lo [] med [] hi   [x] Skin assessment post-treatment:  [x]intact []redness- no adverse reaction    []redness - adverse reaction:     12 min Therapeutic Exercise:  [x] See flow sheet :   Rationale: increase ROM, increase strength and improve coordination to improve the patients ability to tolerate increased activity    31 min Therapeutic Activity:  [x]  See flow sheet :   Rationale: increase ROM, increase strength and improve coordination  to improve the patients ability to improve daily function           With   [x] TE   [x] TA   [] neuro   [] other: Patient Education: [x] Review HEP    [] Progressed/Changed HEP based on:   [] positioning   [] body mechanics   [] transfers   [] heat/ice application    [] other:      Other Objective/Functional Measures:   - Add TM with good tolerance     Pain Level (0-10 scale) post treatment: 0    ASSESSMENT/Changes in Function:   - Good tolerance with added exercises per flow sheet  - Pt tolerated TM for 1/4 mile in 5' 36\" with minor left knee tightness  - Good response to treatment with decreased discomfort after treatment    Patient will continue to benefit from skilled PT services to modify and progress therapeutic interventions, address functional mobility deficits, address ROM deficits, address strength deficits, analyze and address soft tissue restrictions and analyze and cue movement patterns to attain remaining goals. []  See Plan of Care  []  See progress note/recertification  []  See Discharge Summary         Progress towards goals / Updated goals:  1. Pt will ambulate 1/2 mile on TM with a good pace and little to no difficulty for carryover to walking 1 mile or more for exercise with little difficulty                Progress Note Status:  Progressing, 150' no pain              Current Status: Progressing 500' no pain 4/6/22   2.  Pt will demo Left AROM knee flexion 120*and extension 10* or better without c/o discomfort to increase tolerance.              Progress Note Status: AROM left knee flexion 115* and 13*        extension with c/o discomfort              Current Status: Met at 122* Flx and 8* Ext upon arrival 4/6/22  3.  Patient will report decreased discomfort throughout the night to improve sleep.              Progress Note Status: Patient reports waking up 3-4x per             CUGVB d/t discomfort in the left knee              Current Status: Met, Not missing sleep due to the left knee 4/6/22     PLAN  [x]  Upgrade activities as tolerated     [x]  Continue plan of care  []  Update interventions per flow sheet       []  Discharge due to:_  []  Other:_      Ayleen Benavides, PT 4/8/2022  2:02 PM    Future Appointments   Date Time Provider Asael Solano   4/18/2022  2:45 PM Yuki Castro PTA [facility]   4/20/2022  2:45 PM Yuki Castro PTA [facility]   7/12/2022  8:05 AM [facility] LAB VISIT [facility] AM[facility]   7/19/2022  9:40 AM Corey Trammell MD [facility]

## 2022-04-20 ENCOUNTER — APPOINTMENT (OUTPATIENT)
Dept: PHYSICAL THERAPY | Age: 62
End: 2022-04-20
Payer: COMMERCIAL

## 2022-05-05 NOTE — PROGRESS NOTES
[facility] at [address] [facility], [address] [place] [address]  Phone: [phone]      Fax:  [phone]    Discharge Summary    Patient name: Amada Linares Start of Care: 2022   Referral Nicolasa Basurto MD : 1960               Medical Diagnosis: Pain in left knee [M25.562]  Payor: Sindhu Galo / Plan: Kim Roberts / Product Type: HMO /  Onset Date:22               Treatment Diagnosis: Left Knee Pain   Prior Hospitalization: see medical history Provider#: [id number]   Medications: Verified on Patient summary List   Comorbidities: OA, Thyroid Problems, HTN, Visual Impaired  Prior Level of Function: able to do usual workout routine at least 3 times a week                             Visits from Start of Care: 14    Missed Visits: 2    Reporting Period : 22 to 22      1. Pt will ambulate 1/2 mile on TM with a good pace and little to no difficulty for carryover to walking 1 mile or more for exercise with little difficulty                Progress Note Status:  Progressing, 150' no pain              Current Status: Progressing 500' no pain   2. Pt will demo Left AROM knee flexion 120*and extension 10* or better without c/o discomfort to increase tolerance.              Progress Note Status: AROM left knee flexion 115* and 13*        extension with c/o discomfort              Current Status: Met at 122* Flx and 8* Ext upon arrival   3. Patient will report decreased discomfort throughout the night to improve sleep.              Progress Note Status: Patient reports waking up 3-4x per             night d/t discomfort in the left knee              Current Status: Met, Not missing sleep due to the left knee     Assessment/ Summary of Care: Patient has shown good progress with this treatment program. Pain as of last visit was 1/10. Patient has shown decreased pain and increased strength and mobility.   Patient reports 50% improvement with overall involvement. FOTO score is 64. All STG/LTGs achieved as identified above. Patient has declined to continue with therapy and will be discharged at this time.        RECOMMENDATIONS:  [x]Discontinue therapy: []Patient has reached or is progressing toward set goals      [x]Patient is non-compliant or has abdicated      []Due to lack of appreciable progress towards set [address] [facility], [facility] 5/5/2022 10:36 AM  Tracie Cevallos., MPT

## 2022-05-27 ENCOUNTER — PATIENT OUTREACH (OUTPATIENT)
Dept: CASE MANAGEMENT | Age: 62
End: 2022-05-27

## 2022-05-27 NOTE — PROGRESS NOTES
[facility]    CTN unable to reach patient for Transition of care follow up. It has been 30 days or more since patient's most recent noted  hospital discharge of 2-24-22. Episode resolved.

## 2022-07-06 DIAGNOSIS — M17.9 OSTEOARTHRITIS OF KNEE, UNSPECIFIED LATERALITY, UNSPECIFIED OSTEOARTHRITIS TYPE: ICD-10-CM

## 2022-07-06 RX ORDER — CELECOXIB 200 MG/1
CAPSULE ORAL
Qty: 90 CAPSULE | Refills: 1 | Status: SHIPPED | OUTPATIENT
Start: 2022-07-06

## 2022-07-12 ENCOUNTER — APPOINTMENT (OUTPATIENT)
Dept: INTERNAL MEDICINE CLINIC | Age: 62
End: 2022-07-12

## 2022-07-12 DIAGNOSIS — Z00.00 PHYSICAL EXAM: ICD-10-CM

## 2022-07-13 PROBLEM — E05.00 TOXIC DIFFUSE GOITER WITHOUT CRISIS: Status: RESOLVED | Noted: 2018-10-30 | Resolved: 2022-07-13

## 2022-07-13 LAB
ALBUMIN SERPL-MCNC: 4.3 G/DL (ref 3.8–4.8)
ALBUMIN/GLOB SERPL: 2.3 {RATIO} (ref 1.2–2.2)
ALP SERPL-CCNC: 133 IU/L (ref 44–121)
ALT SERPL-CCNC: 20 IU/L (ref 0–32)
AST SERPL-CCNC: 17 IU/L (ref 0–40)
BILIRUB SERPL-MCNC: 0.3 MG/DL (ref 0–1.2)
BUN SERPL-MCNC: 21 MG/DL (ref 8–27)
BUN/CREAT SERPL: 29 (ref 12–28)
CALCIUM SERPL-MCNC: 9.2 MG/DL (ref 8.7–10.3)
CHLORIDE SERPL-SCNC: 103 MMOL/L (ref 96–106)
CHOLEST SERPL-MCNC: 212 MG/DL (ref 100–199)
CO2 SERPL-SCNC: 26 MMOL/L (ref 20–29)
CREAT SERPL-MCNC: 0.73 MG/DL (ref 0.57–1)
EGFR: 93 ML/MIN/1.73
ERYTHROCYTE [DISTWIDTH] IN BLOOD BY AUTOMATED COUNT: 13.3 % (ref 11.7–15.4)
GLOBULIN SER CALC-MCNC: 1.9 G/DL (ref 1.5–4.5)
GLUCOSE SERPL-MCNC: 99 MG/DL (ref 65–99)
HCT VFR BLD AUTO: 42.2 % (ref 34–46.6)
HDLC SERPL-MCNC: 70 MG/DL
HGB BLD-MCNC: 13.5 G/DL (ref 11.1–15.9)
IMP & REVIEW OF LAB RESULTS: NORMAL
LDLC SERPL CALC-MCNC: 130 MG/DL (ref 0–99)
MCH RBC QN AUTO: 29.2 PG (ref 26.6–33)
MCHC RBC AUTO-ENTMCNC: 32 G/DL (ref 31.5–35.7)
MCV RBC AUTO: 91 FL (ref 79–97)
PLATELET # BLD AUTO: 261 X10E3/UL (ref 150–450)
POTASSIUM SERPL-SCNC: 4.5 MMOL/L (ref 3.5–5.2)
PROT SERPL-MCNC: 6.2 G/DL (ref 6–8.5)
RBC # BLD AUTO: 4.63 X10E6/UL (ref 3.77–5.28)
SODIUM SERPL-SCNC: 141 MMOL/L (ref 134–144)
TRIGL SERPL-MCNC: 66 MG/DL (ref 0–149)
VLDLC SERPL CALC-MCNC: 12 MG/DL (ref 5–40)
WBC # BLD AUTO: 4.4 X10E3/UL (ref 3.4–10.8)

## 2022-07-14 NOTE — PROGRESS NOTES
57 y.o. WHITE/NON- female who presents for evaluation    Very happy with the results of the knee surgery. She's back to golfing weekly, gardening 2x/week, dancing, etc.  No cardiovascular complaints    Denies polyuria, polydipsia, nocturia, vision change. Not checking sugars at this time. Weight continues to be an issue. She's doing IF with a 12 to 8 window and doing low carb and the weight is not moving. Admits that she could be overdoing the protein and fat, however. Eats pork rinds for snacks. She might be interested in trying qsymia again or even contrave    Vitals 7/19/2022 3/3/2022   Weight 192 lb 192 lb     Vitals 2/17/2022 1/17/2022 10/8/2021 7/19/2021   Weight 196 lb 3.2 oz 193 lb 185 lb 187 lb     Dr Zachary Wallace weaned off the Tapazole and continues to follow her every 3-6 months or so. No sx referable to the thyroid,  We had referred her to him after then initial ct below showed adrenal mass but follow up mri imaging confirms likely adenoma. She has appt in Oct and will bring it up then    The Sjogren's is being tx'ed w evoxac, not seeing Dr Daiana Gaines at this time    No GI or  complaints.   She had follow up w Dr Bhupendra Crandall after the pancreatitis, mri showed IPMN as below    Requesting refill of the ambien as her personal concoction of cbd/benadryl/melatonin is not working    Past Medical History:   Diagnosis Date    Adrenal adenoma, left 02/2022    on CT; >1cm on mri (4/22)    Agatston CAC score, <100 07/2019    ca score 51; mild plaque LAD and RCA    Allergic rhinitis Dr. Lea Montoya 2011     Colon adenoma 08/2016    Dr Dylan Crandall 2007, 2011    COVID-19 virus infection 01/2021    FHx: heart disease     Gastritis 07/2017    h pylori+ Dr Luzmaria Gatica GERD (gastroesophageal reflux disease) 2007    [facility]    Hyperlipidemia     calculated risk score 1.9% (2/13); elected to take statins due to Fhx, elev hscrp/ldl-p but intol of 4 diff ones    Hypertension     Hyperthyroidism 2018    Dr Eulalio Zamudio; tapazole    IFG (impaired fasting glucose)     IPMN (intraductal papillary mucinous neoplasm) 2022    on CT; 6mm on mri ()    Obesity (BMI 30.0-34.9)     IF  start weight 183 lbs but ineffective    Osteoarthritis of both knees     Dr. Maggy Fernandes Overweight (BMI 25.0-29.9)     saw Dr Stephanie Mark; qsymia ineffective -6/15    Pancreatitis 2022    postop; saw Dr Yonatan Uribe Sjogren's disease, probable Dr. Sarath Ji      Past Surgical History:   Procedure Laterality Date    HX APPENDECTOMY      HX BREAST BIOPSY Right     HX CHOLECYSTECTOMY      [address] , Dr. Bill Esqueda ,  adenoma    HX KNEE REPLACEMENT Bilateral     Dr Ezekiel King (); Dr Ryanne Cruz ()    HX TUBAL LIGATION       Social History     Socioeconomic History    Marital status: SINGLE     Spouse name: Not on file    Number of children: 1    Years of education: Not on file    Highest education level: Not on file   Occupational History    Occupation: [employer]   Tobacco Use    Smoking status: Former Smoker     Packs/day: 0.50     Years: 15.00     Pack years: 7.50     Quit date: 2000     Years since quittin.5    Smokeless tobacco: Never Used   Vaping Use    Vaping Use: Never used   Substance and Sexual Activity    Alcohol use: Yes     Alcohol/week: 1.0 standard drink     Types: 1 Glasses of wine per week    Drug use: No    Sexual activity: Not Currently   Other Topics Concern    Not on file   Social History Narrative    Not on file     Social Determinants of Health     Financial Resource Strain:     Difficulty of Paying Living Expenses: Not on file   Food Insecurity:     Worried About Running Out of Food in the Last Year: Not on file    Ramiro of Food in the Last Year: Not on file   Transportation Needs:     Lack of Transportation (Medical): Not on file    Lack of Transportation (Non-Medical):  Not on file Physical Activity:     Days of Exercise per Week: Not on file    Minutes of Exercise per Session: Not on file   Stress:     Feeling of Stress : Not on file   Social Connections:     Frequency of Communication with Friends and Family: Not on file    Frequency of Social Gatherings with Friends and Family: Not on file    Attends Denominational Services: Not on file    Active Member of [address] [place] [address] or Organizations: Not on file    Attends Club or Organization Meetings: Not on file    Marital Status: Not on file   Intimate Partner Violence:     Fear of Current or Ex-Partner: Not on file    Emotionally Abused: Not on file    Physically Abused: Not on file    Sexually Abused: Not on file   Housing Stability:     Unable to Pay for Housing in the Last Year: Not on file    Number of [place] in the Last Year: Not on file    Unstable Housing in the Last Year: Not on file     Family History   Problem Relation Age of Onset    Hypertension Mother     Heart Disease Father     Cancer Paternal Grandmother         ovarian    Cancer Paternal Aunt     Ovarian Cancer Maternal Grandmother      Immunization History   Administered Date(s) Administered    COVID-19, MODERNA BLUE border, Primary or Immunocompromised, (age 18y+), IM, 100 mcg/0.5mL 07/01/2021, 08/01/2021    Influenza Vaccine 10/10/2020    Influenza Vaccine (Mdck Quadrivalent)(>2 Yrs Flucelvax Quad vial [id number]) 10/05/2021    Influenza Vaccine Fio) PF (>6 Mo Flulaval, Fluarix, and >3 Yrs Afluria, Fluzone [id number]) 12/12/2016, 03/13/2018    Influenza Vaccine PF 12/20/2013    TD Vaccine 02/01/2007     Current Outpatient Medications   Medication Sig    zolpidem (AMBIEN) 10 mg tablet Take 1 Tablet by mouth nightly as needed for Sleep. Max Daily Amount: 10 mg.  phentermine-topiramate (Qsymia) 7.5-46 mg CM24 Take 1 Tablet by mouth daily. Max Daily Amount: 1 Tablet.     celecoxib (CELEBREX) 200 mg capsule take 1 capsule once daily if needed for pain    amoxicillin (AMOXIL) 500 mg capsule Take 4 tabs 1 hour before procedure    pantoprazole (PROTONIX) 40 mg tablet take 1 tablet by mouth once daily    amLODIPine (NORVASC) 10 mg tablet take 1 tablet by mouth once daily    pitavastatin calcium (Livalo) 1 mg tab tablet take 1 tablet by mouth once daily    cevimeline (EVOXAC) 30 mg capsule take 1 capsule by mouth three times a day     No current facility-administered medications for this visit. Allergies   Allergen Reactions    Benazepril Cough    Chantix [Varenicline] Nausea and Vomiting    Codeine Nausea and Vomiting and Other (comments)     Abdominal Pain    Crestor [Rosuvastatin] Myalgia    Lipitor [Atorvastatin] Myalgia    Pravastatin Myalgia    Prevacid [Lansoprazole] Other (comments)     Constipation    Zocor [Simvastatin] Myalgia     REVIEW OF SYSTEMS:  gyn 2021 Dr. Ryan Hartmann, [address] [facility] 10/21, colo 8/16 Dr Melinda Marie  Ophtho - no vision change or eye pain  Oral - no mouth pain, tongue or tooth problems  Ears - no hearing loss, ear pain, fullness, no swallowing problems  Cardiac - no CP, PND, orthopnea, edema, palpitations or syncope  Chest - no breast masses  Resp - no wheezing, chronic coughing, dyspnea  Neuro - no focal weakness, numbness, paresthesias, incoordination, ataxia, involuntary movements  Endo - no polyuria, polydipsia, nocturia, hot flashes    Visit Vitals  /80   Pulse 74   Temp 97.5 °F (36.4 °C) (Temporal)   Resp 20   Ht 5' 5\" (1.651 m)   Wt 192 lb (87.1 kg)   SpO2 99%   BMI 31.95 kg/m²     Affect is appropriate. Mood stable  No apparent distress  HEENT --Anicteric sclerae. No thyromegaly, JVD, or bruits. Lungs --Clear to auscultation, normal percussion. Heart --Regular rate and rhythm, no murmurs, rubs, gallops, or clicks. Chest wall --Nontender to palpation. PMI normal.  Abdomen -- Soft and nontender, no hepatosplenomegaly or masses. Extremities -- Without cyanosis, clubbing, edema.  2+ pulses equally and bilaterally.     LABS  From 10/11 showed gluc 92,   cr 0.91, gfr 73, alt 16,         ldl-p 1793, chol 221, tg 99,   hdl 65, ldl-c 136, wbc 6.1, hb 14.5, plt 249, jim neg, vit d 37.1, esr 5  From 2/13 showed   gluc 95,   cr 0.82, gfr 82, alt <5                                   chol 220, tg 117, hdl 53, ldl-c 144  From 4/13 showed   gluc 85,   cr 0.70,            alt 15, hba1c 5.0, ldl-p 1372, chol 163, tg 68,    hdl 60, ldl-c 104, wbc 5.7, hb 13.8, plt 228, ua neg, hscrp 3.8, uric 2.8, tsh 0.86, vit d 36, ua neg  From 6/13 showed   gluc 111, cr 0.70, gfr 99, alt 12,     chol 160, tg 73,   hdl 47, ldl-c 98  From 12/13 showed gluc 92,   cr 0.74, gfr 92, alt 13, hba1c 5.6, ldl-p 1055, chol 158, tg 82,   hdl 54, ldl-c 88  From 6/14 showed          chol 162, tg 97,   hdl 55, ldl-c 88,   wbc 6.2, hb 13.5, plt 228, ua neg  From 6/15 showed   gluc 98,   cr 1.01, gfr 57, alt<5, hba1c 5.5,    chol 177, tg 128, hdl 53, ldl-c 98,   wbc 5.8, hb 14.3, plt 238, ua neg  From 8/16 showed   gluc 102, cr 0.90, gfr 72, alt 13,     chol 241, tg 98,   hdl 68, ldl-c 156, wbc 4.8, hb 14.3, plt 255, ua neg,   hep c-  From 12/16 showed       alt 14,     chol 215, tg 116, hdl 62, ldl-c 130  From 4/17 showed       alt 12,     chol 189, tg 127, hdl 59, ldl-c 105  From 4/17 showed   gluc 102, cr 0.97, gfr 79,                 wbc 5.6, hb 14.5, plt 291, tsh 1.84, ft4 1.29  From 2/18 showed   gluc 92,   cr 0.86, gfr>60,                 wbc 5.2, hb 12.7, plt 561  From 6/18 showed   gluc 89,   cr 0.71, gfr 94, alt 18,                chol 174, tg 152, hdl 42, ldl-c 102, wbc 3.2, hb 12.8, plt 240  From 7/18 showed                              tsh<0.01, ft4 1.96, TRAb+  From 7/19 showed   glu 108,   cr 0.87, gfr 73, alt 13,     chol 213, tg 74,   hdl 58, ldl-c 140, wbc 4.7, hb 14.3, plt 246, tsh 1.74  From 7/20 showed   gluc 87,   cr 0.81, gfr 79, alt 17,     chol 190, tg 142, hdl 64, ldl-c 98  From 7/21 showed   glu 104,   cr 0.87, gfr 72, alt 18, chol 178, tg 97,   hdl 71, ldl-c 90,   wbc 5.0, hb 13.5, plt 241, tsh 1.49, ft4 1.25  From 1/22 showed   gluc 94,   cr 0.77, gfr>60,                 wbc 6.8, hb 13.8, plt 317    Results for orders placed or performed in visit on 43/18/64   METABOLIC PANEL, COMPREHENSIVE   Result Value Ref Range    Glucose 99 65 - 99 mg/dL    BUN 21 8 - 27 mg/dL    Creatinine 0.73 0.57 - 1.00 mg/dL    eGFR 93 >59 mL/min/1.73    BUN/Creatinine ratio 29 (H) 12 - 28    Sodium 141 134 - 144 mmol/L    Potassium 4.5 3.5 - 5.2 mmol/L    Chloride 103 96 - 106 mmol/L    CO2 26 20 - 29 mmol/L    Calcium 9.2 8.7 - 10.3 mg/dL    Protein, total 6.2 6.0 - 8.5 g/dL    Albumin 4.3 3.8 - 4.8 g/dL    GLOBULIN, TOTAL 1.9 1.5 - 4.5 g/dL    A-G Ratio 2.3 (H) 1.2 - 2.2    Bilirubin, total 0.3 0.0 - 1.2 mg/dL    Alk. phosphatase 133 (H) 44 - 121 IU/L    AST (SGOT) 17 0 - 40 IU/L    ALT (SGPT) 20 0 - 32 IU/L   LIPID PANEL   Result Value Ref Range    Cholesterol, total 212 (H) 100 - 199 mg/dL    Triglyceride 66 0 - 149 mg/dL    HDL Cholesterol 70 >39 mg/dL    VLDL, calculated 12 5 - 40 mg/dL    LDL, calculated 130 (H) 0 - 99 mg/dL   CBC W/O DIFF   Result Value Ref Range    WBC 4.4 3.4 - 10.8 x10E3/uL    RBC 4.63 3.77 - 5.28 x10E6/uL    HGB 13.5 11.1 - 15.9 g/dL    HCT 42.2 34.0 - 46.6 %    MCV 91 79 - 97 fL    MCH 29.2 26.6 - 33.0 pg    MCHC 32.0 31.5 - 35.7 g/dL    RDW 13.3 11.7 - 15.4 %    PLATELET 873 110 - 961 x10E3/uL   CVD REPORT   Result Value Ref Range    INTERPRETATION Note      We reviewed the patient's labs from the last several visits to point out trends in the numbers        Patient Active Problem List   Diagnosis Code    Hyperlipidemia E78.5    Colon polyps Dr. Melinda Marie 2011, adenoma 8/16 K63.5    Sjogren's disease, probable Dr. Yvette Yen M35.00    Essential hypertension I10    Arthritis, degenerative knees Dr Paul Jones M19.90    Gastroesophageal reflux disease without esophagitis K21.9    Allergic rhinitis J30.9    Obesity (BMI 30.0-34. 9) E66.9    Hyperthyroidism E05.90    IFG (impaired fasting glucose) R73.01     ASSESSMENT AND PLAN:  1. Allergic rhinitis. Continue current  2. GERD. Continue ppi and avoidance measures  3. Colon polyps. F/U 2021, fiber  4. Hyperthyroidism. Follow-up Dr. Clinton Ellison  5. Hypertension. Controlled on current  6. Sjogren's. Continue current and f/u Dr. Josseline Hdz prn  7. IFG. Lifestyle and dietary measures. Portion control reiterated, wt loss would be ideal8. Arthritis. Prn celebrex; she has stopped the cymbalta    NEW ISSUES  9. IPMN. Yearly mri per protocol  10. Hyperlipidemia. Continue pitava 1mg and she willl do better with the diet shifting to mediterranean approach  11. Obesity. Lifestyle and dietary measures. She will narrow the eating window to 6 hrs, continue the exercise and retry the qsymia,  sfx reviewed. Not a candidate for glp with h/o pancreatitis at this point  12. Insomnia. Ambien refilled          RTC 1/23    Above conditions discussed at length and patient vocalized understanding. All questions answered to patient satisfaction        ICD-10-CM ICD-9-CM    1. Essential hypertension  I10 401.9    2. Insomnia, unspecified type  G47.00 780.52 zolpidem (AMBIEN) 10 mg tablet   3. Gastroesophageal reflux disease without esophagitis  K21.9 530.81    4. Adenomatous polyp of colon, unspecified part of colon  D12.6 211.3    5. IFG (impaired fasting glucose)  R73.01 790.21    6. Hyperthyroidism  E05.90 242.90    7. Sjogren's syndrome, with unspecified organ involvement ([facility] [address]  M35.00 710.2    8. Obesity (BMI 30.0-34. 9)  E66.9 278.00 phentermine-topiramate (Qsymia) 7.5-46 mg CM24      DISCONTINUED: phentermine-topiramate (Qsymia) 7.5-46 mg CM24   9.  Hyperlipidemia, unspecified hyperlipidemia type  E78.5 272.4

## 2022-07-19 ENCOUNTER — OFFICE VISIT (OUTPATIENT)
Dept: INTERNAL MEDICINE CLINIC | Age: 62
End: 2022-07-19
Payer: COMMERCIAL

## 2022-07-19 VITALS
SYSTOLIC BLOOD PRESSURE: 129 MMHG | BODY MASS INDEX: 31.99 KG/M2 | DIASTOLIC BLOOD PRESSURE: 80 MMHG | HEIGHT: 65 IN | TEMPERATURE: 97.5 F | HEART RATE: 74 BPM | WEIGHT: 192 LBS | OXYGEN SATURATION: 99 % | RESPIRATION RATE: 20 BRPM

## 2022-07-19 DIAGNOSIS — D12.6 ADENOMATOUS POLYP OF COLON, UNSPECIFIED PART OF COLON: ICD-10-CM

## 2022-07-19 DIAGNOSIS — E78.5 HYPERLIPIDEMIA, UNSPECIFIED HYPERLIPIDEMIA TYPE: ICD-10-CM

## 2022-07-19 DIAGNOSIS — I10 ESSENTIAL HYPERTENSION: Primary | ICD-10-CM

## 2022-07-19 DIAGNOSIS — R73.01 IFG (IMPAIRED FASTING GLUCOSE): ICD-10-CM

## 2022-07-19 DIAGNOSIS — E05.90 HYPERTHYROIDISM: ICD-10-CM

## 2022-07-19 DIAGNOSIS — E66.9 OBESITY (BMI 30.0-34.9): ICD-10-CM

## 2022-07-19 DIAGNOSIS — M35.00 SJOGREN'S SYNDROME, WITH UNSPECIFIED ORGAN INVOLVEMENT (HCC): ICD-10-CM

## 2022-07-19 DIAGNOSIS — G47.00 INSOMNIA, UNSPECIFIED TYPE: ICD-10-CM

## 2022-07-19 DIAGNOSIS — K21.9 GASTROESOPHAGEAL REFLUX DISEASE WITHOUT ESOPHAGITIS: ICD-10-CM

## 2022-07-19 PROBLEM — M17.9 OA (OSTEOARTHRITIS) OF KNEE: Status: RESOLVED | Noted: 2022-02-17 | Resolved: 2022-07-19

## 2022-07-19 PROBLEM — K85.90 PANCREATITIS: Status: RESOLVED | Noted: 2022-02-21 | Resolved: 2022-07-19

## 2022-07-19 LAB — HBA1C MFR BLD HPLC: 5.4 %

## 2022-07-19 PROCEDURE — 83036 HEMOGLOBIN GLYCOSYLATED A1C: CPT | Performed by: INTERNAL MEDICINE

## 2022-07-19 PROCEDURE — 99214 OFFICE O/P EST MOD 30 MIN: CPT | Performed by: INTERNAL MEDICINE

## 2022-07-19 RX ORDER — ZOLPIDEM TARTRATE 10 MG/1
10 TABLET ORAL
Qty: 30 TABLET | Refills: 2 | Status: SHIPPED | OUTPATIENT
Start: 2022-07-19

## 2022-07-19 RX ORDER — PHENTERMINE AND TOPIRAMATE 7.5; 46 MG/1; MG/1
1 CAPSULE, EXTENDED RELEASE ORAL DAILY
Qty: 30 CAPSULE | Refills: 5 | Status: SHIPPED | OUTPATIENT
Start: 2022-07-19

## 2022-07-19 RX ORDER — PHENTERMINE AND TOPIRAMATE 7.5; 46 MG/1; MG/1
1 CAPSULE, EXTENDED RELEASE ORAL DAILY
Qty: 30 CAPSULE | Refills: 5 | Status: SHIPPED | OUTPATIENT
Start: 2022-07-19 | End: 2022-07-19 | Stop reason: SDUPTHER

## 2022-07-19 NOTE — PROGRESS NOTES
Kassi Francis presents with   Chief Complaint   Patient presents with    Physical     yearly    Hypertension    Cholesterol Problem    Labs     7-12-22                1. \"Have you been to the ER, urgent care clinic since your last visit? Hospitalized since your last visit? \" No    2. \"Have you seen or consulted any other health care providers outside of the [address] [place] [address] since your last visit? \" No     3. For patients aged 39-70: Has the patient had a colonoscopy / FIT/ Cologuard? Yes - no Care Gap present      If the patient is female:    4. For patients aged 41-77: Has the patient had a mammogram within the past 2 years? Yes - no Care Gap present      5. For patients aged 21-65: Has the patient had a pap smear?  Yes - no Care Gap present

## 2022-07-25 ENCOUNTER — PATIENT MESSAGE (OUTPATIENT)
Dept: INTERNAL MEDICINE CLINIC | Age: 62
End: 2022-07-25

## 2022-08-01 ENCOUNTER — PATIENT MESSAGE (OUTPATIENT)
Dept: INTERNAL MEDICINE CLINIC | Age: 62
End: 2022-08-01

## 2022-08-02 ENCOUNTER — TELEPHONE (OUTPATIENT)
Dept: INTERNAL MEDICINE CLINIC | Age: 62
End: 2022-08-02

## 2022-08-02 DIAGNOSIS — N63.20 MASS OF LEFT BREAST, UNSPECIFIED QUADRANT: Primary | ICD-10-CM

## 2022-08-02 DIAGNOSIS — N63.20 MASS OF LEFT BREAST, UNSPECIFIED QUADRANT: ICD-10-CM

## 2022-08-02 NOTE — TELEPHONE ENCOUNTER
Regarding: Small knot in my breast  ----- Message from Neisha Wharton LPN sent at 4/1/3298 11:46 AM EDT -----       ----- Message from Nicole Carballo to Nabil Monteiro MD sent at 8/1/2022  8:25 PM -----   I have a knot  that has formed on the top center of my left breast.  Who do I need to see? It has been there for a while and was there when I had my last mammogram.   But - it has gotten bigger and now looks like a bruise. Thank you for your time.

## 2022-08-02 NOTE — TELEPHONE ENCOUNTER
----- Message from Pinky Cuello sent at 8/1/2022  8:25 PM EDT -----  Regarding: Small knot in my breast  I have a knot  that has formed on the top center of my left breast.  Who do I need to see? It has been there for a while and was there when I had my last mammogram.   But - it has gotten bigger and now looks like a bruise. Thank you for your time.

## 2022-08-02 NOTE — TELEPHONE ENCOUNTER
Patient reached and given information and number to call central scheduling, verbalized understanding.

## 2022-08-02 NOTE — TELEPHONE ENCOUNTER
Regarding: Small knot in my breast  ----- Message from Florecita Kat LPN sent at 0/7/1807 11:46 AM EDT -----       ----- Message from Stacey Baumann to Melissa Almonte MD sent at 8/1/2022  8:25 PM -----   I have a knot  that has formed on the top center of my left breast.  Who do I need to see? It has been there for a while and was there when I had my last mammogram.   But - it has gotten bigger and now looks like a bruise. Thank you for your time.

## 2022-08-06 DIAGNOSIS — M35.00 SJOGREN'S DISEASE (HCC): ICD-10-CM

## 2022-08-08 RX ORDER — CEVIMELINE HYDROCHLORIDE 30 MG/1
CAPSULE ORAL
Qty: 270 CAPSULE | Refills: 3 | Status: SHIPPED | OUTPATIENT
Start: 2022-08-08

## 2022-08-12 ENCOUNTER — HOSPITAL ENCOUNTER (OUTPATIENT)
Dept: MAMMOGRAPHY | Age: 62
Discharge: HOME | End: 2022-08-12
Attending: INTERNAL MEDICINE
Payer: COMMERCIAL

## 2022-08-12 ENCOUNTER — HOSPITAL ENCOUNTER (OUTPATIENT)
Dept: ULTRASOUND IMAGING | Age: 62
Discharge: HOME | End: 2022-08-12
Attending: INTERNAL MEDICINE
Payer: COMMERCIAL

## 2022-08-12 DIAGNOSIS — N63.20 MASS OF LEFT BREAST, UNSPECIFIED QUADRANT: ICD-10-CM

## 2022-08-12 PROCEDURE — 76642 ULTRASOUND BREAST LIMITED: CPT

## 2022-08-12 PROCEDURE — 77061 BREAST TOMOSYNTHESIS UNI: CPT

## 2022-09-05 DIAGNOSIS — E78.5 HYPERLIPIDEMIA, UNSPECIFIED HYPERLIPIDEMIA TYPE: ICD-10-CM

## 2022-09-05 DIAGNOSIS — R93.1 AGATSTON CAC SCORE, <100: ICD-10-CM

## 2022-09-09 NOTE — ANESTHESIA PREPROCEDURE EVALUATION
Relevant Problems   CARDIOVASCULAR   (+) Essential hypertension      GASTROINTESTINAL   (+) Gastroesophageal reflux disease without esophagitis      ENDOCRINE   (+) Hyperthyroidism       Anesthetic History   No history of anesthetic complications            Review of Systems / Medical History  Patient summary reviewed, nursing notes reviewed and pertinent labs reviewed    Pulmonary  Within defined limits                 Neuro/Psych   Within defined limits           Cardiovascular    Hypertension              Exercise tolerance: >4 METS     GI/Hepatic/Renal     GERD           Endo/Other      Hypothyroidism  Morbid obesity and arthritis     Other Findings              Physical Exam    Airway  Mallampati: II  TM Distance: 4 - 6 cm  Neck ROM: normal range of motion   Mouth opening: Normal     Cardiovascular  Regular rate and rhythm,  S1 and S2 normal,  no murmur, click, rub, or gallop  Rhythm: regular  Rate: normal         Dental  No notable dental hx       Pulmonary  Breath sounds clear to auscultation               Abdominal  GI exam deferred       Other Findings            Anesthetic Plan    ASA: 3  Anesthesia type: regional and spinal - saphenous block      Post-op pain plan if not by surgeon: peripheral nerve block single    Induction: Intravenous  Anesthetic plan and risks discussed with: Patient Vascular Surgery Discharge Summary    NAME: Smiley Harris   MRN: [id number]   : 1942     DATE OF ADMISSION: 2022     PRE/POSTOPERATIVE DIAGNOSES:  Progressive high-grade asymptomatic calcified right internal carotid artery stenosis. Possible acute CHF    PROCEDURES PERFORMED, 2022:    Right carotid endarterectomy with distal facial vein patch angioplasty.   A.  Intraoperative shunting and EEG monitoring.  INTRAOPERATIVE FINDINGS: Common carotid artery measured approximately 7 mm in diameter, had some mild disease.  There was disease in the origin of the external carotid artery, but none distally.  Approximately 2.5 cm beyond the ICA origin, the vessel was completely free of disease     POSTOPERATIVE COMPLICATIONS: Patient had chest pain that resolved-had ECHO done, the patient has an extensive cardiac history including CAD with three-vessel CABG, atrial fibrillation with RVR. The workup for the chest pain included proBNP, CBC, and BNP, CXR, ECHO, and teleemetry. The patient recheck of troponin were negative. The patient's symptoms resolved without intervention, will see cardiology in addition to PCP in one week for repeat BMP and cardiac evaluation for possible CHF. No further workup was needed inpatient, symptoms resolved, patient medically stable to go home.     DATE OF DISCHARGE: 2022     HOSPITAL COURSE: Smiley Harris is a 80 year old female who on 2022 underwent the above-named procedures.  She tolerated the procedure well and postoperatively was transferred to the general post-surgical unit.  The remainder of her course was essentiallly uncomplicated, with the exception listed above, in addition to anxiety, which was controlled with .  Prior to discharge, her pain was controlled well with acetaminophen. She was able to perform ADLs and ambulate independently without difficulty, and had full return of bowel and bladder function.  On 2022, she was discharged to home in stable  condition.    DISCHARGE INSTRUCTIONS:  Copy from AVS    FOLLOW UP APPOINTMENTS:   Copy from AVS    DISCHARGE MEDICATIONS:     Review of your medicines      START taking      Dose / Directions   apixaban ANTICOAGULANT 2.5 MG tablet  Commonly known as: ELIQUIS  Used for: Paroxysmal atrial fibrillation (H)      Dose: 2.5 mg  Take 1 tablet (2.5 mg) by mouth 2 times daily  Quantity: 60 tablet  Refills: 3     clopidogrel 75 MG tablet  Commonly known as: PLAVIX  Used for: Carotid stenosis, asymptomatic, right      Dose: 75 mg  Take 1 tablet (75 mg) by mouth daily  Quantity: 30 tablet  Refills: 3        CONTINUE these medicines which may have CHANGED, or have new prescriptions. If we are uncertain of the size of tablets/capsules you have at home, strength may be listed as something that might have changed.      Dose / Directions   rosuvastatin 20 MG tablet  Commonly known as: CRESTOR  This may have changed:     medication strength    how much to take    when to take this  Used for: Carotid stenosis, asymptomatic, right, Mixed hyperlipidemia      Dose: 20 mg  Take 1 tablet (20 mg) by mouth every evening  Quantity: 30 tablet  Refills: 3        CONTINUE these medicines which have NOT CHANGED      Dose / Directions   hydrochlorothiazide 25 MG tablet  Commonly known as: HYDRODIURIL      Dose: 25 mg  Take 25 mg by mouth every morning  Refills: 0     * levothyroxine 25 MCG tablet  Commonly known as: SYNTHROID/LEVOTHROID      Dose: 25 mcg  Take 25 mcg by mouth  Refills: 0     * levothyroxine 25 MCG tablet  Commonly known as: SYNTHROID/LEVOTHROID      Dose: 25 mcg  Take 25 mcg by mouth every morning  Refills: 0     losartan 50 MG tablet  Commonly known as: COZAAR      Dose: 50 mg  Take 50 mg by mouth every morning  Refills: 0     metoprolol tartrate 25 MG tablet  Commonly known as: LOPRESSOR      Dose: 25 mg  Take 25 mg by mouth 2 times daily  Refills: 0     omeprazole 20 MG DR capsule  Commonly known as: priLOSEC      Dose: 20  mg  Take 20 mg by mouth  Refills: 0         * This list has 2 medication(s) that are the same as other medications prescribed for you. Read the directions carefully, and ask your doctor or other care provider to review them with you.            STOP taking    aspirin 81 MG tablet  Commonly known as: ASA              Where to get your medicines      These medications were sent to [facility] - [address] [place] [address]  [address] [place] [address] [address]    Phone: [phone]     apixaban ANTICOAGULANT 2.5 MG tablet    clopidogrel 75 MG tablet    rosuvastatin 20 MG tablet

## 2022-10-12 DIAGNOSIS — K21.9 GASTROESOPHAGEAL REFLUX DISEASE WITHOUT ESOPHAGITIS: ICD-10-CM

## 2022-10-12 RX ORDER — AMLODIPINE BESYLATE 10 MG/1
TABLET ORAL
Qty: 90 TABLET | Refills: 3 | Status: SHIPPED | OUTPATIENT
Start: 2022-10-12

## 2022-10-12 RX ORDER — PANTOPRAZOLE SODIUM 40 MG/1
TABLET, DELAYED RELEASE ORAL
Qty: 90 TABLET | Refills: 3 | Status: SHIPPED | OUTPATIENT
Start: 2022-10-12

## 2022-11-08 NOTE — TELEPHONE ENCOUNTER
Patient requesting a refill on amlodipine that was sent in July 10 for 90 day supply and 3 refills. Patient was advised to contact pharmacy for a refill.
Detail Level: Detailed
Add 52760 Cpt? (Important Note: In 2017 The Use Of 78428 Is Being Tracked By Cms To Determine Future Global Period Reimbursement For Global Periods): yes

## 2022-11-22 ENCOUNTER — TELEPHONE (OUTPATIENT)
Dept: INTERNAL MEDICINE CLINIC | Age: 62
End: 2022-11-22

## 2022-11-22 ENCOUNTER — APPOINTMENT (OUTPATIENT)
Dept: INTERNAL MEDICINE CLINIC | Age: 62
End: 2022-11-22

## 2022-11-22 DIAGNOSIS — R73.01 IFG (IMPAIRED FASTING GLUCOSE): ICD-10-CM

## 2022-11-22 DIAGNOSIS — E78.5 HYPERLIPIDEMIA, UNSPECIFIED HYPERLIPIDEMIA TYPE: Primary | ICD-10-CM

## 2022-11-22 DIAGNOSIS — E78.5 HYPERLIPIDEMIA, UNSPECIFIED HYPERLIPIDEMIA TYPE: ICD-10-CM

## 2022-11-24 NOTE — PROGRESS NOTES
60 y.o. WHITE/NON- female who presents for evaluation    She's golfing weekly, gardening 2x/week, dancing, etc.  No cardiovascular complaints    Denies polyuria, polydipsia, nocturia, vision change. Not checking sugars at this time. Weight continues to be an issue. She's doing IF with a 12 to 8 window and doing low carb and the weight is not moving. We added qsymia at the last visit but it dried out her mouth so stopped    Vitals 11/29/2022 7/19/2022 3/3/2022   Weight 200 lb 192 lb 192 lb     Dr Courtney Gee weaned off the Tapazole and continues to follow her every. They had a long discussion about obesity in general    The Sjogren's is being tx'ed w evoxac, not seeing Dr Mert Abraham at this time    No GI or  complaints.  Sees Dr Violette Ding for the IPMN, she stopped drinking since the pancreatitis episode    Past Medical History:   Diagnosis Date    Adrenal adenoma, left 02/2022    on CT; >1cm on mri (4/22)    Agatston CAC score, <100 07/2019    ca score 51; mild plaque LAD and RCA    Allergic rhinitis Dr. Shubham Healy 2011     Colon adenoma 08/2016    Dr Rosemary Ding 2007, 2011    COVID-19 virus infection 01/2021    FHx: heart disease     Gastritis 07/2017    h pylori+ Dr Violette Ding    GERD (gastroesophageal reflux disease) 2007    [facility]    Hyperlipidemia     calculated risk score 1.9% (2/13); elected to take statins due to Fhx, elev hscrp/ldl-p but intol of 4 diff ones    Hypertension     Hyperthyroidism 06/2018    Dr Courtney Gee; tapazole    IFG (impaired fasting glucose) 2017    IPMN (intraductal papillary mucinous neoplasm) 02/2022    on CT; 6mm on mri (4/22)    Obesity (BMI 30.0-34.9)     IF 6/18 start weight 183 lbs but ineffective    Osteoarthritis of both knees     Dr. Codie Correa; cymbalta in past    Overweight (BMI 25.0-29.9)     saw Dr Cecilia Galarza; qsymia ineffective 6/14-6/15    Pancreatitis 02/2022    postop; saw Dr Violette Ding    Sjogren's disease, probable Dr. Mert Abraham 08/2012     Past Surgical History:   Procedure Laterality Date    HX APPENDECTOMY      HX BREAST BIOPSY Right     HX CHOLECYSTECTOMY      HX COLONOSCOPY      [facility] 2007, Dr. Nae Cannon (); () adenoma; () neg    HX KNEE REPLACEMENT Bilateral     Dr Alden Eason (); Dr Aleida Flores ()    HX TUBAL LIGATION       Social History     Socioeconomic History    Marital status: SINGLE     Spouse name: Not on file    Number of children: 3    Years of education: Not on file    Highest education level: Not on file   Occupational History    Occupation:  [employer]   Tobacco Use    Smoking status: Former     Packs/day: 0.50     Years: 15.00     Pack years: 7.50     Types: Cigarettes     Quit date: 2000     Years since quittin.9    Smokeless tobacco: Never   Vaping Use    Vaping Use: Never used   Substance and Sexual Activity    Alcohol use:  Yes     Alcohol/week: 1.0 standard drink     Types: 1 Glasses of wine per week    Drug use: No    Sexual activity: Not Currently   Other Topics Concern    Not on file   Social History Narrative    Not on file     Social Determinants of Health     Financial Resource Strain: Not on file   Food Insecurity: Not on file   Transportation Needs: Not on file   Physical Activity: Not on file   Stress: Not on file   Social Connections: Not on file   Intimate Partner Violence: Not on file   Housing Stability: Not on file     Family History   Problem Relation Age of Onset    Hypertension Mother     Heart Disease Father     Cancer Paternal Grandmother         ovarian    Cancer Paternal Aunt     Ovarian Cancer Maternal Grandmother      Immunization History   Administered Date(s) Administered    COVID-19, MODERNA BLUE border, Primary or Immunocompromised, (age 18y+), IM, 100 mcg/0.5mL 2021, 2021    Influenza Vaccine 10/10/2020    Influenza Vaccine PF 2013    Influenza, FLUARIX, FLULAVAL, FLUZONE (age 10 mo+) AND AFLURIA, (age 1 y+), PF, 0.5mL 2016, 2018, 11/29/2022    Influenza, FLUCELVAX, (age 10 mo+), MDCK, MDV 10/05/2021    TD Vaccine 02/01/2007     Current Outpatient Medications   Medication Sig    naltrexone-buPROPion (CONTRAVE) 8-90 mg TbER ER tablet Week 1 1 tab PO QAM, Week 2 1QAM 1QHS, Week 3 2QAM 1 QHS, Week 4 & beyond 2QAM 2QHS    pantoprazole (PROTONIX) 40 mg tablet take 1 tablet by mouth once daily    amLODIPine (NORVASC) 10 mg tablet take 1 tablet by mouth once daily    pitavastatin calcium (Livalo) 1 mg tab tablet take 1 tablet by mouth once daily    cevimeline (EVOXAC) 30 mg capsule take 1 capsule by mouth three times a day    zolpidem (AMBIEN) 10 mg tablet Take 1 Tablet by mouth nightly as needed for Sleep. Max Daily Amount: 10 mg.    celecoxib (CELEBREX) 200 mg capsule take 1 capsule once daily if needed for pain    amoxicillin (AMOXIL) 500 mg capsule Take 4 tabs 1 hour before procedure (Patient not taking: Reported on 11/29/2022)     No current facility-administered medications for this visit.      Allergies   Allergen Reactions    Benazepril Cough    Chantix [Varenicline] Nausea and Vomiting    Codeine Nausea and Vomiting and Other (comments)     Abdominal Pain    Crestor [Rosuvastatin] Myalgia    Lipitor [Atorvastatin] Myalgia    Pravastatin Myalgia    Prevacid [Lansoprazole] Other (comments)     Constipation    Qsymia [Phentermine-Topiramate] Other (comments)     Dry mouth      Zocor [Simvastatin] Myalgia     REVIEW OF SYSTEMS:  gyn Dr. Holly Rubio, mammo 8/22, colo 8/16 Dr Lenny Plata  Ophtho - no vision change or eye pain  Oral - no mouth pain, tongue or tooth problems  Ears - no hearing loss, ear pain, fullness, no swallowing problems  Cardiac - no CP, PND, orthopnea, edema, palpitations or syncope  Chest - no breast masses  Resp - no wheezing, chronic coughing, dyspnea  Neuro - no focal weakness, numbness, paresthesias, incoordination, ataxia, involuntary movements  Endo - no polyuria, polydipsia, nocturia, hot flashes    Visit Vitals  /78 (BP 1 Location: Left upper arm, BP Patient Position: Sitting)   Pulse 79   Temp 97.7 °F (36.5 °C) (Temporal)   Resp 14   Ht 5' 5\" (1.651 m)   Wt 200 lb (90.7 kg)   SpO2 94%   BMI 33.28 kg/m²   Affect is appropriate. Mood stable  No apparent distress  HEENT --Anicteric sclerae. No thyromegaly, JVD, or bruits. Lungs --Clear to auscultation, normal percussion. Heart --Regular rate and rhythm, no murmurs, rubs, gallops, or clicks. Chest wall --Nontender to palpation. PMI normal.  Abdomen -- Soft and nontender, no hepatosplenomegaly or masses. Extremities -- Without cyanosis, clubbing, edema. 2+ pulses equally and bilaterally.     LABS  From 10/11 showed gluc 92,   cr 0.91, gfr 73, alt 16,         ldl-p 1793, chol 221, tg 99,   hdl 65, ldl-c 136,  wbc 6.1, hb 14.5, plt 249, jim neg, vit d 37.1, esr 5  From 2/13 showed   gluc 95,   cr 0.82, gfr 82, alt <5                                    chol 220, tg 117, hdl 53, ldl-c 144  From 4/13 showed   gluc 85,   cr 0.70,            alt 15, hba1c 5.0, ldl-p 1372, chol 163, tg 68,    hdl 60, ldl-c 104, wbc 5.7, hb 13.8, plt 228, ua neg, hscrp 3.8, uric 2.8, tsh 0.86, vit d 36, ua neg  From 6/13 showed   gluc 111, cr 0.70, gfr 99, alt 12,      chol 160, tg 73,   hdl 47, ldl-c 98  From 12/13 showed gluc 92,   cr 0.74, gfr 92, alt 13, hba1c 5.6, ldl-p 1055, chol 158, tg 82,   hdl 54, ldl-c 88  From 6/14 showed           chol 162, tg 97,   hdl 55, ldl-c 88,   wbc 6.2, hb 13.5, plt 228, ua neg  From 6/15 showed   gluc 98,   cr 1.01, gfr 57, alt<5, hba1c 5.5,     chol 177, tg 128, hdl 53, ldl-c 98,   wbc 5.8, hb 14.3, plt 238, ua neg  From 8/16 showed   gluc 102, cr 0.90, gfr 72, alt 13,      chol 241, tg 98,   hdl 68, ldl-c 156, wbc 4.8, hb 14.3, plt 255, ua neg,   hep c-  From 12/16 showed       alt 14,      chol 215, tg 116, hdl 62, ldl-c 130  From 4/17 showed       alt 12,      chol 189, tg 127, hdl 59, ldl-c 105  From 4/17 showed   gluc 102, cr 0.97, gfr 79,                   wbc 5.6, hb 14.5, plt 291, tsh 1.84, ft4 1.29  From 2/18 showed   gluc 92,   cr 0.86, gfr>60,                   wbc 5.2, hb 12.7, plt 561  From 6/18 showed   gluc 89,   cr 0.71, gfr 94, alt 18,                 chol 174, tg 152, hdl 42, ldl-c 102, wbc 3.2, hb 12.8, plt 240  From 7/18 showed                                tsh<0.01, ft4 1.96, TRAb+  From 7/19 showed   glu 108,   cr 0.87, gfr 73, alt 13,      chol 213, tg 74,   hdl 58, ldl-c 140, wbc 4.7, hb 14.3, plt 246, tsh 1.74  From 7/20 showed   gluc 87,   cr 0.81, gfr 79, alt 17,      chol 190, tg 142, hdl 64, ldl-c 98  From 7/21 showed   glu 104,   cr 0.87, gfr 72, alt 18,      chol 178, tg 97,   hdl 71, ldl-c 90,   wbc 5.0, hb 13.5, plt 241, tsh 1.49, ft4 1.25  From 1/22 showed   gluc 94,   cr 0.77, gfr>60,                   wbc 6.8, hb 13.8, plt 317  From 7/22 showed   gluc 99,   cr 0.73, gfr>60, alt 20,        chol 212, tg 66,   hdl 70, ldl-c 130, wbc 4.4, hb 13.5, plt 261  From 11/22 showed gluc 101, cr 0.71, gfr>60, alt 16, hba1c 5.4,                 wbc 4.9, hb 14.0, plt 245    We reviewed the patient's labs from the last several visits to point out trends in the numbers        Patient Active Problem List   Diagnosis Code    Hyperlipidemia E78.5    Colon polyps Dr. Tere Vela 2011, adenoma 8/16 K63.5    Sjogren's disease, probable Dr. Kenzie Mota M35.00    Essential hypertension I10    Arthritis, degenerative knees Dr Rosa Beckett M19.90    Gastroesophageal reflux disease without esophagitis K21.9    Allergic rhinitis J30.9    Obesity (BMI 30.0-34. 9) E66.9    Hyperthyroidism E05.90    IFG (impaired fasting glucose) R73.01     ASSESSMENT AND PLAN:  1. Allergic rhinitis. Continue current  2. GERD. Continue ppi and avoidance measures  3. Colon polyps. F/U 2029 per GLST, fiber  4. Hyperthyroidism. Follow-up Dr. Greg Giordano  5. Hypertension. Controlled on current  6. Sjogren's. Continue current and f/u Dr. Kenzie Mota prn  7. IFG. Lifestyle and dietary measures.   Portion control reiterated, wt loss would be idea  8. Arthritis. Prn celebrex; she has stopped the cymbalta  9. IPMN. Yearly mri per protocol  10. Hyperlipidemia. Continue pitava 1mg and she willl do better with the diet shifting to mediterranean approach  11. Obesity. Lifestyle and dietary measures. Another long discussion about treatment options. Trial of contrave after reviewing sfx        RTC 5/23    Above conditions discussed at length and patient vocalized understanding. All questions answered to patient satisfaction        ICD-10-CM ICD-9-CM    1. Needs flu shot  Z23 V04.81 INFLUENZA, FLUARIX, FLULAVAL, FLUZONE (AGE 6 MO+), AFLURIA(AGE 3Y+) IM, PF, 0.5 ML      2. Obesity (BMI 30.0-34. 9)  E66.9 278.00 naltrexone-buPROPion (CONTRAVE) 8-90 mg TbER ER tablet      3. IFG (impaired fasting glucose)  R73.01 790.21       4. Hyperthyroidism  E05.90 242.90       5. Hyperlipidemia, unspecified hyperlipidemia type  E78.5 272.4       6.  Essential hypertension  I10 401.9

## 2022-11-28 NOTE — PROGRESS NOTES
Ozzy Jean presents today for No chief complaint on file. Follow up  w lab   Essential HTN   Insomnia, unspecified   GERD   IFG   Hyperthyroidism   Sjogren's syndrome     1. \"Have you been to the ER, urgent care clinic since your last visit? Hospitalized since your last visit? \" no    2. \"Have you seen or consulted any other health care providers outside of the [address] [place] [address] since your last visit? \" yes     3. For patients aged 39-70: Has the patient had a colonoscopy / FIT/ Cologuard? Yes - no Care Gap present      If the patient is female:    4. For patients aged 41-77: Has the patient had a mammogram within the past 2 years? Yes - no Care Gap present  See top three    5. For patients aged 21-65: Has the patient had a pap smear?  Yes - no Care Gap present

## 2022-11-29 ENCOUNTER — OFFICE VISIT (OUTPATIENT)
Dept: INTERNAL MEDICINE CLINIC | Age: 62
End: 2022-11-29
Payer: COMMERCIAL

## 2022-11-29 VITALS
DIASTOLIC BLOOD PRESSURE: 78 MMHG | BODY MASS INDEX: 33.32 KG/M2 | HEART RATE: 79 BPM | TEMPERATURE: 97.7 F | RESPIRATION RATE: 14 BRPM | HEIGHT: 65 IN | SYSTOLIC BLOOD PRESSURE: 138 MMHG | OXYGEN SATURATION: 94 % | WEIGHT: 200 LBS

## 2022-11-29 DIAGNOSIS — Z23 NEEDS FLU SHOT: Primary | ICD-10-CM

## 2022-11-29 DIAGNOSIS — I10 ESSENTIAL HYPERTENSION: ICD-10-CM

## 2022-11-29 DIAGNOSIS — E66.9 OBESITY (BMI 30.0-34.9): ICD-10-CM

## 2022-11-29 DIAGNOSIS — R73.01 IFG (IMPAIRED FASTING GLUCOSE): ICD-10-CM

## 2022-11-29 DIAGNOSIS — E78.5 HYPERLIPIDEMIA, UNSPECIFIED HYPERLIPIDEMIA TYPE: ICD-10-CM

## 2022-11-29 DIAGNOSIS — E05.90 HYPERTHYROIDISM: ICD-10-CM

## 2022-11-29 PROCEDURE — 90686 IIV4 VACC NO PRSV 0.5 ML IM: CPT | Performed by: INTERNAL MEDICINE

## 2022-11-29 PROCEDURE — 99214 OFFICE O/P EST MOD 30 MIN: CPT | Performed by: INTERNAL MEDICINE

## 2022-11-29 PROCEDURE — 3078F DIAST BP <80 MM HG: CPT | Performed by: INTERNAL MEDICINE

## 2022-11-29 PROCEDURE — 3074F SYST BP LT 130 MM HG: CPT | Performed by: INTERNAL MEDICINE

## 2022-11-29 PROCEDURE — 90471 IMMUNIZATION ADMIN: CPT | Performed by: INTERNAL MEDICINE

## 2022-11-29 NOTE — PROGRESS NOTES
After obtaining consent, and per orders of Dr. Zohra Morris, injection of flu shot given by Donna Patino. Patient instructed to remain in clinic for 20 minutes afterwards, and to report any adverse reaction to me immediately. Harini Lester presents today for   Chief Complaint   Patient presents with    Follow-up       Vitals:    11/29/22 0904   BP: 138/78   Pulse: 79   Resp: 14   Temp: 97.7 °F (36.5 °C)   TempSrc: Temporal   SpO2: 94%   Weight: 200 lb (90.7 kg)   Height: 5' 5\" (1.651 m)        Harini Lester preferred language for health care discussion is english/other. Is someone accompanying this pt? no    Is the patient using any DME equipment during [address]? no    Depression Screening:  3 most recent PHQ Screens 11/29/2022   Little interest or pleasure in doing things Not at all   Feeling down, depressed, irritable, or hopeless Not at all   Total Score PHQ 2 0       Learning Assessment:  Learning Assessment 10/8/2021   PRIMARY LEARNER Patient   HIGHEST LEVEL OF EDUCATION - PRIMARY LEARNER  GRADUATED HIGH SCHOOL OR GED   BARRIERS PRIMARY LEARNER NONE   CO-LEARNER CAREGIVER -   PRIMARY LANGUAGE ENGLISH   LEARNER PREFERENCE PRIMARY DEMONSTRATION   LEARNING SPECIAL TOPICS no   ANSWERED BY self   RELATIONSHIP SELF       Abuse Screening:  Abuse Screening Questionnaire 7/19/2022   Do you ever feel afraid of your partner? N   Are you in a relationship with someone who physically or mentally threatens you? N   Is it safe for you to go home? Y       Generalized Anxiety  No flowsheet data found. Health Maintenance Due   Topic Date Due    DTaP/Tdap/Td series (1 - Tdap) 02/02/2007    Shingrix Vaccine Age 50> (1 of 2) Never done    COVID-19 Vaccine (3 - Booster for Moderna series) 09/26/2021    Flu Vaccine (1) 08/01/2022   . Health Maintenance reviewed and discussed and ordered per Provider. VACCINES DUE   SCREENINGS DUE       Harini Lester is updated on all HM    1.  \"Have you been to the ER, urgent care clinic since your last visit? Hospitalized since your last visit? \" No    2. \"Have you seen or consulted any other health care providers outside of the [address] [place] [address] since your last visit? \" No     3. For patients aged 39-70: Has the patient had a colonoscopy / FIT/ Cologuard? Yes - Care Gap present. Most recent result on file     If the patient is female:    4. For patients aged 41-77: Has the patient had a mammogram within the past 2 years? Yes - Care Gap present. Most recent result on file    5. For patients aged 21-65: Has the patient had a pap smear?  Yes - no Care Gap present

## 2022-11-29 NOTE — PATIENT INSTRUCTIONS
Vaccine Information Statement    Influenza (Flu) Vaccine (Inactivated or Recombinant): What You Need to Know    Many vaccine information statements are available in Polish and other languages. See www.immunize.org/vis. Hojas de información sobre vacunas están disponibles en español y en muchos otros idiomas. Visite www.immunize.org/vis. 1. Why get vaccinated? Influenza vaccine can prevent influenza (flu). Flu is a contagious disease that spreads around the [facility] every year, usually between October and May. Anyone can get the flu, but it is more dangerous for some people. Infants and young children, people 72 years and older, pregnant people, and people with certain health conditions or a weakened immune system are at greatest risk of flu complications. Pneumonia, bronchitis, sinus infections, and ear infections are examples of flu-related complications. If you have a medical condition, such as heart disease, cancer, or diabetes, flu can make it worse. Flu can cause fever and chills, sore throat, muscle aches, fatigue, cough, headache, and runny or stuffy nose. Some people may have vomiting and diarrhea, though this is more common in children than adults. In an average year, thousands of people in the [facility] die from flu, and many more are hospitalized. Flu vaccine prevents millions of illnesses and flu-related visits to the doctor each year. 2. Influenza vaccines     CDC recommends everyone 6 months and older get vaccinated every flu season. Children 6 months through 6years of age may need 2 doses during a single flu season. Everyone else needs only 1 dose each flu season. It takes about 2 weeks for protection to develop after vaccination. There are many flu viruses, and they are always changing. Each year a new flu vaccine is made to protect against the influenza viruses believed to be likely to cause disease in the upcoming flu season.  Even when the vaccine doesnt exactly match these viruses, it may still provide some protection. Influenza vaccine does not cause flu. Influenza vaccine may be given at the same time as other vaccines. 3. Talk with your health care provider    Tell your vaccination provider if the person getting the vaccine:  Has had an allergic reaction after a previous dose of influenza vaccine, or has any severe, life-threatening allergies   Has ever had Guillain-Barré Syndrome (also called GBS)    In some cases, your health care provider may decide to postpone influenza vaccination until a future visit. Influenza vaccine can be administered at any time during pregnancy. People who are or will be pregnant during influenza season should receive inactivated influenza vaccine. People with minor illnesses, such as a cold, may be vaccinated. People who are moderately or severely ill should usually wait until they recover before getting influenza vaccine. Your health care provider can give you more information. 4. Risks of a vaccine reaction    Soreness, redness, and swelling where the shot is given, fever, muscle aches, and headache can happen after influenza vaccination. There may be a very small increased risk of Guillain-Barré Syndrome (GBS) after inactivated influenza vaccine (the flu shot). [facility] children who get the flu shot along with pneumococcal vaccine (PCV13) and/or DTaP vaccine at the same time might be slightly more likely to have a seizure caused by fever. Tell your health care provider if a child who is getting flu vaccine has ever had a seizure. People sometimes faint after medical procedures, including vaccination. Tell your provider if you feel dizzy or have vision changes or ringing in the ears. As with any medicine, there is a very remote chance of a vaccine causing a severe allergic reaction, other serious injury, or death. 5. What if there is a serious problem?     An allergic reaction could occur after the vaccinated person leaves the clinic. If you see signs of a severe allergic reaction (hives, swelling of the face and throat, difficulty breathing, a fast heartbeat, dizziness, or weakness), call 9-1-1 and get the person to the nearest hospital.    For other signs that concern you, call your health care provider. Adverse reactions should be reported to the Vaccine Adverse Event Reporting System (VAERS). Your health care provider will usually file this report, or you can do it yourself. Visit the VAERS website at www.vaers. [facility].gov or call 8-627.379.4088. VAERS is only for reporting reactions, and VAERS staff members do not give medical advice. 6. The National Vaccine Injury Compensation Program    The [facility] Vaccine Injury Compensation Program (VICP) is a federal program that was created to compensate people who may have been injured by certain vaccines. Claims regarding alleged injury or death due to vaccination have a time limit for filing, which may be as short as two years. Visit the VICP website at www.[employer].gov/vaccinecompensation or call 3-738.305.8700 to learn about the program and about filing a claim. 7. How can I learn more? Ask your health care provider. Call your local or state health department. Visit the website of the Food and Drug Administration (FDA) for vaccine package inserts and additional information at www.fda.gov/vaccines-blood-biologics/vaccines. Contact the Centers for Disease Control and Prevention (CDC): Call 0-267.428.3600 (1-800-CDC-INFO) or  Visit CDCs influenza website at www.cdc.gov/flu. Vaccine Information Statement   Inactivated Influenza Vaccine   8/6/2021  42 CADE Watson [id number]   Department of Health and Human Services  Centers for Disease Control and Prevention    Office Use Only

## 2023-02-04 DIAGNOSIS — R73.01 IFG (IMPAIRED FASTING GLUCOSE): Primary | ICD-10-CM

## 2023-02-05 DIAGNOSIS — E78.5 HYPERLIPIDEMIA, UNSPECIFIED HYPERLIPIDEMIA TYPE: Primary | ICD-10-CM

## 2023-02-23 DIAGNOSIS — G47.00 INSOMNIA, UNSPECIFIED TYPE: Primary | ICD-10-CM

## 2023-02-24 RX ORDER — ZOLPIDEM TARTRATE 10 MG/1
TABLET ORAL
Qty: 30 TABLET | Refills: 1 | Status: SHIPPED | OUTPATIENT
Start: 2023-02-24 | End: 2023-03-26

## 2023-05-29 DIAGNOSIS — E78.5 HYPERLIPIDEMIA, UNSPECIFIED HYPERLIPIDEMIA TYPE: ICD-10-CM

## 2023-05-29 DIAGNOSIS — R73.01 IFG (IMPAIRED FASTING GLUCOSE): ICD-10-CM

## 2023-07-14 ENCOUNTER — NURSE ONLY (OUTPATIENT)
Age: 63
End: 2023-07-14

## 2023-07-14 DIAGNOSIS — E78.5 HYPERLIPIDEMIA, UNSPECIFIED HYPERLIPIDEMIA TYPE: ICD-10-CM

## 2023-07-14 DIAGNOSIS — R73.01 IFG (IMPAIRED FASTING GLUCOSE): ICD-10-CM

## 2023-07-15 LAB
BUN SERPL-MCNC: 12 MG/DL (ref 8–27)
BUN/CREAT SERPL: 15 (ref 12–28)
CALCIUM SERPL-MCNC: 9 MG/DL (ref 8.7–10.3)
CHLORIDE SERPL-SCNC: 102 MMOL/L (ref 96–106)
CHOLEST SERPL-MCNC: 178 MG/DL (ref 100–199)
CO2 SERPL-SCNC: 27 MMOL/L (ref 20–29)
CREAT SERPL-MCNC: 0.81 MG/DL (ref 0.57–1)
EGFRCR SERPLBLD CKD-EPI 2021: 82 ML/MIN/1.73
GLUCOSE SERPL-MCNC: 88 MG/DL (ref 70–99)
HDLC SERPL-MCNC: 69 MG/DL
LDLC SERPL CALC-MCNC: 96 MG/DL (ref 0–99)
POTASSIUM SERPL-SCNC: 4.4 MMOL/L (ref 3.5–5.2)
SODIUM SERPL-SCNC: 141 MMOL/L (ref 134–144)
SPECIMEN STATUS REPORT: NORMAL
TRIGL SERPL-MCNC: 68 MG/DL (ref 0–149)
VLDLC SERPL CALC-MCNC: 13 MG/DL (ref 5–40)

## 2023-07-20 ENCOUNTER — OFFICE VISIT (OUTPATIENT)
Age: 63
End: 2023-07-20
Payer: COMMERCIAL

## 2023-07-20 DIAGNOSIS — D49.0 IPMN (INTRADUCTAL PAPILLARY MUCINOUS NEOPLASM): ICD-10-CM

## 2023-07-20 DIAGNOSIS — K63.5 POLYP OF COLON, UNSPECIFIED PART OF COLON, UNSPECIFIED TYPE: ICD-10-CM

## 2023-07-20 DIAGNOSIS — E05.90 HYPERTHYROIDISM: ICD-10-CM

## 2023-07-20 DIAGNOSIS — R73.01 IFG (IMPAIRED FASTING GLUCOSE): Primary | ICD-10-CM

## 2023-07-20 DIAGNOSIS — I10 ESSENTIAL HYPERTENSION: ICD-10-CM

## 2023-07-20 DIAGNOSIS — M35.00 SJOGREN'S SYNDROME, WITH UNSPECIFIED ORGAN INVOLVEMENT (HCC): ICD-10-CM

## 2023-07-20 DIAGNOSIS — E78.5 HYPERLIPIDEMIA, UNSPECIFIED HYPERLIPIDEMIA TYPE: ICD-10-CM

## 2023-07-20 DIAGNOSIS — E66.9 OBESITY (BMI 30.0-34.9): ICD-10-CM

## 2023-07-20 DIAGNOSIS — Z00.00 PHYSICAL EXAM: ICD-10-CM

## 2023-07-20 LAB — HBA1C MFR BLD: 5.3 %

## 2023-07-20 PROCEDURE — 3074F SYST BP LT 130 MM HG: CPT | Performed by: INTERNAL MEDICINE

## 2023-07-20 PROCEDURE — 3078F DIAST BP <80 MM HG: CPT | Performed by: INTERNAL MEDICINE

## 2023-07-20 PROCEDURE — 83036 HEMOGLOBIN GLYCOSYLATED A1C: CPT | Performed by: INTERNAL MEDICINE

## 2023-07-20 PROCEDURE — 99396 PREV VISIT EST AGE 40-64: CPT | Performed by: INTERNAL MEDICINE

## 2023-07-20 SDOH — ECONOMIC STABILITY: FOOD INSECURITY: WITHIN THE PAST 12 MONTHS, THE FOOD YOU BOUGHT JUST DIDN'T LAST AND YOU DIDN'T HAVE MONEY TO GET MORE.: NEVER TRUE

## 2023-07-20 SDOH — ECONOMIC STABILITY: INCOME INSECURITY: HOW HARD IS IT FOR YOU TO PAY FOR THE VERY BASICS LIKE FOOD, HOUSING, MEDICAL CARE, AND HEATING?: NOT HARD AT ALL

## 2023-07-20 SDOH — ECONOMIC STABILITY: HOUSING INSECURITY
IN THE LAST 12 MONTHS, WAS THERE A TIME WHEN YOU DID NOT HAVE A STEADY PLACE TO SLEEP OR SLEPT IN A SHELTER (INCLUDING NOW)?: NO

## 2023-07-20 SDOH — ECONOMIC STABILITY: FOOD INSECURITY: WITHIN THE PAST 12 MONTHS, YOU WORRIED THAT YOUR FOOD WOULD RUN OUT BEFORE YOU GOT MONEY TO BUY MORE.: NEVER TRUE

## 2023-07-20 ASSESSMENT — PATIENT HEALTH QUESTIONNAIRE - PHQ9
SUM OF ALL RESPONSES TO PHQ QUESTIONS 1-9: 0
2. FEELING DOWN, DEPRESSED OR HOPELESS: 0
SUM OF ALL RESPONSES TO PHQ QUESTIONS 1-9: 0
SUM OF ALL RESPONSES TO PHQ9 QUESTIONS 1 & 2: 0
SUM OF ALL RESPONSES TO PHQ QUESTIONS 1-9: 0
SUM OF ALL RESPONSES TO PHQ QUESTIONS 1-9: 0
1. LITTLE INTEREST OR PLEASURE IN DOING THINGS: 0

## 2023-07-21 VITALS
TEMPERATURE: 97.1 F | HEIGHT: 65 IN | DIASTOLIC BLOOD PRESSURE: 82 MMHG | BODY MASS INDEX: 33.67 KG/M2 | WEIGHT: 202.13 LBS | SYSTOLIC BLOOD PRESSURE: 144 MMHG | OXYGEN SATURATION: 94 % | RESPIRATION RATE: 20 BRPM | HEART RATE: 68 BPM

## 2023-08-02 ENCOUNTER — HOSPITAL ENCOUNTER (OUTPATIENT)
Facility: HOSPITAL | Age: 63
Discharge: HOME | End: 2023-08-05
Attending: INTERNAL MEDICINE
Payer: COMMERCIAL

## 2023-08-02 DIAGNOSIS — D49.0 IPMN (INTRADUCTAL PAPILLARY MUCINOUS NEOPLASM): ICD-10-CM

## 2023-08-02 PROCEDURE — 6360000004 HC RX CONTRAST MEDICATION: Performed by: INTERNAL MEDICINE

## 2023-08-02 PROCEDURE — 74183 MRI ABD W/O CNTR FLWD CNTR: CPT

## 2023-08-02 PROCEDURE — A9577 INJ MULTIHANCE: HCPCS | Performed by: INTERNAL MEDICINE

## 2023-08-02 RX ADMIN — GADOBENATE DIMEGLUMINE 20 ML: 529 INJECTION, SOLUTION INTRAVENOUS at 08:51

## 2023-08-15 RX ORDER — CEVIMELINE HYDROCHLORIDE 30 MG/1
CAPSULE ORAL
Qty: 270 CAPSULE | Refills: 3 | Status: SHIPPED | OUTPATIENT
Start: 2023-08-15

## 2023-08-24 ENCOUNTER — TELEPHONE (OUTPATIENT)
Age: 63
End: 2023-08-24

## 2023-08-24 DIAGNOSIS — K86.9 PANCREATIC LESION: Primary | ICD-10-CM

## 2023-08-24 NOTE — TELEPHONE ENCOUNTER
Pls call  MRI showed that panc cyst/lesion was 6mm last year  Now slightly larger 10mm   Would suggest opinion from Dr Chay Gay as uncertain what this means, if anything  Referral sent

## 2023-09-29 RX ORDER — PITAVASTATIN CALCIUM 1.04 MG/1
1 TABLET, FILM COATED ORAL DAILY
Qty: 90 TABLET | Refills: 3 | Status: SHIPPED | OUTPATIENT
Start: 2023-09-29

## 2024-01-05 DIAGNOSIS — G47.00 INSOMNIA, UNSPECIFIED TYPE: Primary | ICD-10-CM

## 2024-01-08 RX ORDER — PANTOPRAZOLE SODIUM 40 MG/1
TABLET, DELAYED RELEASE ORAL
Qty: 90 TABLET | Refills: 3 | Status: SHIPPED | OUTPATIENT
Start: 2024-01-08

## 2024-01-08 RX ORDER — ZOLPIDEM TARTRATE 10 MG/1
10 TABLET ORAL NIGHTLY
Qty: 30 TABLET | Refills: 1 | Status: SHIPPED | OUTPATIENT
Start: 2024-01-08 | End: 2024-03-08

## 2024-01-21 DIAGNOSIS — E05.90 HYPERTHYROIDISM: ICD-10-CM

## 2024-01-21 DIAGNOSIS — R73.01 IFG (IMPAIRED FASTING GLUCOSE): ICD-10-CM

## 2024-01-21 DIAGNOSIS — I10 ESSENTIAL HYPERTENSION: ICD-10-CM

## 2024-01-21 DIAGNOSIS — E78.5 HYPERLIPIDEMIA, UNSPECIFIED HYPERLIPIDEMIA TYPE: ICD-10-CM

## 2024-07-16 ENCOUNTER — LAB (OUTPATIENT)
Facility: CLINIC | Age: 64
End: 2024-07-16

## 2024-07-16 DIAGNOSIS — R73.01 IFG (IMPAIRED FASTING GLUCOSE): ICD-10-CM

## 2024-07-17 LAB
ALBUMIN SERPL-MCNC: 4.1 G/DL (ref 3.9–4.9)
ALP SERPL-CCNC: 92 IU/L (ref 44–121)
ALT SERPL-CCNC: 19 IU/L (ref 0–32)
AST SERPL-CCNC: 19 IU/L (ref 0–40)
BASOPHILS # BLD AUTO: 0.1 X10E3/UL (ref 0–0.2)
BASOPHILS NFR BLD AUTO: 1 %
BILIRUB SERPL-MCNC: 0.3 MG/DL (ref 0–1.2)
BUN SERPL-MCNC: 18 MG/DL (ref 8–27)
BUN/CREAT SERPL: 20 (ref 12–28)
CALCIUM SERPL-MCNC: 9 MG/DL (ref 8.7–10.3)
CHLORIDE SERPL-SCNC: 106 MMOL/L (ref 96–106)
CHOLEST SERPL-MCNC: 155 MG/DL (ref 100–199)
CO2 SERPL-SCNC: 23 MMOL/L (ref 20–29)
CREAT SERPL-MCNC: 0.9 MG/DL (ref 0.57–1)
EGFRCR SERPLBLD CKD-EPI 2021: 71 ML/MIN/1.73
EOSINOPHIL # BLD AUTO: 0.2 X10E3/UL (ref 0–0.4)
EOSINOPHIL NFR BLD AUTO: 4 %
ERYTHROCYTE [DISTWIDTH] IN BLOOD BY AUTOMATED COUNT: 12.2 % (ref 11.7–15.4)
GLOBULIN SER CALC-MCNC: 2 G/DL (ref 1.5–4.5)
GLUCOSE SERPL-MCNC: 94 MG/DL (ref 70–99)
HCT VFR BLD AUTO: 41.2 % (ref 34–46.6)
HDLC SERPL-MCNC: 57 MG/DL
HGB BLD-MCNC: 13.9 G/DL (ref 11.1–15.9)
IMM GRANULOCYTES # BLD AUTO: 0 X10E3/UL (ref 0–0.1)
IMM GRANULOCYTES NFR BLD AUTO: 0 %
LDLC SERPL CALC-MCNC: 83 MG/DL (ref 0–99)
LYMPHOCYTES # BLD AUTO: 1.2 X10E3/UL (ref 0.7–3.1)
LYMPHOCYTES NFR BLD AUTO: 23 %
MCH RBC QN AUTO: 31.4 PG (ref 26.6–33)
MCHC RBC AUTO-ENTMCNC: 33.7 G/DL (ref 31.5–35.7)
MCV RBC AUTO: 93 FL (ref 79–97)
MONOCYTES # BLD AUTO: 0.5 X10E3/UL (ref 0.1–0.9)
MONOCYTES NFR BLD AUTO: 9 %
NEUTROPHILS # BLD AUTO: 3.3 X10E3/UL (ref 1.4–7)
NEUTROPHILS NFR BLD AUTO: 63 %
PLATELET # BLD AUTO: 265 X10E3/UL (ref 150–450)
POTASSIUM SERPL-SCNC: 4.3 MMOL/L (ref 3.5–5.2)
PROT SERPL-MCNC: 6.1 G/DL (ref 6–8.5)
RBC # BLD AUTO: 4.43 X10E6/UL (ref 3.77–5.28)
SODIUM SERPL-SCNC: 144 MMOL/L (ref 134–144)
T4 FREE SERPL-MCNC: 1.29 NG/DL (ref 0.82–1.77)
TRIGL SERPL-MCNC: 80 MG/DL (ref 0–149)
TSH SERPL DL<=0.005 MIU/L-ACNC: 1.18 UIU/ML (ref 0.45–4.5)
VLDLC SERPL CALC-MCNC: 15 MG/DL (ref 5–40)
WBC # BLD AUTO: 5.3 X10E3/UL (ref 3.4–10.8)

## 2024-07-23 ENCOUNTER — OFFICE VISIT (OUTPATIENT)
Facility: CLINIC | Age: 64
End: 2024-07-23
Payer: COMMERCIAL

## 2024-07-23 VITALS
WEIGHT: 186 LBS | DIASTOLIC BLOOD PRESSURE: 67 MMHG | BODY MASS INDEX: 30.99 KG/M2 | RESPIRATION RATE: 16 BRPM | OXYGEN SATURATION: 95 % | HEART RATE: 80 BPM | TEMPERATURE: 98.7 F | HEIGHT: 65 IN | SYSTOLIC BLOOD PRESSURE: 116 MMHG

## 2024-07-23 DIAGNOSIS — M35.00 SJOGREN'S SYNDROME, WITH UNSPECIFIED ORGAN INVOLVEMENT (HCC): ICD-10-CM

## 2024-07-23 DIAGNOSIS — G89.29 CHRONIC LEFT SHOULDER PAIN: ICD-10-CM

## 2024-07-23 DIAGNOSIS — E78.5 HYPERLIPIDEMIA, UNSPECIFIED HYPERLIPIDEMIA TYPE: ICD-10-CM

## 2024-07-23 DIAGNOSIS — E66.9 OBESITY (BMI 30.0-34.9): ICD-10-CM

## 2024-07-23 DIAGNOSIS — E05.90 HYPERTHYROIDISM: ICD-10-CM

## 2024-07-23 DIAGNOSIS — M25.512 CHRONIC LEFT SHOULDER PAIN: ICD-10-CM

## 2024-07-23 DIAGNOSIS — Z00.00 PHYSICAL EXAM: Primary | ICD-10-CM

## 2024-07-23 DIAGNOSIS — I10 ESSENTIAL HYPERTENSION: ICD-10-CM

## 2024-07-23 DIAGNOSIS — K63.5 POLYP OF COLON, UNSPECIFIED PART OF COLON, UNSPECIFIED TYPE: ICD-10-CM

## 2024-07-23 PROCEDURE — 99396 PREV VISIT EST AGE 40-64: CPT | Performed by: INTERNAL MEDICINE

## 2024-07-23 PROCEDURE — 3074F SYST BP LT 130 MM HG: CPT | Performed by: INTERNAL MEDICINE

## 2024-07-23 PROCEDURE — 3078F DIAST BP <80 MM HG: CPT | Performed by: INTERNAL MEDICINE

## 2024-07-23 NOTE — PROGRESS NOTES
Bianka De La O presents today for   Chief Complaint   Patient presents with    Annual Exam       \"Have you been to the ER, urgent care clinic since your last visit?  Hospitalized since your last visit?\"    NO    “Have you seen or consulted any other health care providers outside of [facility] since your last visit?”    YES - When: approximately 12 months ago.  Where and Why: [place]: Eye doctor, cataract surgery.             
(CARDIA)     Difficulty of Paying Living Expenses: Not hard at all   Food Insecurity: No Food Insecurity (7/23/2024)    Hunger Vital Sign     Worried About Running Out of Food in the Last Year: Never true     Ran Out of Food in the Last Year: Never true   Transportation Needs: Unknown (7/23/2024)    PRAPARE - Transportation     Lack of Transportation (Medical): Not on file     Lack of Transportation (Non-Medical): No   Physical Activity: Not on file   Stress: Not on file   Social Connections: Not on file   Intimate Partner Violence: Not on file   Housing Stability: Unknown (7/23/2024)    Housing Stability Vital Sign     Unable to Pay for Housing in the Last Year: Not on file     Number of Places Lived in the Last Year: Not on file     Unstable Housing in the Last Year: No     Family History   Problem Relation Age of Onset    Ovarian Cancer Maternal Grandmother     Cancer Paternal Aunt     Cancer Paternal Grandmother         ovarian    Heart Disease Father     Hypertension Mother      Immunization History   Administered Date(s) Administered    COVID-19, MODERNA BLUE border, Primary or Immunocompromised, (age 12y+), IM, 100 mcg/0.5mL 07/01/2021, 08/01/2021    Influenza Virus Vaccine 10/10/2020    Influenza, FLUARIX, FLULAVAL, FLUZONE (age 6 mo+) AND AFLURIA, (age 3 y+), PF, 0.5mL 12/12/2016, 03/13/2018, 11/29/2022    Influenza, FLUCELVAX, (age 6 mo+), MDCK, MDV, 0.5mL 10/05/2021    Influenza, Trivalent PF 12/20/2013    Td vaccine (adult) 02/01/2007    Zoster Recombinant (Shingrix) 01/01/2015, 03/01/2015     Current Outpatient Medications   Medication Sig    pantoprazole (PROTONIX) 40 MG tablet take 1 tablet by mouth once daily    amLODIPine (NORVASC) 10 MG tablet take 1 tablet by mouth once daily    LIVALO 1 MG TABS tablet take 1 tablet by mouth once daily    gabapentin (NEURONTIN) 300 MG capsule Take 1 capsule by mouth 2 times daily for 90 days. Intended supply: 30 days Max Daily Amount: 600 mg (Patient taking

## 2024-08-12 RX ORDER — CEVIMELINE HYDROCHLORIDE 30 MG/1
CAPSULE ORAL
Qty: 270 CAPSULE | Refills: 3 | Status: SHIPPED | OUTPATIENT
Start: 2024-08-12

## 2024-08-21 DIAGNOSIS — G89.29 CHRONIC BILATERAL LOW BACK PAIN WITHOUT SCIATICA: ICD-10-CM

## 2024-08-21 DIAGNOSIS — G47.00 INSOMNIA, UNSPECIFIED TYPE: ICD-10-CM

## 2024-08-21 DIAGNOSIS — M54.50 CHRONIC BILATERAL LOW BACK PAIN WITHOUT SCIATICA: ICD-10-CM

## 2024-08-21 RX ORDER — ZOLPIDEM TARTRATE 10 MG/1
10 TABLET ORAL NIGHTLY
Qty: 30 TABLET | Refills: 1 | Status: SHIPPED | OUTPATIENT
Start: 2024-08-21 | End: 2024-10-20

## 2024-08-21 RX ORDER — GABAPENTIN 300 MG/1
300 CAPSULE ORAL 2 TIMES DAILY
Qty: 60 CAPSULE | Refills: 2 | Status: SHIPPED | OUTPATIENT
Start: 2024-08-21 | End: 2024-11-19

## 2024-10-24 RX ORDER — PITAVASTATIN CALCIUM 1.04 MG/1
1 TABLET, FILM COATED ORAL DAILY
Qty: 90 TABLET | Refills: 3 | Status: SHIPPED | OUTPATIENT
Start: 2024-10-24

## 2024-12-19 RX ORDER — AMLODIPINE BESYLATE 10 MG/1
TABLET ORAL
Qty: 90 TABLET | Refills: 3 | Status: SHIPPED | OUTPATIENT
Start: 2024-12-19

## 2025-01-02 RX ORDER — PANTOPRAZOLE SODIUM 40 MG/1
TABLET, DELAYED RELEASE ORAL
Qty: 90 TABLET | Refills: 3 | Status: SHIPPED | OUTPATIENT
Start: 2025-01-02

## 2025-01-06 DIAGNOSIS — G47.00 INSOMNIA, UNSPECIFIED TYPE: ICD-10-CM

## 2025-01-10 RX ORDER — ZOLPIDEM TARTRATE 10 MG/1
10 TABLET ORAL NIGHTLY PRN
Qty: 30 TABLET | Refills: 1 | Status: SHIPPED | OUTPATIENT
Start: 2025-01-10 | End: 2025-03-11

## 2025-01-23 DIAGNOSIS — Z00.00 PHYSICAL EXAM: ICD-10-CM

## 2025-03-31 DIAGNOSIS — M54.50 CHRONIC BILATERAL LOW BACK PAIN WITHOUT SCIATICA: ICD-10-CM

## 2025-03-31 DIAGNOSIS — G89.29 CHRONIC BILATERAL LOW BACK PAIN WITHOUT SCIATICA: ICD-10-CM

## 2025-04-01 RX ORDER — GABAPENTIN 300 MG/1
CAPSULE ORAL
Qty: 60 CAPSULE | Refills: 5 | Status: SHIPPED | OUTPATIENT
Start: 2025-04-01 | End: 2025-09-28

## 2025-07-16 NOTE — PROGRESS NOTES
64 y.o. female who presents for evaluation     She retired last year and bought a house and renovated it from July to Dec    She's keeping up the golfing, dancing, gardening, and now doing 1hr 3-4x/week walking. No cp, sob, pnd, edema    Her main complaint is significant fatigue.  Denied depression, sleeping long enough, no thyroid complaints and sees Dr Brown and off tapazole currently and labs ok from 10/24 draw.  She does snore but no witnessed apnea.    Her spine issues are stable, she uses the gabapentin every 1-2 weeks    Denies polyuria, polydipsia, nocturia, vision change.  Not checking sugars at this time. Her diet has not been as strict due to gi issue below and resulting wt gain as below     7/19/2022 11/29/2022 7/20/2023 7/23/2024 7/24/2025   Vitals        Weight - Scale 192 lb  200 lb  202 lb 2 oz  186 lb  195 lb      The Sjogren's is being tx'ed w evoxac, not seeing Dr Pham at this time    No  complaints. She's been having recurring nausea but no vomiting, change in bh. Has intermittent heartburn on protonix. She was being followed by [facility] for IPMN but wants to change providers    She ran out of livalo due to change in pharmacy    LAST MEDICARE WELLNESS EXAM:  7/24/25    Past Medical History:   Diagnosis Date    Adrenal adenoma, left 02/2022    on CT; >1cm on mri (4/22)    Agatston CAC score, <100 07/2019    ca score 51; mild plaque LAD and RCA    Allergic rhinitis     Colon adenoma 08/2016    Dr Noonan    Colon polyps     Dr Noonan 2007, 2011    COVID-19 vaccine dose declined 07/2023    boosters    COVID-19 virus infection 01/2021    Encounter for screening for vascular disease 08/2023    exec PE [facility] (8/23) HUMZA<50%, RABI 1.3/LABI 1.21    FHx: heart disease     Gastritis 07/2017    h pylori+ Dr Noonan    GERD (gastroesophageal reflux disease) 2007    [facility]    H/O cardiovascular stress test     exec PE [facility] stress echo low risk, tr MR/AI (8/23)    Hyperlipidemia     calculated risk score

## 2025-07-22 LAB
ALBUMIN SERPL-MCNC: 4.3 G/DL (ref 3.9–4.9)
ALP SERPL-CCNC: 81 IU/L (ref 44–121)
ALT SERPL-CCNC: 18 IU/L (ref 0–32)
AST SERPL-CCNC: 19 IU/L (ref 0–40)
BASOPHILS # BLD AUTO: 0.1 X10E3/UL (ref 0–0.2)
BASOPHILS NFR BLD AUTO: 1 %
BILIRUB SERPL-MCNC: 0.5 MG/DL (ref 0–1.2)
BUN SERPL-MCNC: 13 MG/DL (ref 8–27)
BUN/CREAT SERPL: 14 (ref 12–28)
CALCIUM SERPL-MCNC: 9.9 MG/DL (ref 8.7–10.3)
CHLORIDE SERPL-SCNC: 103 MMOL/L (ref 96–106)
CHOLEST SERPL-MCNC: 214 MG/DL (ref 100–199)
CO2 SERPL-SCNC: 24 MMOL/L (ref 20–29)
CREAT SERPL-MCNC: 0.93 MG/DL (ref 0.57–1)
EGFRCR SERPLBLD CKD-EPI 2021: 68 ML/MIN/1.73
EOSINOPHIL # BLD AUTO: 0.2 X10E3/UL (ref 0–0.4)
EOSINOPHIL NFR BLD AUTO: 4 %
ERYTHROCYTE [DISTWIDTH] IN BLOOD BY AUTOMATED COUNT: 12.9 % (ref 11.7–15.4)
GLOBULIN SER CALC-MCNC: 2.1 G/DL (ref 1.5–4.5)
GLUCOSE SERPL-MCNC: 100 MG/DL (ref 70–99)
HCT VFR BLD AUTO: 46.7 % (ref 34–46.6)
HDLC SERPL-MCNC: 57 MG/DL
HGB BLD-MCNC: 15.5 G/DL (ref 11.1–15.9)
IMM GRANULOCYTES # BLD AUTO: 0 X10E3/UL (ref 0–0.1)
IMM GRANULOCYTES NFR BLD AUTO: 0 %
LDLC SERPL CALC-MCNC: 139 MG/DL (ref 0–99)
LYMPHOCYTES # BLD AUTO: 1.4 X10E3/UL (ref 0.7–3.1)
LYMPHOCYTES NFR BLD AUTO: 25 %
MCH RBC QN AUTO: 31.3 PG (ref 26.6–33)
MCHC RBC AUTO-ENTMCNC: 33.2 G/DL (ref 31.5–35.7)
MCV RBC AUTO: 94 FL (ref 79–97)
MONOCYTES # BLD AUTO: 0.4 X10E3/UL (ref 0.1–0.9)
MONOCYTES NFR BLD AUTO: 7 %
NEUTROPHILS # BLD AUTO: 3.7 X10E3/UL (ref 1.4–7)
NEUTROPHILS NFR BLD AUTO: 63 %
PLATELET # BLD AUTO: 309 X10E3/UL (ref 150–450)
POTASSIUM SERPL-SCNC: 4.9 MMOL/L (ref 3.5–5.2)
PROT SERPL-MCNC: 6.4 G/DL (ref 6–8.5)
RBC # BLD AUTO: 4.96 X10E6/UL (ref 3.77–5.28)
SODIUM SERPL-SCNC: 140 MMOL/L (ref 134–144)
TRIGL SERPL-MCNC: 101 MG/DL (ref 0–149)
VLDLC SERPL CALC-MCNC: 18 MG/DL (ref 5–40)
WBC # BLD AUTO: 5.8 X10E3/UL (ref 3.4–10.8)

## 2025-07-24 ENCOUNTER — OFFICE VISIT (OUTPATIENT)
Facility: CLINIC | Age: 65
End: 2025-07-24
Payer: MEDICARE

## 2025-07-24 VITALS
BODY MASS INDEX: 32.49 KG/M2 | HEIGHT: 65 IN | TEMPERATURE: 97.9 F | DIASTOLIC BLOOD PRESSURE: 84 MMHG | WEIGHT: 195 LBS | HEART RATE: 77 BPM | OXYGEN SATURATION: 96 % | RESPIRATION RATE: 16 BRPM | SYSTOLIC BLOOD PRESSURE: 129 MMHG

## 2025-07-24 DIAGNOSIS — R06.83 SNORING: ICD-10-CM

## 2025-07-24 DIAGNOSIS — E05.90 HYPERTHYROIDISM: ICD-10-CM

## 2025-07-24 DIAGNOSIS — Z71.89 ADVANCED CARE PLANNING/COUNSELING DISCUSSION: ICD-10-CM

## 2025-07-24 DIAGNOSIS — K21.9 GASTROESOPHAGEAL REFLUX DISEASE WITHOUT ESOPHAGITIS: ICD-10-CM

## 2025-07-24 DIAGNOSIS — M35.00 SJOGREN'S SYNDROME, WITH UNSPECIFIED ORGAN INVOLVEMENT: ICD-10-CM

## 2025-07-24 DIAGNOSIS — E78.5 HYPERLIPIDEMIA, UNSPECIFIED HYPERLIPIDEMIA TYPE: ICD-10-CM

## 2025-07-24 DIAGNOSIS — N95.9 MENOPAUSAL AND PERIMENOPAUSAL DISORDER: ICD-10-CM

## 2025-07-24 DIAGNOSIS — E66.811 OBESITY (BMI 30.0-34.9): ICD-10-CM

## 2025-07-24 DIAGNOSIS — D49.0 IPMN (INTRADUCTAL PAPILLARY MUCINOUS NEOPLASM): ICD-10-CM

## 2025-07-24 DIAGNOSIS — I10 ESSENTIAL HYPERTENSION: ICD-10-CM

## 2025-07-24 DIAGNOSIS — R53.83 OTHER FATIGUE: ICD-10-CM

## 2025-07-24 DIAGNOSIS — K63.5 POLYP OF COLON, UNSPECIFIED PART OF COLON, UNSPECIFIED TYPE: ICD-10-CM

## 2025-07-24 DIAGNOSIS — R11.0 NAUSEA: ICD-10-CM

## 2025-07-24 DIAGNOSIS — Z12.31 SCREENING MAMMOGRAM FOR BREAST CANCER: ICD-10-CM

## 2025-07-24 DIAGNOSIS — Z00.00 WELCOME TO MEDICARE PREVENTIVE VISIT: Primary | ICD-10-CM

## 2025-07-24 DIAGNOSIS — R73.01 IFG (IMPAIRED FASTING GLUCOSE): ICD-10-CM

## 2025-07-24 PROCEDURE — 3079F DIAST BP 80-89 MM HG: CPT | Performed by: INTERNAL MEDICINE

## 2025-07-24 PROCEDURE — G8419 CALC BMI OUT NRM PARAM NOF/U: HCPCS | Performed by: INTERNAL MEDICINE

## 2025-07-24 PROCEDURE — 1090F PRES/ABSN URINE INCON ASSESS: CPT | Performed by: INTERNAL MEDICINE

## 2025-07-24 PROCEDURE — 3074F SYST BP LT 130 MM HG: CPT | Performed by: INTERNAL MEDICINE

## 2025-07-24 PROCEDURE — G8399 PT W/DXA RESULTS DOCUMENT: HCPCS | Performed by: INTERNAL MEDICINE

## 2025-07-24 PROCEDURE — 99215 OFFICE O/P EST HI 40 MIN: CPT | Performed by: INTERNAL MEDICINE

## 2025-07-24 PROCEDURE — 99497 ADVNCD CARE PLAN 30 MIN: CPT | Performed by: INTERNAL MEDICINE

## 2025-07-24 PROCEDURE — G8427 DOCREV CUR MEDS BY ELIG CLIN: HCPCS | Performed by: INTERNAL MEDICINE

## 2025-07-24 PROCEDURE — 1036F TOBACCO NON-USER: CPT | Performed by: INTERNAL MEDICINE

## 2025-07-24 PROCEDURE — 3017F COLORECTAL CA SCREEN DOC REV: CPT | Performed by: INTERNAL MEDICINE

## 2025-07-24 PROCEDURE — 1123F ACP DISCUSS/DSCN MKR DOCD: CPT | Performed by: INTERNAL MEDICINE

## 2025-07-24 PROCEDURE — G0402 INITIAL PREVENTIVE EXAM: HCPCS | Performed by: INTERNAL MEDICINE

## 2025-07-24 SDOH — ECONOMIC STABILITY: FOOD INSECURITY: WITHIN THE PAST 12 MONTHS, THE FOOD YOU BOUGHT JUST DIDN'T LAST AND YOU DIDN'T HAVE MONEY TO GET MORE.: NEVER TRUE

## 2025-07-24 SDOH — ECONOMIC STABILITY: FOOD INSECURITY: WITHIN THE PAST 12 MONTHS, YOU WORRIED THAT YOUR FOOD WOULD RUN OUT BEFORE YOU GOT MONEY TO BUY MORE.: NEVER TRUE

## 2025-07-24 ASSESSMENT — PATIENT HEALTH QUESTIONNAIRE - PHQ9
SUM OF ALL RESPONSES TO PHQ QUESTIONS 1-9: 0
SUM OF ALL RESPONSES TO PHQ QUESTIONS 1-9: 0
1. LITTLE INTEREST OR PLEASURE IN DOING THINGS: NOT AT ALL
SUM OF ALL RESPONSES TO PHQ QUESTIONS 1-9: 0
SUM OF ALL RESPONSES TO PHQ QUESTIONS 1-9: 0
2. FEELING DOWN, DEPRESSED OR HOPELESS: NOT AT ALL

## 2025-07-24 ASSESSMENT — VISUAL ACUITY
OD_CC: 20/30
OS_CC: 20/40

## 2025-07-24 ASSESSMENT — LIFESTYLE VARIABLES
HOW OFTEN DO YOU HAVE A DRINK CONTAINING ALCOHOL: NEVER
HOW MANY STANDARD DRINKS CONTAINING ALCOHOL DO YOU HAVE ON A TYPICAL DAY: PATIENT DOES NOT DRINK

## 2025-07-24 NOTE — PROGRESS NOTES
Bianka De La O presents today for   Chief Complaint   Patient presents with    Medicare AWV     Welcome to Medicare       \"Have you been to the ER, urgent care clinic since your last visit?  Hospitalized since your last visit?\"    YES - When: approximately 2 months ago.  Where and Why: [facility]: sinus problelm.    “Have you seen or consulted any other health care providers outside of [facility] since your last visit?”    YES - When: approximately 2 months ago.  Where and Why: Orthopedics, shoulder injection.       Have you had a mammogram?”   NO    Date of last Mammogram: 8/12/2022

## 2025-07-25 NOTE — PATIENT INSTRUCTIONS
includes daily menus and recipes may be best.  Ask your doctor about other health professionals who can help you achieve your weight-loss goals.  A dietitian can help you make healthy changes in your diet.  An exercise specialist or  can help you develop a safe and effective exercise program.  A counselor or psychiatrist can help you cope with issues such as depression, anxiety, or family problems that can make it hard to focus on weight loss.  Consider joining a support group for people who are trying to lose weight. Your doctor can suggest groups in your area.  Where can you learn more?  Go to https://www.[employer]net/patientEd and enter U357 to learn more about \"Starting a Weight-Loss Plan: Care Instructions.\"  Current as of: April 30, 2024  Content Version: 14.5  © 4944-6070 [employer].   Care instructions adapted under license by [employer]. If you have questions about a medical condition or this instruction, always ask your healthcare professional. [employer], disclaims any warranty or liability for your use of this information.         A Healthy Heart: Care Instructions  Overview     Coronary artery disease, also called heart disease, occurs when a substance called plaque builds up in the vessels that supply oxygen-rich blood to your heart muscle. This can narrow the blood vessels and reduce blood flow. A heart attack happens when blood flow is completely blocked. A high-fat diet, smoking, and other factors increase the risk of heart disease.  Your doctor has found that you have a chance of having heart disease. A heart-healthy lifestyle can help keep your heart healthy and prevent heart disease. This lifestyle includes eating healthy, being active, staying at a weight that's healthy for you, and not smoking or using tobacco. It also includes taking medicines as directed, managing other health conditions, and trying to get a healthy amount of sleep.  Follow-up care

## 2025-07-25 NOTE — PROGRESS NOTES
Medicare Annual Wellness Visit    Bianka De La O is here for Medicare AWV (Welcome to Medicare)    Assessment & Plan   Screening mammogram for breast cancer  -     ROGER Screening Bilateral; Future  Menopausal and perimenopausal disorder  -     DEXA BONE DENSITY AXIAL SKELETON; Future  Nausea  -     esomeprazole (NEXIUM) 20 MG delayed release capsule; Take 1 capsule by mouth every morning (before breakfast), Disp-90 capsule, R-1Normal  -     External Referral To Gastroenterology  -     Lipase; Future  IPMN (intraductal papillary mucinous neoplasm)  -     External Referral To Gastroenterology  Gastroesophageal reflux disease without esophagitis  -     esomeprazole (NEXIUM) 20 MG delayed release capsule; Take 1 capsule by mouth every morning (before breakfast), Disp-90 capsule, R-1Normal  -     External Referral To Gastroenterology  Essential hypertension  Polyp of colon, unspecified part of colon, unspecified type  IFG (impaired fasting glucose)  -     Hemoglobin A1C; Future  Hyperthyroidism  Sjogren's syndrome, with unspecified organ involvement  Hyperlipidemia, unspecified hyperlipidemia type  Obesity (BMI 30.0-34.9)  Other fatigue  -     TSH reflex to FT4; Future  -     Vitamin B12; Future  -     Methylmalonic Acid, Serum; Future  -     External Referral To Neurology  Snoring  -     External Referral To Neurology  [place] to Medicare preventive visit       Return in 3 months (on 10/24/2025).     Subjective       Patient's complete Health Risk Assessment and screening values have been reviewed and are found in Flowsheets. The following problems were reviewed today and where indicated follow up appointments were made and/or referrals ordered.    Positive Risk Factor Screenings with Interventions:                Abnormal BMI (obese):  Body mass index is 32.45 kg/m². (!) Abnormal  Interventions:  Patient declines any further evaluation or treatment         Hearing Screen:  Do you or your family notice any

## 2025-07-25 NOTE — ACP (ADVANCE CARE PLANNING)
Advance Care Planning     Advance Care Planning (ACP) Physician/NP/PA Conversation    Date of Conversation: 7/24/2025  Conducted with: Patient with Decision Making Capacity    Healthcare Decision Maker:      Primary Decision Maker: Babita Wright - [phone]    Click here to complete Healthcare Decision Makers including selection of the Healthcare Decision Maker Relationship (ie \"Primary\")  Today we documented Decision Maker(s) consistent with Legal Next of Kin hierarchy.    Care Preferences:    Hospitalization:  \"If your health worsens and it becomes clear that your chance of recovery is unlikely, what would be your preference regarding hospitalization?\"  The patient would prefer hospitalization.    Ventilation:  \"If you were unable to breath on your own and your chance of recovery was unlikely, what would be your preference about the use of a ventilator (breathing machine) if it was available to you?\"  The patient would desire the use of a ventilator.    Resuscitation:  \"In the event your heart stopped as a result of an underlying serious health condition, would you want attempts made to restart your heart, or would you prefer a natural death?\"  Yes, attempt to resuscitate.    ventilation preferences, hospitalization preferences, and resuscitation preferences    Conversation Outcomes / Follow-Up Plan:  ACP in process - information provided, considering goals and options  Reviewed DNR/DNI and patient elects Full Code (Attempt Resuscitation)    Length of Voluntary ACP Conversation in minutes:  16 minutes    EDITH GILMORE MD

## 2025-07-28 ENCOUNTER — HOSPITAL ENCOUNTER (OUTPATIENT)
Facility: HOSPITAL | Age: 65
Setting detail: SPECIMEN
Discharge: HOME | End: 2025-07-31
Payer: COMMERCIAL

## 2025-07-28 DIAGNOSIS — R11.0 NAUSEA: ICD-10-CM

## 2025-07-28 DIAGNOSIS — R73.01 IFG (IMPAIRED FASTING GLUCOSE): ICD-10-CM

## 2025-07-28 DIAGNOSIS — R53.83 OTHER FATIGUE: ICD-10-CM

## 2025-07-28 LAB
EST. AVERAGE GLUCOSE BLD GHB EST-MCNC: 104 MG/DL
HBA1C MFR BLD: 5.3 % (ref 4.2–5.6)
LIPASE SERPL-CCNC: 32 U/L (ref 13–75)
TSH W FREE THYROID IF ABNORMAL: 1.53 UIU/ML (ref 0.27–4.2)
VIT B12 SERPL-MCNC: 968 PG/ML (ref 211–911)

## 2025-07-28 PROCEDURE — 83690 ASSAY OF LIPASE: CPT

## 2025-07-28 PROCEDURE — 84443 ASSAY THYROID STIM HORMONE: CPT

## 2025-07-28 PROCEDURE — 83036 HEMOGLOBIN GLYCOSYLATED A1C: CPT

## 2025-07-28 PROCEDURE — 83921 ORGANIC ACID SINGLE QUANT: CPT

## 2025-07-28 PROCEDURE — 36415 COLL VENOUS BLD VENIPUNCTURE: CPT

## 2025-07-28 PROCEDURE — 82607 VITAMIN B-12: CPT

## 2025-07-30 LAB — METHYLMALONATE SERPL-SCNC: 95 NMOL/L (ref 0–378)

## 2025-08-05 DIAGNOSIS — E78.5 HYPERLIPIDEMIA, UNSPECIFIED HYPERLIPIDEMIA TYPE: Primary | ICD-10-CM

## 2025-08-05 DIAGNOSIS — I10 ESSENTIAL HYPERTENSION: Primary | ICD-10-CM

## 2025-08-06 RX ORDER — PITAVASTATIN CALCIUM 1.04 MG/1
1 TABLET, FILM COATED ORAL DAILY
Qty: 90 TABLET | Refills: 0 | Status: SHIPPED | OUTPATIENT
Start: 2025-08-06

## 2025-08-06 RX ORDER — CEVIMELINE HYDROCHLORIDE 30 MG/1
30 CAPSULE ORAL 3 TIMES DAILY
Qty: 270 CAPSULE | Refills: 0 | Status: SHIPPED | OUTPATIENT
Start: 2025-08-06

## 2025-08-06 RX ORDER — AMLODIPINE BESYLATE 10 MG/1
10 TABLET ORAL DAILY
Qty: 90 TABLET | Refills: 0 | Status: SHIPPED | OUTPATIENT
Start: 2025-08-06

## (undated) DEVICE — BLADE ELECTRODE: Brand: EDGE

## (undated) DEVICE — SKIN CLOS DERMABND PRINEO 60CM -- DERMABOUND PRINEO

## (undated) DEVICE — INTENDED FOR TISSUE SEPARATION, AND OTHER PROCEDURES THAT REQUIRE A SHARP SURGICAL BLADE TO PUNCTURE OR CUT.: Brand: BARD-PARKER ® CARBON RIB-BACK BLADES

## (undated) DEVICE — STERILE LATEX POWDER-FREE SURGICAL GLOVESWITH NITRILE COATING: Brand: PROTEXIS

## (undated) DEVICE — GLOVE ORANGE PI 8   MSG9080

## (undated) DEVICE — GLOVE SURG SZ 65 THK91MIL LTX FREE SYN POLYISOPRENE

## (undated) DEVICE — SOLUTION IRRIG 3000ML LAC R FLX CONT

## (undated) DEVICE — 3M™ COBAN™ SELF-ADHERENT WRAP, 1586S, STERILE, 6 IN X 5 YD (15 CM X 4,5 M), 12 ROLLS/CASE: Brand: 3M™ COBAN™

## (undated) DEVICE — BNDG,ELSTC,MATRIX,STRL,6"X5YD,LF,HOOK&LP: Brand: MEDLINE

## (undated) DEVICE — SUTURE VCRL + ANTIBACT NO 1 CT 36IN ABSRB BRAID UD VCP959H

## (undated) DEVICE — SUTURE STRATAFIX SZ 1 L14IN ABSRB VLT L36MM MO-4 TAPERPOINT SXPD2B400

## (undated) DEVICE — SHEET,DRAPE,70X100,STERILE: Brand: MEDLINE

## (undated) DEVICE — SYR LR LCK 1ML GRAD NSAF 30ML --

## (undated) DEVICE — PLUS HANDPIECE WITH SPRAY TIP: Brand: SURGILAV

## (undated) DEVICE — STRYKER PERFORMANCE SERIES SAGITTAL BLADE: Brand: STRYKER PERFORMANCE SERIES

## (undated) DEVICE — DRESSING ANTIMIC FOAM OPTIFOAM POSTOP ADH 4X14 IN

## (undated) DEVICE — SUT VCRL + 2-0 27IN CT2 UD -- 36/BX

## (undated) DEVICE — 3M™ TEGADERM™ HP TRANSPARENT FILM DRESSING FRAME STYLE, 9546HP, 4 IN X 4-1/2 IN (10 CM X 11.5 CM), 50/CT 4CT/CASE: Brand: 3M™ TEGADERM™

## (undated) DEVICE — ZIMMER® STERILE DISPOSABLE TOURNIQUET CUFF WITH PROTECTIVE SLEEVE AND PLC, SINGLE PORT, SINGLE BLADDER, 34 IN. (86 CM)

## (undated) DEVICE — SHEET,DRAPE,40X58,STERILE: Brand: MEDLINE

## (undated) DEVICE — REM POLYHESIVE ADULT PATIENT RETURN ELECTRODE: Brand: VALLEYLAB

## (undated) DEVICE — SUTURE MCRYL SZ 2-0 L36IN ABSRB UD L36MM CT-1 1/2 CIR Y945H

## (undated) DEVICE — DEVON™ KNEE AND BODY STRAP 60" X 3" (1.5 M X 7.6 CM): Brand: DEVON

## (undated) DEVICE — (D)PREP SKN CHLRAPRP APPL 26ML -- CONVERT TO ITEM 371833

## (undated) DEVICE — SUT MONOCRYL PLUS UD 3-0 --

## (undated) DEVICE — ATTUNE SOLO PINNING SYSTEM

## (undated) DEVICE — COVER,TABLE,HEAVY DUTY,77"X90",STRL: Brand: MEDLINE

## (undated) DEVICE — (D)HANDPIECE IRR W/HI FLO TIP -- DUPLICATE USE ITEM 121586

## (undated) DEVICE — 450 ML BOTTLE OF 0.05% CHLORHEXIDINE GLUCONATE IN 99.95% STERILE WATER FOR IRRIGATION, USP AND APPLICATOR.: Brand: IRRISEPT ANTIMICROBIAL WOUND LAVAGE

## (undated) DEVICE — OSCILLATING TIP SAW CARTRIDGE: Brand: PRECISION FALCON

## (undated) DEVICE — HYPODERMIC SAFETY NEEDLE: Brand: MAGELLAN

## (undated) DEVICE — GOWN,AURORA,FABRIC-REINFORCED,X-LARGE: Brand: MEDLINE

## (undated) DEVICE — NEEDLE HYPO 21GA L1.5IN INTRAMUSCULAR S STL LATCH BVL UP

## (undated) DEVICE — INTENDED FOR TISSUE SEPARATION, AND OTHER PROCEDURES THAT REQUIRE A SHARP SURGICAL BLADE TO PUNCTURE OR CUT.: Brand: BARD-PARKER SAFETY BLADES SIZE 10, STERILE

## (undated) DEVICE — DRAPE,TOP,102X53,STERILE: Brand: MEDLINE

## (undated) DEVICE — TOTAL KNEE PACK: Brand: MEDLINE INDUSTRIES, INC.

## (undated) DEVICE — GARMENT COMPR M FOR 13IN FT INTMIT SGL BLDR HEM FORC II

## (undated) DEVICE — SOL IRR STRL H2O 1500ML BTL --

## (undated) DEVICE — HOOD, PEEL-AWAY: Brand: FLYTE

## (undated) DEVICE — 4-PORT MANIFOLD: Brand: NEPTUNE 2

## (undated) DEVICE — DRSG FOAM MEPILX PST OP 4X12IN --

## (undated) DEVICE — BLADE ELECTRODE: Brand: VALLEYLAB

## (undated) DEVICE — STERILE POLYISOPRENE POWDER-FREE SURGICAL GLOVES: Brand: PROTEXIS

## (undated) DEVICE — 3M™ STERI-DRAPE™ U-DRAPE 1015: Brand: STERI-DRAPE™

## (undated) DEVICE — WRAP KNEE UNIV E STRP HK AND LOOP W/ GEL PK MEDCOOL

## (undated) DEVICE — SPONGE LAP SOFT 18X18 IN X RAY DETECTABLE

## (undated) DEVICE — SOL IRR NACL 0.9% 500ML POUR --

## (undated) DEVICE — ZIMMER® STERILE DISPOSABLE TOURNIQUET CUFF WITH PLC, DUAL PORT, SINGLE BLADDER, 34 IN. (86 CM)

## (undated) DEVICE — SOL IRRIGATION INJ NACL 0.9% 500ML BTL

## (undated) DEVICE — RECIPROCATING BLADE HEAVY DUTY LONG, OFFSET  (77.6 X 0.77 X 11.2MM)

## (undated) DEVICE — NDL SPNE QNCKE 18GX3.5IN LF --

## (undated) DEVICE — SHEET,DRAPE,70X85,STERILE: Brand: MEDLINE

## (undated) DEVICE — SUTURE ABSORBABLE BRAIDED 0 CT 36 IN DA UD VICRYL VCP958H

## (undated) DEVICE — PACK PROCEDURE SURG TOT KNEE CUST

## (undated) DEVICE — LEGGINGS, PAIR, 31X48, STERILE: Brand: MEDLINE

## (undated) DEVICE — SUTURE VCRL SZ 3-0 L27IN ABSRB UD L24MM PS-1 3/8 CIR PRIM J936H

## (undated) DEVICE — GLOVE ORANGE PI 8 1/2   MSG9085

## (undated) DEVICE — KENDALL SCD EXPRESS FOOT CUFF, MEDIUM: Brand: KENDALL SCD

## (undated) DEVICE — GLOVE SURG SZ 6 L12IN FNGR THK79MIL GRN LTX FREE

## (undated) DEVICE — GLOVE SURG SZ 55 THK91MIL ORANGE  LTX FREE SYN POLYISOPRENE

## (undated) DEVICE — GLOVE SURG 7 BIOGEL PI ULTRATOUCH G

## (undated) DEVICE — BANDAGE COBAN 4 IN COMPR W4INXL5YD FOAM COHESIVE QUIK STK SELF ADH SFT

## (undated) DEVICE — ADHESIVE TISS CLOSURE 22X4 CM 4 CC HI VISC EXOFIN

## (undated) DEVICE — 3M™ IOBAN™ 2 ANTIMICROBIAL INCISE DRAPE 6650EZ: Brand: IOBAN™ 2

## (undated) DEVICE — UNDERGLOVE SURG SZ 7.5 BLU LTX FREE SYN POLYISOPRENE

## (undated) DEVICE — DRAPE TWL SURG 16X26IN BLU ORB04] ALLCARE INC]

## (undated) DEVICE — BOWL BNE CEM MIX SPAT CURET SMARTMIX CTS

## (undated) DEVICE — SUT VCRL + 1 36IN CT1 VIO --